# Patient Record
Sex: FEMALE | Race: WHITE
[De-identification: names, ages, dates, MRNs, and addresses within clinical notes are randomized per-mention and may not be internally consistent; named-entity substitution may affect disease eponyms.]

---

## 2017-01-18 ENCOUNTER — APPOINTMENT (OUTPATIENT)
Dept: ORTHOPEDIC SURGERY | Facility: CLINIC | Age: 67
End: 2017-01-18

## 2017-02-23 ENCOUNTER — APPOINTMENT (OUTPATIENT)
Dept: ORTHOPEDIC SURGERY | Facility: CLINIC | Age: 67
End: 2017-02-23

## 2017-02-23 DIAGNOSIS — S93.402A SPRAIN OF UNSPECIFIED LIGAMENT OF LEFT ANKLE, INITIAL ENCOUNTER: ICD-10-CM

## 2017-02-23 DIAGNOSIS — Z00.00 ENCOUNTER FOR GENERAL ADULT MEDICAL EXAMINATION W/OUT ABNORMAL FINDINGS: ICD-10-CM

## 2017-02-28 ENCOUNTER — FORM ENCOUNTER (OUTPATIENT)
Age: 67
End: 2017-02-28

## 2017-03-01 ENCOUNTER — OUTPATIENT (OUTPATIENT)
Dept: OUTPATIENT SERVICES | Facility: HOSPITAL | Age: 67
LOS: 1 days | End: 2017-03-01
Payer: MEDICARE

## 2017-03-01 ENCOUNTER — APPOINTMENT (OUTPATIENT)
Dept: MRI IMAGING | Facility: CLINIC | Age: 67
End: 2017-03-01

## 2017-03-01 DIAGNOSIS — Z00.8 ENCOUNTER FOR OTHER GENERAL EXAMINATION: ICD-10-CM

## 2017-03-01 PROCEDURE — 73721 MRI JNT OF LWR EXTRE W/O DYE: CPT

## 2017-03-22 ENCOUNTER — APPOINTMENT (OUTPATIENT)
Dept: ORTHOPEDIC SURGERY | Facility: CLINIC | Age: 67
End: 2017-03-22

## 2017-03-22 DIAGNOSIS — S93.492A SPRAIN OF OTHER LIGAMENT OF LEFT ANKLE, INITIAL ENCOUNTER: ICD-10-CM

## 2017-05-03 ENCOUNTER — APPOINTMENT (OUTPATIENT)
Dept: ORTHOPEDIC SURGERY | Facility: CLINIC | Age: 67
End: 2017-05-03

## 2017-05-03 DIAGNOSIS — M17.12 UNILATERAL PRIMARY OSTEOARTHRITIS, LEFT KNEE: ICD-10-CM

## 2022-02-10 ENCOUNTER — INPATIENT (INPATIENT)
Facility: HOSPITAL | Age: 72
LOS: 2 days | Discharge: HOME | DRG: 35 | End: 2022-02-13
Attending: HOSPITALIST
Payer: MEDICARE

## 2022-02-10 ENCOUNTER — TRANSCRIPTION ENCOUNTER (OUTPATIENT)
Age: 72
End: 2022-02-10

## 2022-02-10 VITALS
HEIGHT: 63 IN | TEMPERATURE: 98 F | WEIGHT: 151.9 LBS | RESPIRATION RATE: 20 BRPM | DIASTOLIC BLOOD PRESSURE: 75 MMHG | SYSTOLIC BLOOD PRESSURE: 161 MMHG | HEART RATE: 90 BPM | OXYGEN SATURATION: 99 %

## 2022-02-10 DIAGNOSIS — I65.29 OCCLUSION AND STENOSIS OF UNSPECIFIED CAROTID ARTERY: ICD-10-CM

## 2022-02-10 LAB
ALBUMIN SERPL ELPH-MCNC: 4.1 G/DL — SIGNIFICANT CHANGE UP (ref 3.3–5.2)
ALP SERPL-CCNC: 82 U/L — SIGNIFICANT CHANGE UP (ref 40–120)
ALT FLD-CCNC: 15 U/L — SIGNIFICANT CHANGE UP
ANION GAP SERPL CALC-SCNC: 15 MMOL/L — SIGNIFICANT CHANGE UP (ref 5–17)
ANION GAP SERPL CALC-SCNC: 16 MMOL/L — SIGNIFICANT CHANGE UP (ref 5–17)
APTT BLD: 29.1 SEC — SIGNIFICANT CHANGE UP (ref 27.5–35.5)
AST SERPL-CCNC: 17 U/L — SIGNIFICANT CHANGE UP
BASOPHILS # BLD AUTO: 0.03 K/UL — SIGNIFICANT CHANGE UP (ref 0–0.2)
BASOPHILS NFR BLD AUTO: 0.5 % — SIGNIFICANT CHANGE UP (ref 0–2)
BILIRUB SERPL-MCNC: 0.4 MG/DL — SIGNIFICANT CHANGE UP (ref 0.4–2)
BUN SERPL-MCNC: 21.8 MG/DL — HIGH (ref 8–20)
BUN SERPL-MCNC: 26.3 MG/DL — HIGH (ref 8–20)
CALCIUM SERPL-MCNC: 8.3 MG/DL — LOW (ref 8.6–10.2)
CALCIUM SERPL-MCNC: 9.6 MG/DL — SIGNIFICANT CHANGE UP (ref 8.6–10.2)
CHLORIDE SERPL-SCNC: 104 MMOL/L — SIGNIFICANT CHANGE UP (ref 98–107)
CHLORIDE SERPL-SCNC: 106 MMOL/L — SIGNIFICANT CHANGE UP (ref 98–107)
CO2 SERPL-SCNC: 20 MMOL/L — LOW (ref 22–29)
CO2 SERPL-SCNC: 22 MMOL/L — SIGNIFICANT CHANGE UP (ref 22–29)
CREAT SERPL-MCNC: 1.01 MG/DL — SIGNIFICANT CHANGE UP (ref 0.5–1.3)
CREAT SERPL-MCNC: 1.05 MG/DL — SIGNIFICANT CHANGE UP (ref 0.5–1.3)
EOSINOPHIL # BLD AUTO: 0.05 K/UL — SIGNIFICANT CHANGE UP (ref 0–0.5)
EOSINOPHIL NFR BLD AUTO: 0.8 % — SIGNIFICANT CHANGE UP (ref 0–6)
GLUCOSE SERPL-MCNC: 172 MG/DL — HIGH (ref 70–99)
GLUCOSE SERPL-MCNC: 243 MG/DL — HIGH (ref 70–99)
HCT VFR BLD CALC: 33.2 % — LOW (ref 34.5–45)
HCT VFR BLD CALC: 38.4 % — SIGNIFICANT CHANGE UP (ref 34.5–45)
HGB BLD-MCNC: 11 G/DL — LOW (ref 11.5–15.5)
HGB BLD-MCNC: 12.5 G/DL — SIGNIFICANT CHANGE UP (ref 11.5–15.5)
IMM GRANULOCYTES NFR BLD AUTO: 0.2 % — SIGNIFICANT CHANGE UP (ref 0–1.5)
INR BLD: 1.01 RATIO — SIGNIFICANT CHANGE UP (ref 0.88–1.16)
LACTATE SERPL-SCNC: 1 MMOL/L — SIGNIFICANT CHANGE UP (ref 0.5–2)
LYMPHOCYTES # BLD AUTO: 1.39 K/UL — SIGNIFICANT CHANGE UP (ref 1–3.3)
LYMPHOCYTES # BLD AUTO: 22.9 % — SIGNIFICANT CHANGE UP (ref 13–44)
MCHC RBC-ENTMCNC: 30 PG — SIGNIFICANT CHANGE UP (ref 27–34)
MCHC RBC-ENTMCNC: 30.9 PG — SIGNIFICANT CHANGE UP (ref 27–34)
MCHC RBC-ENTMCNC: 32.6 GM/DL — SIGNIFICANT CHANGE UP (ref 32–36)
MCHC RBC-ENTMCNC: 33.1 GM/DL — SIGNIFICANT CHANGE UP (ref 32–36)
MCV RBC AUTO: 92.3 FL — SIGNIFICANT CHANGE UP (ref 80–100)
MCV RBC AUTO: 93.3 FL — SIGNIFICANT CHANGE UP (ref 80–100)
MONOCYTES # BLD AUTO: 0.36 K/UL — SIGNIFICANT CHANGE UP (ref 0–0.9)
MONOCYTES NFR BLD AUTO: 5.9 % — SIGNIFICANT CHANGE UP (ref 2–14)
NEUTROPHILS # BLD AUTO: 4.22 K/UL — SIGNIFICANT CHANGE UP (ref 1.8–7.4)
NEUTROPHILS NFR BLD AUTO: 69.7 % — SIGNIFICANT CHANGE UP (ref 43–77)
PLATELET # BLD AUTO: 233 K/UL — SIGNIFICANT CHANGE UP (ref 150–400)
PLATELET # BLD AUTO: 292 K/UL — SIGNIFICANT CHANGE UP (ref 150–400)
POTASSIUM SERPL-MCNC: 3.9 MMOL/L — SIGNIFICANT CHANGE UP (ref 3.5–5.3)
POTASSIUM SERPL-MCNC: 4.4 MMOL/L — SIGNIFICANT CHANGE UP (ref 3.5–5.3)
POTASSIUM SERPL-SCNC: 3.9 MMOL/L — SIGNIFICANT CHANGE UP (ref 3.5–5.3)
POTASSIUM SERPL-SCNC: 4.4 MMOL/L — SIGNIFICANT CHANGE UP (ref 3.5–5.3)
PROT SERPL-MCNC: 6.7 G/DL — SIGNIFICANT CHANGE UP (ref 6.6–8.7)
PROTHROM AB SERPL-ACNC: 11.7 SEC — SIGNIFICANT CHANGE UP (ref 10.6–13.6)
RBC # BLD: 3.56 M/UL — LOW (ref 3.8–5.2)
RBC # BLD: 4.16 M/UL — SIGNIFICANT CHANGE UP (ref 3.8–5.2)
RBC # FLD: 12.7 % — SIGNIFICANT CHANGE UP (ref 10.3–14.5)
RBC # FLD: 12.9 % — SIGNIFICANT CHANGE UP (ref 10.3–14.5)
SODIUM SERPL-SCNC: 141 MMOL/L — SIGNIFICANT CHANGE UP (ref 135–145)
SODIUM SERPL-SCNC: 142 MMOL/L — SIGNIFICANT CHANGE UP (ref 135–145)
WBC # BLD: 11.17 K/UL — HIGH (ref 3.8–10.5)
WBC # BLD: 6.06 K/UL — SIGNIFICANT CHANGE UP (ref 3.8–10.5)
WBC # FLD AUTO: 11.17 K/UL — HIGH (ref 3.8–10.5)
WBC # FLD AUTO: 6.06 K/UL — SIGNIFICANT CHANGE UP (ref 3.8–10.5)

## 2022-02-10 RX ORDER — DEXTROSE 50 % IN WATER 50 %
12.5 SYRINGE (ML) INTRAVENOUS ONCE
Refills: 0 | Status: DISCONTINUED | OUTPATIENT
Start: 2022-02-10 | End: 2022-02-13

## 2022-02-10 RX ORDER — CLOPIDOGREL BISULFATE 75 MG/1
75 TABLET, FILM COATED ORAL DAILY
Refills: 0 | Status: DISCONTINUED | OUTPATIENT
Start: 2022-02-10 | End: 2022-02-13

## 2022-02-10 RX ORDER — ATROPINE SULFATE 0.1 MG/ML
0.5 SYRINGE (ML) INJECTION ONCE
Refills: 0 | Status: COMPLETED | OUTPATIENT
Start: 2022-02-10 | End: 2022-02-10

## 2022-02-10 RX ORDER — CLOPIDOGREL BISULFATE 75 MG/1
75 TABLET, FILM COATED ORAL ONCE
Refills: 0 | Status: COMPLETED | OUTPATIENT
Start: 2022-02-10 | End: 2022-02-10

## 2022-02-10 RX ORDER — SODIUM CHLORIDE 9 MG/ML
1000 INJECTION INTRAMUSCULAR; INTRAVENOUS; SUBCUTANEOUS
Refills: 0 | Status: DISCONTINUED | OUTPATIENT
Start: 2022-02-10 | End: 2022-02-12

## 2022-02-10 RX ORDER — ASPIRIN/CALCIUM CARB/MAGNESIUM 324 MG
81 TABLET ORAL ONCE
Refills: 0 | Status: COMPLETED | OUTPATIENT
Start: 2022-02-10 | End: 2022-02-10

## 2022-02-10 RX ORDER — PHENYLEPHRINE HYDROCHLORIDE 10 MG/ML
0.1 INJECTION INTRAVENOUS
Qty: 40 | Refills: 0 | Status: DISCONTINUED | OUTPATIENT
Start: 2022-02-10 | End: 2022-02-10

## 2022-02-10 RX ORDER — ONDANSETRON 8 MG/1
4 TABLET, FILM COATED ORAL ONCE
Refills: 0 | Status: COMPLETED | OUTPATIENT
Start: 2022-02-10 | End: 2022-02-10

## 2022-02-10 RX ORDER — ATORVASTATIN CALCIUM 80 MG/1
20 TABLET, FILM COATED ORAL AT BEDTIME
Refills: 0 | Status: DISCONTINUED | OUTPATIENT
Start: 2022-02-10 | End: 2022-02-13

## 2022-02-10 RX ORDER — INSULIN LISPRO 100/ML
VIAL (ML) SUBCUTANEOUS
Refills: 0 | Status: DISCONTINUED | OUTPATIENT
Start: 2022-02-10 | End: 2022-02-13

## 2022-02-10 RX ORDER — MULTIVIT-MIN/FERROUS GLUCONATE 9 MG/15 ML
1 LIQUID (ML) ORAL DAILY
Refills: 0 | Status: DISCONTINUED | OUTPATIENT
Start: 2022-02-10 | End: 2022-02-13

## 2022-02-10 RX ORDER — DOPAMINE HYDROCHLORIDE 40 MG/ML
10 INJECTION, SOLUTION, CONCENTRATE INTRAVENOUS
Qty: 400 | Refills: 0 | Status: DISCONTINUED | OUTPATIENT
Start: 2022-02-10 | End: 2022-02-11

## 2022-02-10 RX ORDER — SODIUM CHLORIDE 9 MG/ML
1000 INJECTION, SOLUTION INTRAVENOUS
Refills: 0 | Status: DISCONTINUED | OUTPATIENT
Start: 2022-02-10 | End: 2022-02-12

## 2022-02-10 RX ORDER — ASPIRIN/CALCIUM CARB/MAGNESIUM 324 MG
81 TABLET ORAL DAILY
Refills: 0 | Status: DISCONTINUED | OUTPATIENT
Start: 2022-02-11 | End: 2022-02-13

## 2022-02-10 RX ORDER — PHENYLEPHRINE HYDROCHLORIDE 10 MG/ML
200 INJECTION INTRAVENOUS ONCE
Refills: 0 | Status: COMPLETED | OUTPATIENT
Start: 2022-02-10 | End: 2022-02-10

## 2022-02-10 RX ORDER — CHLORHEXIDINE GLUCONATE 213 G/1000ML
1 SOLUTION TOPICAL ONCE
Refills: 0 | Status: COMPLETED | OUTPATIENT
Start: 2022-02-10 | End: 2022-02-13

## 2022-02-10 RX ORDER — IRBESARTAN 75 MG/1
1 TABLET ORAL
Qty: 0 | Refills: 0 | DISCHARGE

## 2022-02-10 RX ORDER — DEXTROSE 50 % IN WATER 50 %
25 SYRINGE (ML) INTRAVENOUS ONCE
Refills: 0 | Status: DISCONTINUED | OUTPATIENT
Start: 2022-02-10 | End: 2022-02-13

## 2022-02-10 RX ORDER — LOSARTAN POTASSIUM 100 MG/1
100 TABLET, FILM COATED ORAL DAILY
Refills: 0 | Status: DISCONTINUED | OUTPATIENT
Start: 2022-02-10 | End: 2022-02-10

## 2022-02-10 RX ORDER — DEXTROSE 50 % IN WATER 50 %
15 SYRINGE (ML) INTRAVENOUS ONCE
Refills: 0 | Status: DISCONTINUED | OUTPATIENT
Start: 2022-02-10 | End: 2022-02-12

## 2022-02-10 RX ORDER — INSULIN LISPRO 100/ML
VIAL (ML) SUBCUTANEOUS AT BEDTIME
Refills: 0 | Status: DISCONTINUED | OUTPATIENT
Start: 2022-02-10 | End: 2022-02-13

## 2022-02-10 RX ORDER — MIDODRINE HYDROCHLORIDE 2.5 MG/1
10 TABLET ORAL EVERY 8 HOURS
Refills: 0 | Status: DISCONTINUED | OUTPATIENT
Start: 2022-02-10 | End: 2022-02-11

## 2022-02-10 RX ORDER — GLUCAGON INJECTION, SOLUTION 0.5 MG/.1ML
1 INJECTION, SOLUTION SUBCUTANEOUS ONCE
Refills: 0 | Status: DISCONTINUED | OUTPATIENT
Start: 2022-02-10 | End: 2022-02-13

## 2022-02-10 RX ADMIN — Medication 81 MILLIGRAM(S): at 09:46

## 2022-02-10 RX ADMIN — CLOPIDOGREL BISULFATE 75 MILLIGRAM(S): 75 TABLET, FILM COATED ORAL at 09:46

## 2022-02-10 RX ADMIN — PHENYLEPHRINE HYDROCHLORIDE 200 MICROGRAM(S): 10 INJECTION INTRAVENOUS at 11:50

## 2022-02-10 RX ADMIN — DOPAMINE HYDROCHLORIDE 25.8 MICROGRAM(S)/KG/MIN: 40 INJECTION, SOLUTION, CONCENTRATE INTRAVENOUS at 11:40

## 2022-02-10 RX ADMIN — ATORVASTATIN CALCIUM 20 MILLIGRAM(S): 80 TABLET, FILM COATED ORAL at 21:38

## 2022-02-10 RX ADMIN — ONDANSETRON 4 MILLIGRAM(S): 8 TABLET, FILM COATED ORAL at 13:14

## 2022-02-10 RX ADMIN — ONDANSETRON 4 MILLIGRAM(S): 8 TABLET, FILM COATED ORAL at 11:40

## 2022-02-10 RX ADMIN — SODIUM CHLORIDE 100 MILLILITER(S): 9 INJECTION INTRAMUSCULAR; INTRAVENOUS; SUBCUTANEOUS at 11:30

## 2022-02-10 RX ADMIN — Medication 0.5 MILLIGRAM(S): at 14:45

## 2022-02-10 NOTE — ASU PATIENT PROFILE, ADULT - FALL HARM RISK - HARM RISK INTERVENTIONS

## 2022-02-10 NOTE — DISCHARGE NOTE PROVIDER - NSDCCPCAREPLAN_GEN_ALL_CORE_FT
PRINCIPAL DISCHARGE DIAGNOSIS  Diagnosis: Carotid artery disease  Assessment and Plan of Treatment: Left Carotid Stent via LFA       PRINCIPAL DISCHARGE DIAGNOSIS  Diagnosis: Carotid artery disease  Assessment and Plan of Treatment: Left Carotid Stent via LFA  see Dr Gant      SECONDARY DISCHARGE DIAGNOSES  Diagnosis: Postprocedural hypotension  Assessment and Plan of Treatment: improved   blood pressure medication stopped    Diagnosis: Hypertension  Assessment and Plan of Treatment: hold avapro due to risk of low blood pressure   monitor your blood pressure closely   see your cardiologits to start medication as needed

## 2022-02-10 NOTE — CONSULT NOTE ADULT - SUBJECTIVE AND OBJECTIVE BOX
Patient is a 71y old  Female who presents with a chief complaint of Left Carotid Angioplasty (10 Feb 2022 11:59)    BRIEF HOSPITAL COURSE:   71y F  of bilateral carotid artery disease ( left severe, high grade.  Right-40-50%, Type 2 DM, HTN, HLD, Bladder CA -presence of urostomy, presenting to cath holding area this am   for a Carotid Angioplasty with Dr GUILLERMINA Gant.    Events last 24 hours: ***    PAST MEDICAL & SURGICAL HISTORY:  DM (diabetes mellitus)    Bladder cancer  Has ilial conduit-urostomy- drainage bag    Hyperlipidemia    Hypertension        Review of Systems:  CONSTITUTIONAL: No fever, chills, or fatigue  EYES: No eye pain, visual disturbances, or discharge  ENMT:  No difficulty hearing, tinnitus, vertigo; No sinus or throat pain  NECK: No pain or stiffness  RESPIRATORY: No cough, wheezing, chills or hemoptysis; No shortness of breath  CARDIOVASCULAR: No chest pain, palpitations, dizziness, or leg swelling  GASTROINTESTINAL: No abdominal or epigastric pain. No nausea, vomiting, or hematemesis; No diarrhea or constipation. No melena or hematochezia.  GENITOURINARY: No dysuria, frequency, hematuria, or incontinence  NEUROLOGICAL: No headaches, memory loss, loss of strength, numbness, or tremors  SKIN: No itching, burning, rashes, or lesions   MUSCULOSKELETAL: No joint pain or swelling; No muscle, back, or extremity pain  PSYCHIATRIC: No depression, anxiety, mood swings, or difficulty sleeping      Medications:    DOPamine Infusion 10 MICROgram(s)/kG/Min IV Continuous <Continuous>  losartan 100 milliGRAM(s) Oral daily          clopidogrel Tablet 75 milliGRAM(s) Oral daily        atorvastatin 20 milliGRAM(s) Oral at bedtime  dextrose 40% Gel 15 Gram(s) Oral once  dextrose 50% Injectable 25 Gram(s) IV Push once  dextrose 50% Injectable 12.5 Gram(s) IV Push once  dextrose 50% Injectable 25 Gram(s) IV Push once  glucagon  Injectable 1 milliGRAM(s) IntraMuscular once  insulin lispro (ADMELOG) corrective regimen sliding scale   SubCutaneous three times a day before meals  insulin lispro (ADMELOG) corrective regimen sliding scale   SubCutaneous at bedtime    dextrose 5%. 1000 milliLiter(s) IV Continuous <Continuous>  dextrose 5%. 1000 milliLiter(s) IV Continuous <Continuous>  multivitamin/minerals 1 Tablet(s) Oral daily  sodium chloride 0.9%. 1000 milliLiter(s) IV Continuous <Continuous>      chlorhexidine 4% Liquid 1 Application(s) Topical once            ICU Vital Signs Last 24 Hrs  T(C): 36.4 (10 Feb 2022 08:02), Max: 36.4 (10 Feb 2022 08:02)  T(F): 97.5 (10 Feb 2022 08:02), Max: 97.5 (10 Feb 2022 08:02)  HR: 66 (10 Feb 2022 22:00) (58 - 111)  BP: 122/59 (10 Feb 2022 22:00) (109/55 - 161/73)  BP(mean): 80 (10 Feb 2022 21:45) (80 - 102)  ABP: --  ABP(mean): --  RR: 29 (10 Feb 2022 22:00) (14 - 29)  SpO2: 99% (10 Feb 2022 22:00) (95% - 100%)          I&O's Detail    10 Feb 2022 07:01  -  10 Feb 2022 22:08  --------------------------------------------------------  IN:    DOPamine Infusion: 133.6 mL    Oral Fluid: 120 mL    sodium chloride 0.9%: 400 mL  Total IN: 653.6 mL    OUT:    Urostomy (mL): 600 mL    Voided (mL): 600 mL  Total OUT: 1200 mL    Total NET: -546.4 mL            LABS:                        11.0   11.17 )-----------( 233      ( 10 Feb 2022 20:34 )             33.2     02-10    141  |  106  |  21.8<H>  ----------------------------<  243<H>  4.4   |  20.0<L>  |  1.05    Ca    8.3<L>      10 Feb 2022 20:34    TPro  6.7  /  Alb  4.1  /  TBili  0.4  /  DBili  x   /  AST  17  /  ALT  15  /  AlkPhos  82  02-10          CAPILLARY BLOOD GLUCOSE      POCT Blood Glucose.: 249 mg/dL (10 Feb 2022 21:35)    PT/INR - ( 10 Feb 2022 08:46 )   PT: 11.7 sec;   INR: 1.01 ratio         PTT - ( 10 Feb 2022 08:46 )  PTT:29.1 sec    CULTURES:      Physical Examination:    General: No acute distress.  Alert, oriented, interactive, nonfocal    HEENT: Pupils equal, reactive to light.  Symmetric.    PULM: Clear to auscultation bilaterally, no significant sputum production    CVS: Regular rate and rhythm, no murmurs, rubs, or gallops    ABD: Soft, nondistended, nontender, normoactive bowel sounds, no masses    EXT: No edema, nontender    SKIN: Warm and well perfused, no rashes noted.    NEURO: A&Ox3, strength 5/5 all extremities, cranial nerves grossly intact, no focal deficits      RADIOLOGY: ***      CRITICAL CARE TIME SPENT: ***  Evaluating/treating patient, reviewing data/labs/imaging, discussing case with multidisciplinary team, discussing plan/goals of care with patient/family. Non-inclusive of procedure time.   Patient is a 71y old  Female who presents with a chief complaint of Left Carotid Angioplasty (10 Feb 2022 11:59)    BRIEF HOSPITAL COURSE:   71y F PMHx bilateral carotid artery disease ( left severe, high grade. Right-40-50%, Type 2 DM, HTN, HLD, Bladder CA -presence of urostomy admitted for a elective Carotid Angioplasty with Dr GUILLERMINA Gant. Pt underwent carotid stent x1, was on Charles and Dopamine infusions during the case. Pt received a dose of Atropine x1 dary-procedure for bradycardia. Was weaned off of Charles post-procedure, however remains on Dopamine 13mcg/kg/m and unable to be weaned off 2/2 hypotension. ICU consulted for further management. On bedside assessment, pt AAOx4, no acute complaints. Denies CP, SOB/difficulty breathing, abdominal pain. States her baseline SBP ~130. Currently  on 13mcg/kg of Dopamine.   Repeat labs WNL.     PAST MEDICAL & SURGICAL HISTORY:  DM (diabetes mellitus)  Bladder cancer  Has ilial conduit-urostomy- drainage bag  Hyperlipidemia  Hypertension    Review of Systems:  CONSTITUTIONAL: No fever, chills, or fatigue  EYES: No eye pain, visual disturbances, or discharge  ENMT:  No difficulty hearing, tinnitus, vertigo; No sinus or throat pain  NECK: No pain or stiffness  RESPIRATORY: No cough, wheezing, chills or hemoptysis; No shortness of breath  CARDIOVASCULAR: No chest pain, palpitations, dizziness, or leg swelling  GASTROINTESTINAL: No abdominal or epigastric pain. No nausea, vomiting, or hematemesis; No diarrhea or constipation. No melena or hematochezia.  GENITOURINARY: No dysuria, frequency, hematuria, or incontinence  NEUROLOGICAL: No headaches, memory loss, loss of strength, numbness, or tremors  SKIN: No itching, burning, rashes, or lesions   MUSCULOSKELETAL: No joint pain or swelling; No muscle, back, or extremity pain  PSYCHIATRIC: No depression, anxiety, mood swings, or difficulty sleeping    Medications:  DOPamine Infusion 10 MICROgram(s)/kG/Min IV Continuous <Continuous>  losartan 100 milliGRAM(s) Oral daily  clopidogrel Tablet 75 milliGRAM(s) Oral daily  atorvastatin 20 milliGRAM(s) Oral at bedtime  dextrose 40% Gel 15 Gram(s) Oral once  dextrose 50% Injectable 25 Gram(s) IV Push once  dextrose 50% Injectable 12.5 Gram(s) IV Push once  dextrose 50% Injectable 25 Gram(s) IV Push once  glucagon  Injectable 1 milliGRAM(s) IntraMuscular once  insulin lispro (ADMELOG) corrective regimen sliding scale   SubCutaneous three times a day before meals  insulin lispro (ADMELOG) corrective regimen sliding scale   SubCutaneous at bedtime  dextrose 5%. 1000 milliLiter(s) IV Continuous <Continuous>  dextrose 5%. 1000 milliLiter(s) IV Continuous <Continuous>  multivitamin/minerals 1 Tablet(s) Oral daily  sodium chloride 0.9%. 1000 milliLiter(s) IV Continuous <Continuous>  chlorhexidine 4% Liquid 1 Application(s) Topical once    ICU Vital Signs Last 24 Hrs  T(C): 36.4 (10 Feb 2022 08:02), Max: 36.4 (10 Feb 2022 08:02)  T(F): 97.5 (10 Feb 2022 08:02), Max: 97.5 (10 Feb 2022 08:02)  HR: 66 (10 Feb 2022 22:00) (58 - 111)  BP: 122/59 (10 Feb 2022 22:00) (109/55 - 161/73)  BP(mean): 80 (10 Feb 2022 21:45) (80 - 102)  ABP: --  ABP(mean): --  RR: 29 (10 Feb 2022 22:00) (14 - 29)  SpO2: 99% (10 Feb 2022 22:00) (95% - 100%)    I&O's Detail  10 Feb 2022 07:01  -  10 Feb 2022 22:08  --------------------------------------------------------  IN:    DOPamine Infusion: 133.6 mL    Oral Fluid: 120 mL    sodium chloride 0.9%: 400 mL  Total IN: 653.6 mL  OUT:    Urostomy (mL): 600 mL    Voided (mL): 600 mL  Total OUT: 1200 mL  Total NET: -546.4 mL    LABS:                        11.0   11.17 )-----------( 233      ( 10 Feb 2022 20:34 )             33.2     02-10  141  |  106  |  21.8<H>  ----------------------------<  243<H>  4.4   |  20.0<L>  |  1.05  Ca    8.3<L>      10 Feb 2022 20:34  TPro  6.7  /  Alb  4.1  /  TBili  0.4  /  DBili  x   /  AST  17  /  ALT  15  /  AlkPhos  82  02-10    CAPILLARY BLOOD GLUCOSE  POCT Blood Glucose.: 249 mg/dL (10 Feb 2022 21:35)    PT/INR - ( 10 Feb 2022 08:46 )   PT: 11.7 sec;   INR: 1.01 ratio    PTT - ( 10 Feb 2022 08:46 )  PTT:29.1 sec    CULTURES:    Physical Examination:  General: Alert, oriented, interactive, nonfocal  HEENT: PERRL.  NECK: Supple.  PULM: Clear to auscultation bilaterally.  CVS: s1/s2.  ABD: Soft, nondistended, nontender, normoactive bowel sounds, no masses  EXT: No edema, nontender  SKIN: Warm.      RADIOLOGY:     71y F PMHx bilateral carotid artery disease ( left severe, high grade. Right-40-50%, Type 2 DM, HTN, HLD, Bladder CA -presence of urostomy admitted for a elective Carotid Angioplasty with Dr GUILLERMINA Gant. Pt underwent carotid stent x1, was on Charles and Dopamine infusions during the case. Pt received a dose of Atropine x1 dary-procedure for bradycardia. Was weaned off of Charles post-procedure, however remains on Dopamine 13mcg/kg/m and unable to be weaned off 2/2 hypotension. ICU consulted for further management.  Assessment:  1. Shock  2. POD#0 s/p carotid endarterectomy x1 stent    Plan:  NEURO:  -No acute issues.    CV:  -Remains on Dopamine infusion (13mcg/kg/m), actively titrating for goal SBP ~120, monitoring end points of organ perfusion. Shock state likely 2/2 Carotid baroreceptor stimulation after CEA.   -Keep K~4 and Mg>2 for optimal arrhythmia suppression.  -ASA/Plavix/Statin.     RESP:  -No acute issues. Goal SpO2 >92%.     RENAL:  -Renal function currently WNL.  -trend lytes/Scr daily with BMP  -I's and O's, goal UOP 0.5 cc/kg/hr  -renal dose meds and avoid nephrotoxins     GI:  -DASH/TLC diet.     ENDO:  -Aggressive glycemic control to limit FS glucose to <180mg/dl. ISS.    ID:  -Afebrile, no leukocytosis.    HEME:  -DVT ppx w/ SCDs.     DISPO: D/w ICU intensivist Dr. Vicente. Will admit to MICU.    CRITICAL CARE TIME SPENT:  40 minutes of critical care time spent providing medical care for patient's acute illness/conditions that impairs at least one vital organ system and/or poses a high risk of imminent or life threatening deterioration in the patient's condition. It includes time spent evaluating and treating the patient's acute illness as well as time spent reviewing labs, radiology, discussing goals of care with patient and/or patient's family, and discussing the case with a multidisciplinary team, including the eICU, in an effort to prevent further life threatening deterioration or end organ damage. This time is independent of any procedures performed.

## 2022-02-10 NOTE — CHART NOTE - NSCHARTNOTEFT_GEN_A_CORE
Cardiology NP note:     -MICU consult called secondary to need for critical care observation with continued vasopressor requirements to maintain SBP > 120 mmhg post carotid stent placement  -Dr. Gant notified

## 2022-02-10 NOTE — CONSULT NOTE ADULT - ATTENDING COMMENTS
71F w/ PMHx DM, HTN, bladder CA s/p ileal conduit, B/L carotid artery disease ( left severe, high grade. Right-40-50%) admitted for an elective L carotid angioplasty. Post procedure was c/b bradycardia and hypotension. Started on Dopamine and phenylephrine and ICU consulted. Pt was already weaned off phenylephrine prior to ICU evaluation.  Asymptomatic and SBP~120s on 13mcg/kg/min Dopamine. Groin site clean. Slight drop in H/H 12.5->11  Will observe in ICU overnight. Trend H/H 71F w/ PMHx DM, HTN, bladder CA s/p ileal conduit, B/L carotid artery disease ( left severe, high grade. Right-40-50%) admitted for an elective L carotid angioplasty. Post procedure was c/b bradycardia and hypotension. Started on Dopamine and phenylephrine and ICU consulted. Pt was already weaned off phenylephrine prior to ICU evaluation.  Asymptomatic and SBP~120s on 13mcg/kg/min Dopamine. Groin site clean. Slight drop in H/H 12.5->11  Will observe in ICU overnight. Trend H/H. Stopped losartan and started on Midodrine 10mg q8

## 2022-02-10 NOTE — CHART NOTE - NSCHARTNOTEFT_GEN_A_CORE
Pt with two episodes of vomiting and persistent nausea post procedure-Zofran 4 mg IVP given x2 with progressive relief. Small hematoma Left groin post wretching-pressure maintained and held-soft @ present with no further formation. Resting comfortably @ present-SBP >120 maintained on Dopamine drip. Will continue to monitor. Neuro sighs stable.

## 2022-02-10 NOTE — DISCHARGE NOTE PROVIDER - NSDCMRMEDTOKEN_GEN_ALL_CORE_FT
aspirin 81 mg oral tablet, chewable: 1 tab(s) orally once a day  Cranberry oral capsule: orally once a day  irbesartan 300 mg oral tablet: 1 tab(s) orally once a day  magnesium carbonate 250 mg oral capsule: 2  orally once a day  Multi-Day Plus Minerals oral tablet: 1 tab(s) orally once a day  Plavix 75 mg oral tablet:   rosuvastatin 5 mg oral tablet: 1 tab(s) orally once a day  Senna 8.6 mg oral tablet: 1 tab(s) orally once a day (at bedtime)  Starlix 60 mg oral tablet: 1 tab(s) orally 3 times a day (before meals)  Tradjenta 5 mg oral tablet: 1 tab(s) orally once a day   aspirin 81 mg oral tablet, chewable: 1 tab(s) orally once a day  Cranberry oral capsule: orally once a day  magnesium carbonate 250 mg oral capsule: 2  orally once a day  Multi-Day Plus Minerals oral tablet: 1 tab(s) orally once a day  Plavix 75 mg oral tablet: 1  orally once a day  rosuvastatin 5 mg oral tablet: 1 tab(s) orally once a day  Senna 8.6 mg oral tablet: 1 tab(s) orally once a day (at bedtime)  Starlix 60 mg oral tablet: 1 tab(s) orally 3 times a day (before meals)  Tradjenta 5 mg oral tablet: 1 tab(s) orally once a day

## 2022-02-10 NOTE — H&P PST ADULT - ASSESSMENT
A 71 year old female with a PMH of bilateral carotid artery disease ( left severe, high grade, Right-40-50%, Type 2 DM, HTN, HLD, Bladder CA -presence of urostomy, presenting to cath holding area this am   for a Carotid Angioplasty with Dr GUILLERMINA Gant.    PRE-PROCEDURE ASSESSMENT  -NPO after midnight confirmed  -IV insert  -Patient seen and examined  -Labs reviewed  -Pre-procedure teaching completed with patient   -Consent obtained by Interventional Cardiologist  -Questions answered       A 71 year old female with a PMH of bilateral carotid artery disease ( left severe, high grade, Right-40-50%, Type 2 DM, HTN, HLD, Bladder CA -presence of urostomy, presenting to cath holding area this am   for a Carotid Angioplasty with Dr GUILLERMINA Gant.    PRE-PROCEDURE ASSESSMENT  -NPO after midnight confirmed  -Baby ASA and Plavix 75 mg po given in holding area this am  -IV insert  -Patient seen and examined  -Labs reviewed  -Pre-procedure teaching completed with patient   -Consent obtained by Interventional Cardiologist  -Questions answered

## 2022-02-10 NOTE — H&P PST ADULT - HISTORY OF PRESENT ILLNESS
Narrative: This is a 71 year old female with a PMH of bilateral carotid artery disease ( left severe, high grade, Right-40-50%, Type 2 DM, HTN, HLD, Bladder CA -presence of urostomy, presenting to cath holding area this am   for a Carotid Angioplasty with Dr GUILLERMINA Gant.    MAL  ASA  GFR  Bleeding Risk Assessment  Symptoms:        Angina (Class):        Ischemic Symptoms:     Heart Failure:        Systolic/Diastolic/Combined:        NYHA Class (within 2 weeks):     Assessment of LVEF:       EF:        Assessed by:        Date:     Prior Cardiac Interventions:       PCI's:        CABG:     Noninvasive Testing:   Stress Test: Date:        Protocol:        Duration of Exercise:        Symptoms:        EKG Changes:        DTS:        Myocardial Imaging:        Risk Assessment:     Echo:     Antianginal Therapies:        Beta Blockers:         Calcium Channel Blockers:        Long Acting Nitrates:        Ranexa:     Associated Risk Factors:        Cerebrovascular Disease: N/A       Chronic Lung Disease: N/A       Peripheral Arterial Disease: N/A       Chronic Kidney Disease (if yes, what is GFR): N/A       Uncontrolled Diabetes (if yes, what is HgbA1C or FBS): N/A       Poorly Controlled Hypertension (if yes, what is SBP): N/A       Morbid Obesity (if yes, what is BMI): N/A       History of Recent Ventricular Arrhythmia: N/A       Inability to Ambulate Safely: N/A       Need for Therapeutic Anticoagulation: N/A       Antiplatelet or Contrast Allergy: N/A Narrative: This is a 71 year old female with a PMH of bilateral carotid artery disease ( left severe, high grade, Right-40-50%, Type 2 DM, HTN, HLD, Bladder CA -presence of urostomy, presenting to cath holding area this am   for a Carotid Angioplasty with Dr GUILLERMINA Gant. Pt denies any c/o CP, SOB, palpitations, dizziness, lightheadedness or neurologic deficts, and is in no acute distress.    MAL-2  ASA-3  GFR  Creat-  Bleeding Risk Assessment-  COVID Negative   Vaccinated with Timothy J 3/2021-no booster  Symptoms: No       Angina (Class):        Ischemic Symptoms:     Heart Failure: No       Systolic/Diastolic/Combined:        NYHA Class (within 2 weeks):   10/29/21-CT Angio of the Neck-Calcified Plaque 40-50% stenosis of Right internal Carotid, Severe high grade stenosis of the internal and external left carotid arteries.    Prior Cardiac Interventions: No       PCI's:        CABG:     Noninvasive Testing:   Stress Test: Date:        Protocol:        Duration of Exercise:        Symptoms:        EKG Changes:        DTS:        Myocardial Imaging:        Risk Assessment:     Echo: Normal LV systolic function      Associated Risk Factors:        Cerebrovascular Disease: N/A       Chronic Lung Disease: N/A       Peripheral Arterial Disease: N/A       Chronic Kidney Disease (if yes, what is GFR): N/A       Uncontrolled Diabetes (if yes, what is HgbA1C or FBS): N/A       Poorly Controlled Hypertension (if yes, what is SBP): N/A       Morbid Obesity (if yes, what is BMI): N/A       History of Recent Ventricular Arrhythmia: N/A       Inability to Ambulate Safely: N/A       Need for Therapeutic Anticoagulation: N/A       Antiplatelet or Contrast Allergy: N/A Narrative: This is a 71 year old female with a PMH of bilateral carotid artery disease ( left severe, high grade, Right-40-50%, Type 2 DM, HTN, HLD, Bladder CA -presence of urostomy, presenting to cath holding area this am   for a Carotid Angioplasty with Dr GUILLERMINA Gant. Pt had vascular consultation with Dr Jairo Salinas-refusing CEA surgeryPt denies any c/o CP, SOB, palpitations, dizziness, lightheadedness or neurologic deficts, and is in no acute distress.    MAL-2  ASA-3  GFR-57  Creat-1.01  Bleeding Risk Assessment-2.8%  COVID Negative   Vaccinated with Timothy J 3/2021-no booster    Symptoms: No       Angina (Class):        Ischemic Symptoms:     Heart Failure: No       Systolic/Diastolic/Combined:        NYHA Class (within 2 weeks):   10/29/21-CT Angio of the Neck-Calcified Plaque 40-50% stenosis of Right internal Carotid, Severe high grade stenosis of the internal and external left carotid arteries.    Prior Cardiac Interventions: No       PCI's:        CABG:     Noninvasive Testing:   Stress Test: Date:        Protocol:        Duration of Exercise:        Symptoms:        EKG Changes:        DTS:        Myocardial Imaging:        Risk Assessment:     Echo: Normal LV systolic function      Associated Risk Factors:        Cerebrovascular Disease: N/A       Chronic Lung Disease: N/A       Peripheral Arterial Disease: N/A       Chronic Kidney Disease (if yes, what is GFR): N/A       Uncontrolled Diabetes (if yes, what is HgbA1C or FBS): N/A       Poorly Controlled Hypertension (if yes, what is SBP): N/A       Morbid Obesity (if yes, what is BMI): N/A       History of Recent Ventricular Arrhythmia: N/A       Inability to Ambulate Safely: N/A       Need for Therapeutic Anticoagulation: N/A       Antiplatelet or Contrast Allergy: N/A Narrative: This is a 71 year old female with a PMH of bilateral carotid artery disease ( left severe, high grade, Right-40-50%, Type 2 DM, HTN, HLD, Bladder CA -presence of urostomy, presenting to cath holding area this am   for a Carotid Angioplasty with Dr GUILLERMINA Gant. Pt had vascular consultation with Dr Jairo Salinas-refusing CEA surgery-Pt denies any c/o CP, SOB, palpitations, dizziness, lightheadedness or neurologic deficts, and is in no acute distress.    MAL-2  ASA-3  GFR-57  Creat-1.01  Bleeding Risk Assessment-2.8%  COVID Negative 2/07/22  Vaccinated with Jand J 3/2021-no booster    Symptoms: No       Angina (Class):        Ischemic Symptoms:     Heart Failure: No       Systolic/Diastolic/Combined:        NYHA Class (within 2 weeks):   10/29/21-CT Angio of the Neck-Calcified Plaque 40-50% stenosis of Right internal Carotid, Severe high grade stenosis of the internal and external left carotid arteries.    Prior Cardiac Interventions: No       PCI's:        CABG:     Noninvasive Testing:   Stress Test: Date:        Protocol:        Duration of Exercise:        Symptoms:        EKG Changes:        DTS:        Myocardial Imaging:        Risk Assessment:     Echo: Normal LV systolic function      Associated Risk Factors:        Cerebrovascular Disease: N/A       Chronic Lung Disease: N/A       Peripheral Arterial Disease: N/A       Chronic Kidney Disease (if yes, what is GFR): N/A       Uncontrolled Diabetes (if yes, what is HgbA1C or FBS): N/A       Poorly Controlled Hypertension (if yes, what is SBP): N/A       Morbid Obesity (if yes, what is BMI): N/A       History of Recent Ventricular Arrhythmia: N/A       Inability to Ambulate Safely: N/A       Need for Therapeutic Anticoagulation: N/A       Antiplatelet or Contrast Allergy: N/A Narrative: This is a 71 year old female with a PMH of bilateral carotid artery disease ( left severe, high grade, Right-40-50%, Type 2 DM, HTN, HLD, Bladder CA -presence of urostomy, presenting to cath holding area this am   for a Carotid Angioplasty with Dr GUILLERMINA Gant. Pt had vascular consultation with Dr Jairo Salinas-refusing CEA surgery-Pt denies any c/o CP, SOB, palpitations, dizziness, lightheadedness or neurologic deficits, and is in no acute distress. Pt REIS without any deficits. PERRLA.    MAL-2  ASA-3  GFR-57  Creat-1.01  Bleeding Risk Assessment-2.8%  COVID Negative 2/07/22  Vaccinated with Timothy HIGGINS 3/2021-no booster        Symptoms: No       Angina (Class):        Ischemic Symptoms:     Heart Failure: No       Systolic/Diastolic/Combined:        NYHA Class (within 2 weeks):   10/29/21-CT Angio of the Neck-Calcified Plaque 40-50% stenosis of Right internal Carotid, Severe high grade stenosis of the internal and external left carotid arteries.    Prior Cardiac Interventions: No       PCI's:        CABG:     Noninvasive Testing:   Stress Test: Date:        Protocol:        Duration of Exercise:        Symptoms:        EKG Changes:        DTS:        Myocardial Imaging:        Risk Assessment:     Echo: Normal LV systolic function      Associated Risk Factors:        Cerebrovascular Disease: N/A       Chronic Lung Disease: N/A       Peripheral Arterial Disease: N/A       Chronic Kidney Disease (if yes, what is GFR): N/A       Uncontrolled Diabetes (if yes, what is HgbA1C or FBS): N/A       Poorly Controlled Hypertension (if yes, what is SBP): N/A       Morbid Obesity (if yes, what is BMI): N/A       History of Recent Ventricular Arrhythmia: N/A       Inability to Ambulate Safely: N/A       Need for Therapeutic Anticoagulation: N/A       Antiplatelet or Contrast Allergy: N/A Narrative: This is a 71 year old female with a PMH of bilateral carotid artery disease ( left severe, high grade.  Right-40-50%, Type 2 DM, HTN, HLD, Bladder CA -presence of urostomy, presenting to cath holding area this am   for a Carotid Angioplasty with Dr GUILLERMINA Gant. Pt had vascular consultation with Dr Jairo Salinas - refusing CEA surgery because of general anesthesia issues in the past. -Pt denies any c/o CP, SOB, palpitations, dizziness, lightheadedness or neurologic deficits, and is in no acute distress. Pt REIS without any deficits. PERRLA.    MAL-2  ASA-3  GFR-57  Creat-1.01  Bleeding Risk Assessment-2.8%  COVID Negative 2/07/22  Vaccinated with Timothy J 3/2021-no booster        Symptoms: No       Angina (Class):        Ischemic Symptoms:     Heart Failure: No       Systolic/Diastolic/Combined:        NYHA Class (within 2 weeks):   10/29/21-CT Angio of the Neck-Calcified Plaque 40-50% stenosis of Right internal Carotid, Severe high grade stenosis of the internal and external left carotid arteries.    Prior Cardiac Interventions: No       PCI's:        CABG:     Noninvasive Testing:   Stress Test: Date:        Protocol:        Duration of Exercise:        Symptoms:        EKG Changes:        DTS:        Myocardial Imaging:        Risk Assessment:     Echo: Normal LV systolic function      Associated Risk Factors:        Cerebrovascular Disease: N/A       Chronic Lung Disease: N/A       Peripheral Arterial Disease: N/A       Chronic Kidney Disease (if yes, what is GFR): N/A       Uncontrolled Diabetes (if yes, what is HgbA1C or FBS): N/A       Poorly Controlled Hypertension (if yes, what is SBP): N/A       Morbid Obesity (if yes, what is BMI): N/A       History of Recent Ventricular Arrhythmia: N/A       Inability to Ambulate Safely: N/A       Need for Therapeutic Anticoagulation: N/A       Antiplatelet or Contrast Allergy: N/A

## 2022-02-10 NOTE — DISCHARGE NOTE PROVIDER - CARE PROVIDER_API CALL
Delia Gant)  Cardiology; Interventional Cardiology  [address] [facility], [place] [address]  Phone: [phone]  Fax: [phone]  Follow Up Time:

## 2022-02-10 NOTE — H&P PST ADULT - NSICDXPASTMEDICALHX_GEN_ALL_CORE_FT
PAST MEDICAL HISTORY:  Bladder cancer Has ilial conduit-urostomy- drainage bag    DM (diabetes mellitus)     Hyperlipidemia     Hypertension

## 2022-02-10 NOTE — DISCHARGE NOTE PROVIDER - NSDCCPTREATMENT_GEN_ALL_CORE_FT
PRINCIPAL PROCEDURE  Procedure: Angioplasty of left carotid artery  Findings and Treatment: Left Carotid stent XACT via LFA

## 2022-02-10 NOTE — DISCHARGE NOTE PROVIDER - HOSPITAL COURSE
-ADMIT to Cardiology Service -Dr Gant s/p Left Carotid Stent via LFA  -Left groin precautions  -bedrest x 4 hours post procedure  -Monitor Neuro signs  along with Vital signs per routine post procedure   -Goal SBP is to maintain 120 mmHg or greater with Dopamine infusion titration/ neosynephrine prn  -IV Normal Saline @ 100 cc/hr  -EKG post procedure  -labs and EKG in am  -Continue Dual anti platelet therapy with aspirin/ plavix    -Continue statin therapy  -follow up outpt in 1 week with Dr Gant   -Lifestyle modifications discussed to reduce cardiovascular risk factors including weight reduction, smoking cessation, medication compliance, and routine follow up with Cardiologist to track your BMI, cholesterol, and glucose levels.   -Discharge in am if overnight tele, EKG, labs in am all neuro and vital signs remain WNL Pt is with HTN , h/o bladder ca admitted to  Cardiology Service -Dr Gant s/p Left Carotid Stent via LFA on Feb 10 th   was on Charles and Dopamine infusions during the case. Pt received a dose of Atropine x1 adry-procedure for bradycardia. Was weaned off of Charles post-procedure, however remains on Dopamine 13mcg/kg/m and unable to be weaned off 2/2 hypotension. ICU consulted for further management. She had been off dopamin since yesterday and stable , BP improved , while on dopamine she developed an episode of atrial fib   Currently stable downgraded to medicine service in am , cardiology follow up noted   Pt is stable fir discharge   hold off starting BP meds due to normal low BP range prevent hypotension     total time spend coordinating care 40 min

## 2022-02-10 NOTE — PROGRESS NOTE ADULT - SUBJECTIVE AND OBJECTIVE BOX
Department of Cardiology                                                                  [facility]/[place] [address]                                                            Telephone: [phone]. Fax:[phone]                                                    Post- Procedure Note: Left Carotid Stent via LFA       Narrative:  71y  Female is now s/p left carotid stent ( Xact 9.7 [id number] via #6Fr LFA ( now with Perclose closure agent) by Dr Delia Gant     Pt received 5,000 Units Heparin,   Admit to 3GUL/ ONU     PAST MEDICAL & SURGICAL HISTORY:  DM (diabetes mellitus)    Bladder cancer  Has ilial conduit-urostomy- drainage bag    Hyperlipidemia    Hypertension      Home Medications:  aspirin 81 mg oral tablet, chewable: 1 tab(s) orally once a day (10 Feb 2022 08:39)  Cranberry oral capsule: orally once a day (10 Feb 2022 08:43)  irbesartan 300 mg oral tablet: 1 tab(s) orally once a day (10 Feb 2022 08:30)  magnesium carbonate 250 mg oral capsule: 2  orally once a day (10 Feb 2022 08:41)  Multi-Day Plus Minerals oral tablet: 1 tab(s) orally once a day (10 Feb 2022 08:42)  Plavix 75 mg oral tablet:  (10 Feb 2022 08:38)  rosuvastatin 5 mg oral tablet: 1 tab(s) orally once a day (10 Feb 2022 08:28)  Senna 8.6 mg oral tablet: 1 tab(s) orally once a day (at bedtime) (10 Feb 2022 08:42)  Starlix 60 mg oral tablet: 1 tab(s) orally 3 times a day (before meals) (10 Feb 2022 08:29)  Tradjenta 5 mg oral tablet: 1 tab(s) orally once a day (10 Feb 2022 08:29)        Levaquin (Unknown)  Originally Entered as [Unknown] reaction to [famciclovir] (Unknown)  sulfa drugs (Unknown)      Objective:  Vital Signs Last 24 Hrs  T(C): 36.4 (10 Feb 2022 08:02), Max: 36.4 (10 Feb 2022 08:02)  T(F): 97.5 (10 Feb 2022 08:02), Max: 97.5 (10 Feb 2022 08:02)  HR: 69 (10 Feb 2022 11:35) (62 - 90)  BP: 143/63 (10 Feb 2022 11:35) (113/53 - 161/73)  BP(mean): 102 (10 Feb 2022 08:02) (102 - 102)  RR: 16 (10 Feb 2022 11:35) (14 - 20)  SpO2: 100% (10 Feb 2022 11:35) (98% - 100%)    GENERAL: Pt lying comfortably, NAD.  ENMT: PERRL, +EOMI.  NECK: soft, Supple, No JVD,   CHEST/LUNG: Clear to auscultatation bilaterally; No wheezing.  HEART: S1S2+, Regular rate and rhythm; No murmurs.  ABDOMEN: Soft, Nontender, Nondistended; Bowel sounds present.  MUSCULOSKELETAL: Normal range of motion.  SKIN: No rashes or lesions.  NEURO: AAOX3, no focal deficits, no motor r sensory loss.   PSYCH: normal mood.  Procedure site: ..LFA.......( now with Perclose)., no signs of bleeding or hematoma, neurovascular intact   VASCULAR:   PT +2 R/+2 L  DP +2 R/+2 L                          12.5   6.06  )-----------( 292      ( 10 Feb 2022 08:15 )             38.4     02-10    142  |  104  |  26.3<H>  ----------------------------<  172<H>  3.9   |  22.0  |  1.01    Ca    9.6      10 Feb 2022 08:15      PT/INR - ( 10 Feb 2022 08:46 )   PT: 11.7 sec;   INR: 1.01 ratio         PTT - ( 10 Feb 2022 08:46 )  PTT:29.1 sec    Patient is a 71y old  Female who presents with a chief complaint of Left Carotid Angioplasty (10 Feb 2022 08:02)    71y  Female is now s/p left carotid stent ( Xact 9.7 [id number] via #6Fr LFA ( now with Perclose closure agent) by Dr Delia Gant       -ADMIT to Cardiology Service -Dr Gant  -Left groin precautions  -bedrest x 4 hours post procedure  -Monitor Neuro signs  along with Vital signs per routine post procedure   -Goal SBP is to maintain 120 mmHg or greater with Dopamine infusion titration/ neosynephrine prn  -IV Normal Saline @ 100 cc/hr  -EKG post procedure  -labs and EKG in am  -Continue Dual anti platelet therapy with aspirin/ plavix    -Continue statin therapy  -follow up outpt in 1 week with Dr Gant   -Lifestyle modifications discussed to reduce cardiovascular risk factors including weight reduction, smoking cessation, medication compliance, and routine follow up with Cardiologist to track your BMI, cholesterol, and glucose levels.   -Discharge in am if overnight tele, EKG, labs in am all neuro and vital signs remain WNL

## 2022-02-11 LAB
A1C WITH ESTIMATED AVERAGE GLUCOSE RESULT: 6.7 % — HIGH (ref 4–5.6)
ANION GAP SERPL CALC-SCNC: 12 MMOL/L — SIGNIFICANT CHANGE UP (ref 5–17)
ANION GAP SERPL CALC-SCNC: 14 MMOL/L — SIGNIFICANT CHANGE UP (ref 5–17)
BUN SERPL-MCNC: 17.9 MG/DL — SIGNIFICANT CHANGE UP (ref 8–20)
BUN SERPL-MCNC: 18.2 MG/DL — SIGNIFICANT CHANGE UP (ref 8–20)
CALCIUM SERPL-MCNC: 8.2 MG/DL — LOW (ref 8.6–10.2)
CALCIUM SERPL-MCNC: 8.3 MG/DL — LOW (ref 8.6–10.2)
CHLORIDE SERPL-SCNC: 105 MMOL/L — SIGNIFICANT CHANGE UP (ref 98–107)
CHLORIDE SERPL-SCNC: 109 MMOL/L — HIGH (ref 98–107)
CO2 SERPL-SCNC: 18 MMOL/L — LOW (ref 22–29)
CO2 SERPL-SCNC: 20 MMOL/L — LOW (ref 22–29)
CREAT SERPL-MCNC: 0.91 MG/DL — SIGNIFICANT CHANGE UP (ref 0.5–1.3)
CREAT SERPL-MCNC: 1 MG/DL — SIGNIFICANT CHANGE UP (ref 0.5–1.3)
ESTIMATED AVERAGE GLUCOSE: 146 MG/DL — HIGH (ref 68–114)
GLUCOSE BLDC GLUCOMTR-MCNC: 167 MG/DL — HIGH (ref 70–99)
GLUCOSE BLDC GLUCOMTR-MCNC: 204 MG/DL — HIGH (ref 70–99)
GLUCOSE BLDC GLUCOMTR-MCNC: 226 MG/DL — HIGH (ref 70–99)
GLUCOSE BLDC GLUCOMTR-MCNC: 237 MG/DL — HIGH (ref 70–99)
GLUCOSE SERPL-MCNC: 195 MG/DL — HIGH (ref 70–99)
GLUCOSE SERPL-MCNC: 246 MG/DL — HIGH (ref 70–99)
HCT VFR BLD CALC: 32.8 % — LOW (ref 34.5–45)
HCT VFR BLD CALC: 33.5 % — LOW (ref 34.5–45)
HGB BLD-MCNC: 10.6 G/DL — LOW (ref 11.5–15.5)
HGB BLD-MCNC: 11.1 G/DL — LOW (ref 11.5–15.5)
MAGNESIUM SERPL-MCNC: 1.7 MG/DL — SIGNIFICANT CHANGE UP (ref 1.6–2.6)
MCHC RBC-ENTMCNC: 29.9 PG — SIGNIFICANT CHANGE UP (ref 27–34)
MCHC RBC-ENTMCNC: 31.2 PG — SIGNIFICANT CHANGE UP (ref 27–34)
MCHC RBC-ENTMCNC: 32.3 GM/DL — SIGNIFICANT CHANGE UP (ref 32–36)
MCHC RBC-ENTMCNC: 33.1 GM/DL — SIGNIFICANT CHANGE UP (ref 32–36)
MCV RBC AUTO: 92.7 FL — SIGNIFICANT CHANGE UP (ref 80–100)
MCV RBC AUTO: 94.1 FL — SIGNIFICANT CHANGE UP (ref 80–100)
PHOSPHATE SERPL-MCNC: 3.7 MG/DL — SIGNIFICANT CHANGE UP (ref 2.4–4.7)
PLATELET # BLD AUTO: 248 K/UL — SIGNIFICANT CHANGE UP (ref 150–400)
PLATELET # BLD AUTO: SIGNIFICANT CHANGE UP K/UL (ref 150–400)
POTASSIUM SERPL-MCNC: 4.1 MMOL/L — SIGNIFICANT CHANGE UP (ref 3.5–5.3)
POTASSIUM SERPL-MCNC: 4.4 MMOL/L — SIGNIFICANT CHANGE UP (ref 3.5–5.3)
POTASSIUM SERPL-SCNC: 4.1 MMOL/L — SIGNIFICANT CHANGE UP (ref 3.5–5.3)
POTASSIUM SERPL-SCNC: 4.4 MMOL/L — SIGNIFICANT CHANGE UP (ref 3.5–5.3)
RBC # BLD: 3.54 M/UL — LOW (ref 3.8–5.2)
RBC # BLD: 3.56 M/UL — LOW (ref 3.8–5.2)
RBC # FLD: 12.6 % — SIGNIFICANT CHANGE UP (ref 10.3–14.5)
RBC # FLD: 12.7 % — SIGNIFICANT CHANGE UP (ref 10.3–14.5)
SODIUM SERPL-SCNC: 137 MMOL/L — SIGNIFICANT CHANGE UP (ref 135–145)
SODIUM SERPL-SCNC: 141 MMOL/L — SIGNIFICANT CHANGE UP (ref 135–145)
WBC # BLD: 10.19 K/UL — SIGNIFICANT CHANGE UP (ref 3.8–10.5)
WBC # BLD: 12.09 K/UL — HIGH (ref 3.8–10.5)
WBC # FLD AUTO: 10.19 K/UL — SIGNIFICANT CHANGE UP (ref 3.8–10.5)
WBC # FLD AUTO: 12.09 K/UL — HIGH (ref 3.8–10.5)

## 2022-02-11 PROCEDURE — 99233 SBSQ HOSP IP/OBS HIGH 50: CPT

## 2022-02-11 PROCEDURE — 93010 ELECTROCARDIOGRAM REPORT: CPT

## 2022-02-11 RX ORDER — ONDANSETRON 8 MG/1
4 TABLET, FILM COATED ORAL EVERY 6 HOURS
Refills: 0 | Status: DISCONTINUED | OUTPATIENT
Start: 2022-02-11 | End: 2022-02-11

## 2022-02-11 RX ORDER — MAGNESIUM SULFATE 500 MG/ML
2 VIAL (ML) INJECTION ONCE
Refills: 0 | Status: COMPLETED | OUTPATIENT
Start: 2022-02-11 | End: 2022-02-11

## 2022-02-11 RX ORDER — INSULIN GLARGINE 100 [IU]/ML
15 INJECTION, SOLUTION SUBCUTANEOUS ONCE
Refills: 0 | Status: COMPLETED | OUTPATIENT
Start: 2022-02-11 | End: 2022-02-11

## 2022-02-11 RX ORDER — INSULIN GLARGINE 100 [IU]/ML
15 INJECTION, SOLUTION SUBCUTANEOUS EVERY MORNING
Refills: 0 | Status: DISCONTINUED | OUTPATIENT
Start: 2022-02-12 | End: 2022-02-13

## 2022-02-11 RX ORDER — ONDANSETRON 8 MG/1
4 TABLET, FILM COATED ORAL EVERY 6 HOURS
Refills: 0 | Status: DISCONTINUED | OUTPATIENT
Start: 2022-02-11 | End: 2022-02-13

## 2022-02-11 RX ORDER — DOPAMINE HYDROCHLORIDE 40 MG/ML
9.98 INJECTION, SOLUTION, CONCENTRATE INTRAVENOUS
Qty: 400 | Refills: 0 | Status: DISCONTINUED | OUTPATIENT
Start: 2022-02-11 | End: 2022-02-13

## 2022-02-11 RX ORDER — ACETAMINOPHEN 500 MG
650 TABLET ORAL EVERY 6 HOURS
Refills: 0 | Status: DISCONTINUED | OUTPATIENT
Start: 2022-02-11 | End: 2022-02-13

## 2022-02-11 RX ORDER — SODIUM CHLORIDE 9 MG/ML
1000 INJECTION, SOLUTION INTRAVENOUS ONCE
Refills: 0 | Status: COMPLETED | OUTPATIENT
Start: 2022-02-11 | End: 2022-02-11

## 2022-02-11 RX ADMIN — Medication 2: at 08:51

## 2022-02-11 RX ADMIN — SODIUM CHLORIDE 100 MILLILITER(S): 9 INJECTION INTRAMUSCULAR; INTRAVENOUS; SUBCUTANEOUS at 12:20

## 2022-02-11 RX ADMIN — ONDANSETRON 104 MILLIGRAM(S): 8 TABLET, FILM COATED ORAL at 01:53

## 2022-02-11 RX ADMIN — Medication 2: at 16:25

## 2022-02-11 RX ADMIN — Medication 81 MILLIGRAM(S): at 11:33

## 2022-02-11 RX ADMIN — CLOPIDOGREL BISULFATE 75 MILLIGRAM(S): 75 TABLET, FILM COATED ORAL at 11:34

## 2022-02-11 RX ADMIN — MIDODRINE HYDROCHLORIDE 10 MILLIGRAM(S): 2.5 TABLET ORAL at 05:32

## 2022-02-11 RX ADMIN — Medication 2: at 11:34

## 2022-02-11 RX ADMIN — Medication 650 MILLIGRAM(S): at 21:28

## 2022-02-11 RX ADMIN — Medication 650 MILLIGRAM(S): at 22:28

## 2022-02-11 RX ADMIN — SODIUM CHLORIDE 1000 MILLILITER(S): 9 INJECTION, SOLUTION INTRAVENOUS at 18:39

## 2022-02-11 RX ADMIN — ATORVASTATIN CALCIUM 20 MILLIGRAM(S): 80 TABLET, FILM COATED ORAL at 21:27

## 2022-02-11 RX ADMIN — MIDODRINE HYDROCHLORIDE 10 MILLIGRAM(S): 2.5 TABLET ORAL at 13:01

## 2022-02-11 RX ADMIN — Medication 25 GRAM(S): at 15:35

## 2022-02-11 RX ADMIN — DOPAMINE HYDROCHLORIDE 25.8 MICROGRAM(S)/KG/MIN: 40 INJECTION, SOLUTION, CONCENTRATE INTRAVENOUS at 17:10

## 2022-02-11 RX ADMIN — DOPAMINE HYDROCHLORIDE 25.8 MICROGRAM(S)/KG/MIN: 40 INJECTION, SOLUTION, CONCENTRATE INTRAVENOUS at 00:30

## 2022-02-11 RX ADMIN — Medication 1 TABLET(S): at 11:33

## 2022-02-11 RX ADMIN — INSULIN GLARGINE 15 UNIT(S): 100 INJECTION, SOLUTION SUBCUTANEOUS at 16:25

## 2022-02-11 RX ADMIN — DOPAMINE HYDROCHLORIDE 25.8 MICROGRAM(S)/KG/MIN: 40 INJECTION, SOLUTION, CONCENTRATE INTRAVENOUS at 06:36

## 2022-02-11 NOTE — PROGRESS NOTE ADULT - ASSESSMENT
71F PMH B/L carotid artery disease (left severe, high grade, right-40-50%), NIDDM, HTN, HLD, Bladder CA with urostomy admitted for a elective CEA performed 2/10 c/b bradycardia requiring dopamine infusion      Impression/Plan:    Bradycardia - likely 2/2 increased arterial pulsation transmission to carotid sinus nerve/baroreceptor  - Continue to wean Dopamine  - Monitor rhythm on telemetry  - Replete lytes PRN    Hypotension  - Holding Midodrine as this can exacerbate bradycardia  - Can give IVF boluses PRN  - Maintain MAP >65  - On dopamine infusion as above    B/L carotid artery disease  - S/p L CEA on 2/10/22  - C/w ASA/Plavix  - Statin  - Monitor L groin site    NIDDM  Hyperglycemia  - C/w Lantus, ISS  - FSG Q6H    F/E/N/PPx/Lines  - NS @ 100cc/hr  - Replete lytes for K>4 and Mg>2  - DASH diet  - VTE PPx - is on DAPT, OOBTC  - PIV    Ethics/Dispo  - Full code  - C/w care in ICU      Rodo Brandt M.D.  Pulmonary & Critical Care Medicine  [facility]  Pulmonary and Sleep Medicine at [place]  [address]  [place] [address]  T: [phone]  F: [phone]

## 2022-02-11 NOTE — PROGRESS NOTE ADULT - SUBJECTIVE AND OBJECTIVE BOX
[facility]  F[facility]ACT[facility]  [address] [place] [id number]    REASON FOR VISIT:  Follow up s/p  s/p left carotid stent ( Xact 9.7 [id number] via #6Fr LFA ( now with Perclose closure agent) by Dr Delia Gant   UPDATE: was upgraded to ICU yesterday due to hypotension and bradycardia associated with nausea  Has had episodes of PAF during monitoring  now in sinus  On dopamine and midodrine.  dopamine being weaned off  Ekg pending    02-11    137  |  105  |  18.2  ----------------------------<  246<H>  4.4   |  18.0<L>  |  1.00    Ca    8.3<L>      11 Feb 2022 05:16  Phos  3.7     02-11  Mg     1.7     02-11    TPro  6.7  /  Alb  4.1  /  TBili  0.4  /  DBili  x   /  AST  17  /  ALT  15  /  AlkPhos  82  02-10    LIVER FUNCTIONS - ( 10 Feb 2022 20:34 )  Alb: 4.1 g/dL / Pro: 6.7 g/dL / ALK PHOS: 82 U/L / ALT: 15 U/L / AST: 17 U/L / GGT: x             MEDICATIONS  (STANDING):  aspirin enteric coated 81 milliGRAM(s) Oral daily  atorvastatin 20 milliGRAM(s) Oral at bedtime  chlorhexidine 4% Liquid 1 Application(s) Topical once  clopidogrel Tablet 75 milliGRAM(s) Oral daily  DOPamine Infusion 10 MICROgram(s)/kG/Min (25.8 mL/Hr) IV Continuous <Continuous>  glucagon  Injectable 1 milliGRAM(s) IntraMuscular once  insulin lispro (ADMELOG) corrective regimen sliding scale   SubCutaneous three times a day before meals  insulin lispro (ADMELOG) corrective regimen sliding scale   SubCutaneous at bedtime  midodrine 10 milliGRAM(s) Oral every 8 hours  multivitamin/minerals 1 Tablet(s) Oral daily  sodium chloride 0.9%. 1000 milliLiter(s) (100 mL/Hr) IV Continuous <Continuous>    ROS:    No fever chills  Cardiac  No cp sob or palp  Resp  no cough no mucus production  Gi  no abd pain + NAUSEA  Ext No calf tenderness, no edema    Vital Signs Last 24 Hrs  T(C): 37.2 (11 Feb 2022 07:58), Max: 37.2 (11 Feb 2022 07:58)  T(F): 99 (11 Feb 2022 07:58), Max: 99 (11 Feb 2022 07:58)  HR: 56 (11 Feb 2022 08:00) (56 - 111)  BP: 130/55 (11 Feb 2022 08:00) (100/49 - 150/65)  BP(mean): 74 (11 Feb 2022 08:00) (62 - 89)  RR: 16 (11 Feb 2022 08:00) (14 - 29)  SpO2: 98% (11 Feb 2022 08:00) (95% - 100%)  T(C): 37.2 (02-11-22 @ 07:58), Max: 37.2 (02-11-22 @ 07:58)  HR: 56 (02-11-22 @ 08:00) (56 - 111)  BP: 130/55 (02-11-22 @ 08:00) (100/49 - 150/65)  RR: 16 (02-11-22 @ 08:00) (14 - 29)  SpO2: 98% (02-11-22 @ 08:00) (95% - 100%)    HEENT Head atraumatic eomi, oral mucosa moist  CV S1&S2      No r/g/ m   RESP  clear  GI  Soft active bowel sounds non tender  EXT  No clubbing/Cyanosis /Edema  Left groin with good pulse slight ecchymosis  NEURO  Alert oriented  No gross motor or sensory deficits

## 2022-02-11 NOTE — PATIENT PROFILE ADULT - NSPROHARDOFHEAROTHER_GEN_ALL_CORE
What Is The Reason For Today's Visit?: Full Body Skin Examination What Is The Reason For Today's Visit? (Being Monitored For X): concerning skin lesions on an annual basis right ear mild hearing impairment as per patient

## 2022-02-11 NOTE — PROGRESS NOTE ADULT - ASSESSMENT
71 year old female with a PMH of bilateral carotid artery disease ( left severe, high grade.  Right-40-50%, Type 2 DM, HTN, HLD, Bladder CA -presence of urostomy, presenting to cath holding area this am   for a Carotid Angioplasty with Dr GUILLERMINA Gant. Pt had vascular consultation with Dr Jairo Salinas - refusing CEA surgery because of general anesthesia issues in the past. -Pt denies any c/o CP, SOB, palpitations, dizziness, lightheadedness or neurologic deficits, and is in no acute distress. Pt REIS without any deficits. PERRLA.   s/p left carotid stent ( Xact 9.7 [id number] via #6Fr LFA ( now with Perclose closure agent) by Dr Delia Gant     S/P LEFT CAROTIC STENT  asa plavix  atorvastatin  groin stable    HYPOTENSION  B/P improved wean off dopamine   midodrine 10tid    NAUSEA  Zofran prn    PAF  in the setting with pressors   Dom score of 3  will consider anticoaguations        CARDIAC MEDS  MEDICATIONS  (STANDING):  aspirin enteric coated 81 milliGRAM(s) Oral daily  atorvastatin 20 milliGRAM(s) Oral at bedtime  clopidogrel Tablet 75 milliGRAM(s) Oral daily  DOPamine Infusion 10 MICROgram(s)/kG/Min (25.8 mL/Hr) IV Continuous <Continuous>  midodrine 10 milliGRAM(s) Oral every 8 hours   71 year old female with a PMH of bilateral carotid artery disease ( left severe, high grade.  Right-40-50%, Type 2 DM, HTN, HLD, Bladder CA -presence of urostomy, presenting to cath holding area this am  (NORMAL EF AND NEGATIVE STRESS TEST LAST 6 MONTHS)  for a Carotid Angioplasty with Dr GUILLERMINA Gant. Pt had vascular consultation with Dr Jairo Salinas - refusing CEA surgery because of general anesthesia issues in the past. -Pt denies any c/o CP, SOB, palpitations, dizziness, lightheadedness or neurologic deficits, and is in no acute distress. Pt REIS without any deficits. PERRLA.   s/p left carotid stent ( Xact 9.7 [id number] via #6Fr LFA ( now with Perclose closure agent) by Dr Delia Gant     S/P LEFT CAROTIC STENT  asa plavix  atorvastatin  groin stable    HYPOTENSION  B/P improved wean off dopamine   midodrine 10tid    NAUSEA  Zofran prn    PAF  in the setting with pressors   Dom score of 3  will consider anticoaguations        CARDIAC MEDS  MEDICATIONS  (STANDING):  aspirin enteric coated 81 milliGRAM(s) Oral daily  atorvastatin 20 milliGRAM(s) Oral at bedtime  clopidogrel Tablet 75 milliGRAM(s) Oral daily  DOPamine Infusion 10 MICROgram(s)/kG/Min (25.8 mL/Hr) IV Continuous <Continuous>  midodrine 10 milliGRAM(s) Oral every 8 hours

## 2022-02-11 NOTE — PROGRESS NOTE ADULT - SUBJECTIVE AND OBJECTIVE BOX
Patient is a 71y old  Female who presents with a chief complaint of Left Carotid Angioplasty (10 Feb 2022 11:59)      BRIEF HOSPITAL COURSE:   71F PMH bilateral carotid artery disease ( left severe, high grade. Right-40-50%, Type 2 DM, HTN, HLD, Bladder CA -presence of urostomy admitted for a elective Carotid Angioplasty with Dr GUILLERMINA Gant. Pt underwent carotid stent x1, was on Charles and Dopamine infusions during the case. Pt received a dose of Atropine x1 dary-procedure for bradycardia. Was weaned off of Charles post-procedure but remained on dopamine infusion and admitted to the ICU.          PAST MEDICAL & SURGICAL HISTORY:  DM (diabetes mellitus)    Bladder cancer  Has ilial conduit-urostomy- drainage bag    Hyperlipidemia    Hypertension          Medications:    DOPamine Infusion 10 MICROgram(s)/kG/Min IV Continuous <Continuous>  midodrine 10 milliGRAM(s) Oral every 8 hours      ondansetron Injectable 4 milliGRAM(s) IV Push every 6 hours PRN      aspirin enteric coated 81 milliGRAM(s) Oral daily  clopidogrel Tablet 75 milliGRAM(s) Oral daily        atorvastatin 20 milliGRAM(s) Oral at bedtime  dextrose 40% Gel 15 Gram(s) Oral once  dextrose 50% Injectable 25 Gram(s) IV Push once  dextrose 50% Injectable 12.5 Gram(s) IV Push once  dextrose 50% Injectable 25 Gram(s) IV Push once  glucagon  Injectable 1 milliGRAM(s) IntraMuscular once  insulin glargine Injectable (LANTUS) 15 Unit(s) SubCutaneous once  insulin lispro (ADMELOG) corrective regimen sliding scale   SubCutaneous three times a day before meals  insulin lispro (ADMELOG) corrective regimen sliding scale   SubCutaneous at bedtime    dextrose 5%. 1000 milliLiter(s) IV Continuous <Continuous>  dextrose 5%. 1000 milliLiter(s) IV Continuous <Continuous>  multivitamin/minerals 1 Tablet(s) Oral daily  sodium chloride 0.9%. 1000 milliLiter(s) IV Continuous <Continuous>      chlorhexidine 4% Liquid 1 Application(s) Topical once            ICU Vital Signs Last 24 Hrs  T(C): 36.8 (11 Feb 2022 11:54), Max: 37.2 (11 Feb 2022 07:58)  T(F): 98.3 (11 Feb 2022 11:54), Max: 99 (11 Feb 2022 07:58)  HR: 56 (11 Feb 2022 12:00) (55 - 111)  BP: 103/46 (11 Feb 2022 12:00) (100/49 - 148/58)  BP(mean): 65 (11 Feb 2022 12:00) (62 - 89)  ABP: --  ABP(mean): --  RR: 18 (11 Feb 2022 12:00) (14 - 29)  SpO2: 98% (11 Feb 2022 12:00) (95% - 100%)          I&O's Detail    10 Feb 2022 07:01  -  11 Feb 2022 07:00  --------------------------------------------------------  IN:    DOPamine Infusion: 442.9 mL    Oral Fluid: 120 mL    sodium chloride 0.9%: 1200 mL  Total IN: 1762.9 mL    OUT:    Urostomy (mL): 1100 mL    Urostomy (mL): 600 mL    Voided (mL): 600 mL  Total OUT: 2300 mL    Total NET: -537.1 mL      11 Feb 2022 07:01  -  11 Feb 2022 12:55  --------------------------------------------------------  IN:    DOPamine Infusion: 165 mL    sodium chloride 0.9%: 500 mL  Total IN: 665 mL    OUT:    Urostomy (mL): 950 mL  Total OUT: 950 mL    Total NET: -285 mL            LABS:                        11.1   12.09 )-----------( 248      ( 11 Feb 2022 07:03 )             33.5     02-11    137  |  105  |  18.2  ----------------------------<  246<H>  4.4   |  18.0<L>  |  1.00    Ca    8.3<L>      11 Feb 2022 05:16  Phos  3.7     02-11  Mg     1.7     02-11    TPro  6.7  /  Alb  4.1  /  TBili  0.4  /  DBili  x   /  AST  17  /  ALT  15  /  AlkPhos  82  02-10          CAPILLARY BLOOD GLUCOSE      POCT Blood Glucose.: 226 mg/dL (11 Feb 2022 11:30)    PT/INR - ( 10 Feb 2022 08:46 )   PT: 11.7 sec;   INR: 1.01 ratio         PTT - ( 10 Feb 2022 08:46 )  PTT:29.1 sec    CULTURES:      Physical Examination:  GENERAL: In NAD   HEENT: NC/AT  NECK: Supple  PULM: CTA B/L  CVS: +S1, S2  ABD: Soft, non-tender  EXT: No pedal edema  SKIN: Warm and well perfused, no rashes noted.  NEURO: Grossly non-focal    DEVICES:     RADIOLOGY:

## 2022-02-11 NOTE — PATIENT PROFILE ADULT - FALL HARM RISK - HARM RISK INTERVENTIONS

## 2022-02-12 LAB
ANION GAP SERPL CALC-SCNC: 12 MMOL/L — SIGNIFICANT CHANGE UP (ref 5–17)
BUN SERPL-MCNC: 14.9 MG/DL — SIGNIFICANT CHANGE UP (ref 8–20)
CALCIUM SERPL-MCNC: 8.1 MG/DL — LOW (ref 8.6–10.2)
CHLORIDE SERPL-SCNC: 109 MMOL/L — HIGH (ref 98–107)
CO2 SERPL-SCNC: 20 MMOL/L — LOW (ref 22–29)
CORTIS AM PEAK SERPL-MCNC: 21.6 UG/DL — HIGH (ref 6–18.4)
CREAT SERPL-MCNC: 0.83 MG/DL — SIGNIFICANT CHANGE UP (ref 0.5–1.3)
GLUCOSE BLDC GLUCOMTR-MCNC: 127 MG/DL — HIGH (ref 70–99)
GLUCOSE BLDC GLUCOMTR-MCNC: 158 MG/DL — HIGH (ref 70–99)
GLUCOSE BLDC GLUCOMTR-MCNC: 158 MG/DL — HIGH (ref 70–99)
GLUCOSE BLDC GLUCOMTR-MCNC: 173 MG/DL — HIGH (ref 70–99)
GLUCOSE SERPL-MCNC: 183 MG/DL — HIGH (ref 70–99)
HCT VFR BLD CALC: 30.3 % — LOW (ref 34.5–45)
HGB BLD-MCNC: 9.8 G/DL — LOW (ref 11.5–15.5)
MAGNESIUM SERPL-MCNC: 1.9 MG/DL — SIGNIFICANT CHANGE UP (ref 1.6–2.6)
MCHC RBC-ENTMCNC: 30.5 PG — SIGNIFICANT CHANGE UP (ref 27–34)
MCHC RBC-ENTMCNC: 32.3 GM/DL — SIGNIFICANT CHANGE UP (ref 32–36)
MCV RBC AUTO: 94.4 FL — SIGNIFICANT CHANGE UP (ref 80–100)
PHOSPHATE SERPL-MCNC: 2.4 MG/DL — SIGNIFICANT CHANGE UP (ref 2.4–4.7)
PLATELET # BLD AUTO: 215 K/UL — SIGNIFICANT CHANGE UP (ref 150–400)
POTASSIUM SERPL-MCNC: 3.9 MMOL/L — SIGNIFICANT CHANGE UP (ref 3.5–5.3)
POTASSIUM SERPL-SCNC: 3.9 MMOL/L — SIGNIFICANT CHANGE UP (ref 3.5–5.3)
RBC # BLD: 3.21 M/UL — LOW (ref 3.8–5.2)
RBC # FLD: 12.9 % — SIGNIFICANT CHANGE UP (ref 10.3–14.5)
SODIUM SERPL-SCNC: 140 MMOL/L — SIGNIFICANT CHANGE UP (ref 135–145)
TSH SERPL-MCNC: 0.22 UIU/ML — LOW (ref 0.27–4.2)
WBC # BLD: 6.8 K/UL — SIGNIFICANT CHANGE UP (ref 3.8–10.5)
WBC # FLD AUTO: 6.8 K/UL — SIGNIFICANT CHANGE UP (ref 3.8–10.5)

## 2022-02-12 PROCEDURE — 93306 TTE W/DOPPLER COMPLETE: CPT | Mod: 26

## 2022-02-12 RX ORDER — POTASSIUM PHOSPHATE, MONOBASIC POTASSIUM PHOSPHATE, DIBASIC 236; 224 MG/ML; MG/ML
15 INJECTION, SOLUTION INTRAVENOUS ONCE
Refills: 0 | Status: DISCONTINUED | OUTPATIENT
Start: 2022-02-12 | End: 2022-02-12

## 2022-02-12 RX ORDER — SODIUM,POTASSIUM PHOSPHATES 278-250MG
1 POWDER IN PACKET (EA) ORAL ONCE
Refills: 0 | Status: COMPLETED | OUTPATIENT
Start: 2022-02-12 | End: 2022-02-12

## 2022-02-12 RX ORDER — ENOXAPARIN SODIUM 100 MG/ML
40 INJECTION SUBCUTANEOUS DAILY
Refills: 0 | Status: DISCONTINUED | OUTPATIENT
Start: 2022-02-12 | End: 2022-02-13

## 2022-02-12 RX ORDER — MAGNESIUM SULFATE 500 MG/ML
2 VIAL (ML) INJECTION ONCE
Refills: 0 | Status: COMPLETED | OUTPATIENT
Start: 2022-02-12 | End: 2022-02-12

## 2022-02-12 RX ADMIN — Medication 1 PACKET(S): at 11:47

## 2022-02-12 RX ADMIN — ATORVASTATIN CALCIUM 20 MILLIGRAM(S): 80 TABLET, FILM COATED ORAL at 21:22

## 2022-02-12 RX ADMIN — Medication 650 MILLIGRAM(S): at 21:31

## 2022-02-12 RX ADMIN — Medication 1: at 12:02

## 2022-02-12 RX ADMIN — Medication 1: at 08:01

## 2022-02-12 RX ADMIN — Medication 1 TABLET(S): at 11:47

## 2022-02-12 RX ADMIN — Medication 81 MILLIGRAM(S): at 11:47

## 2022-02-12 RX ADMIN — CLOPIDOGREL BISULFATE 75 MILLIGRAM(S): 75 TABLET, FILM COATED ORAL at 11:47

## 2022-02-12 RX ADMIN — ENOXAPARIN SODIUM 40 MILLIGRAM(S): 100 INJECTION SUBCUTANEOUS at 11:48

## 2022-02-12 RX ADMIN — INSULIN GLARGINE 15 UNIT(S): 100 INJECTION, SOLUTION SUBCUTANEOUS at 08:02

## 2022-02-12 RX ADMIN — Medication 650 MILLIGRAM(S): at 21:25

## 2022-02-12 RX ADMIN — Medication 25 GRAM(S): at 09:43

## 2022-02-12 NOTE — PROGRESS NOTE ADULT - SUBJECTIVE AND OBJECTIVE BOX
[facility]                                                               Office: [address] [address]                                                              Telephone: [phone]. Fax:[phone]                                                                             PROGRESS NOTE  Reason for follow up: Carotid Stenting  Update: Patient still requiring Dopamine for BP support, but no longer with any significant bradycardic events. Patient with no further episodes of Afib with RVR. Echo pending.     Review of symptoms:   Cardiac:  No chest pain. No dyspnea. No palpitations.  Respiratory: No cough. No dyspnea  Gastrointestinal: No diarrhea. No abdominal pain. No bleeding.     Past medical history: No updates.   	  Vitals:  T(C): 36.8 (02-12-22 @ 10:42), Max: 36.9 (02-11-22 @ 16:18)  HR: 71 (02-12-22 @ 09:00) (48 - 71)  BP: 119/54 (02-12-22 @ 09:00) (91/48 - 161/66)  RR: 17 (02-12-22 @ 06:00) (14 - 18)  SpO2: 100% (02-12-22 @ 09:00) (90% - 100%)  Wt(kg): --  I&O's Summary    11 Feb 2022 07:01  -  12 Feb 2022 07:00  --------------------------------------------------------  IN: 4782.6 mL / OUT: 4200 mL / NET: 582.6 mL    12 Feb 2022 07:01  -  12 Feb 2022 13:17  --------------------------------------------------------  IN: 215.4 mL / OUT: 700 mL / NET: -484.6 mL      Weight (kg): 68.9 (02-10 @ 08:41)      PHYSICAL EXAM:  Appearance: Comfortable. No acute distress  HEENT:  Head and neck: Atraumatic. Normocephalic.  Normal oral mucosa, PERRL, Neck is supple. No JVD, No carotid bruit.   Neurologic: A&Ox 3, no focal deficits. EOMI, Cranial nerves are intact.  Lymphatic: No cervical lymphadenopathy  Cardiovascular: Normal S1 S2, No murmur, rubs/gallops. No JVD, No edema  Respiratory: Lungs clear to auscultation  Gastrointestinal:  Soft, Non-tender, + BS  Lower Extremities: No edema, left groin soft, nontender, some ecchymosis   Psychiatry: Patient is calm. No agitation. Mood & affect appropriate  Skin: No rashes/ecchymoses/cyanosis/ulcers visualized on the face, hands or feet.      CURRENT MEDICATIONS:  DOPamine Infusion 9.985 MICROgram(s)/kG/Min IV Continuous <Continuous>    atorvastatin  dextrose 50% Injectable  dextrose 50% Injectable  dextrose 50% Injectable  glucagon  Injectable  insulin glargine Injectable (LANTUS)  insulin lispro (ADMELOG) corrective regimen sliding scale  insulin lispro (ADMELOG) corrective regimen sliding scale  aspirin enteric coated  chlorhexidine 4% Liquid  clopidogrel Tablet  enoxaparin Injectable  multivitamin/minerals      DIAGNOSTIC TESTING:  [ ] Echocardiogram: Pending    OTHER: 	      LABS:	 	                            9.8    6.80  )-----------( 215      ( 12 Feb 2022 05:26 )             30.3     02-12    140  |  109<H>  |  14.9  ----------------------------<  183<H>  3.9   |  20.0<L>  |  0.83    Ca    8.1<L>      12 Feb 2022 05:26  Phos  2.4     02-12  Mg     1.9     02-12    TPro  6.7  /  Alb  4.1  /  TBili  0.4  /  DBili  x   /  AST  17  /  ALT  15  /  AlkPhos  82  02-10    proBNP:   Lipid Profile:   HgA1c:   TSH: Thyroid Stimulating Hormone, Serum: 0.22 uIU/mL        TELEMETRY: Reviewed  SR, SB

## 2022-02-12 NOTE — PROGRESS NOTE ADULT - ASSESSMENT
A/P: 71 year old female with a PMH of bilateral carotid artery disease ( left severe, high grade.  Right-40-50%, Type 2 DM, HTN, HLD, Bladder CA -presence of urostomy, presenting to cath holding area this am  (NORMAL EF AND NEGATIVE STRESS TEST LAST 6 MONTHS)  for a Carotid Angioplasty with Dr GUILLERMINA Gant. Pt had vascular consultation with Dr Jairo Salinsa - refusing CEA surgery because of general anesthesia issues in the past. -Pt denies any c/o CP, SOB, palpitations, dizziness, lightheadedness or neurologic deficits, and is in no acute distress. Pt REIS without any deficits. PERRLA.   s/p left carotid stent ( Xact 9.7 [id number] via #6Fr LFA ( now with Perclose closure agent) by Dr Delia Gant     Carotid Stenosis s/p Left Carotid Stent  - Continue aspirin, plavix, and atorvastatin.   - F/u with Dr. Gant after discharge.     Hypotension  - Still requiring dopamine.   - Wean as tolerated.   - Echocardiogram pending.     Paroxysmal Atrial Fibrillation   - No further events.   - Afib with RVR but while on dopamine.   - [id number] score 4 (HTN, DMII, age, female).   - Will discuss starting AC with Dr. Alvarado and Dr. Gant.     Assessment and recommendations are final when note is signed by the attending.

## 2022-02-12 NOTE — DIETITIAN INITIAL EVALUATION ADULT. - OTHER INFO
Pt is a 71 year old female with a PMH of bilateral carotid artery disease, Type 2 DM, HTN, HLD, Bladder CA -presence of urostomy, Admitted 2/10/2022 for elective L CEA, Stent X 1 with Post-Op Bradycardia and Hypotension requiring pressor support.   Pt reports eating well at home, follows a Diabetic Diet and check Glucose levels regularly. Pt denies any recent weight changes,  lbs, consistent with current weight noted. Pt denies difficulty chewing or swallowing. Appetite fair at this time, pt reports wanting to eat "lightly" secondary to slight nausea post op. Pt declined diet education at this time.

## 2022-02-12 NOTE — DIETITIAN INITIAL EVALUATION ADULT. - PERTINENT LABORATORY DATA
02-12 Na140 mmol/L Glu 183 mg/dL<H> K+ 3.9 mmol/L Cr  0.83 mg/dL BUN 14.9 mg/dL Phos 2.4 mg/dL Alb n/a   PAB n/a

## 2022-02-12 NOTE — PROGRESS NOTE ADULT - SUBJECTIVE AND OBJECTIVE BOX
Patient is a 71y old  Female who presents with a chief complaint of Left Carotid Angioplasty (10 Feb 2022 11:59)      BRIEF HOSPITAL COURSE: 71 year old female PMHx B/L Carotid Dz (L severe, high grade. R 40-50%), DM2, HTN, HLD, Bladder Ca ( s/p Illioconduit / Uropstomy).  Admitted 2/10/2022 for elective L CEA, Stent X 1 with Post-Op Bradycardia and Hypotension requiring pressor support.     Events last 24 hours: Remains asymptomatic without complaint but remains on Dopamine for BP support,  No longer Bradycardic    PAST MEDICAL & SURGICAL HISTORY:  DM (diabetes mellitus)  Bladder cancer  Has ilial conduit-urostomy- drainage bag  Hyperlipidemia  Hypertension        Review of Systems:  CONSTITUTIONAL: No fever, chills, or fatigue  EYES: No eye pain, visual disturbances, or discharge  ENMT:  No difficulty hearing, tinnitus, vertigo; No sinus or throat pain  NECK: No pain or stiffness  RESPIRATORY: No cough, wheezing, chills or hemoptysis; No shortness of breath  CARDIOVASCULAR: No chest pain, palpitations, dizziness, or leg swelling  GASTROINTESTINAL: No abdominal or epigastric pain. No nausea, vomiting, or hematemesis; No diarrhea or constipation. No melena or hematochezia.  GENITOURINARY: No dysuria, frequency, hematuria, or incontinence  NEUROLOGICAL: No headaches, memory loss, loss of strength, numbness, or tremors  SKIN: No itching, burning, rashes, or lesions   MUSCULOSKELETAL: No joint pain or swelling; No muscle, back, or extremity pain  PSYCHIATRIC: No depression, anxiety, mood swings, or difficulty sleeping      Medications:  DOPamine Infusion 9.985 MICROgram(s)/kG/Min IV Continuous <Continuous>  acetaminophen     Tablet .. 650 milliGRAM(s) Oral every 6 hours PRN  ondansetron Injectable 4 milliGRAM(s) IV Push every 6 hours PRN  aspirin enteric coated 81 milliGRAM(s) Oral daily  clopidogrel Tablet 75 milliGRAM(s) Oral daily  atorvastatin 20 milliGRAM(s) Oral at bedtime  dextrose 40% Gel 15 Gram(s) Oral once  dextrose 50% Injectable 25 Gram(s) IV Push once  dextrose 50% Injectable 12.5 Gram(s) IV Push once  dextrose 50% Injectable 25 Gram(s) IV Push once  glucagon  Injectable 1 milliGRAM(s) IntraMuscular once  insulin glargine Injectable (LANTUS) 15 Unit(s) SubCutaneous every morning  insulin lispro (ADMELOG) corrective regimen sliding scale   SubCutaneous three times a day before meals  insulin lispro (ADMELOG) corrective regimen sliding scale   SubCutaneous at bedtime  dextrose 5%. 1000 milliLiter(s) IV Continuous <Continuous>  dextrose 5%. 1000 milliLiter(s) IV Continuous <Continuous>  multivitamin/minerals 1 Tablet(s) Oral daily  sodium chloride 0.9%. 1000 milliLiter(s) IV Continuous <Continuous>  chlorhexidine 4% Liquid 1 Application(s) Topical once        ICU Vital Signs Last 24 Hrs  T(C): 36.5 (12 Feb 2022 01:30), Max: 37.2 (11 Feb 2022 07:58)  T(F): 97.7 (12 Feb 2022 01:30), Max: 99 (11 Feb 2022 07:58)  HR: 50 (12 Feb 2022 02:00) (48 - 86)  BP: 131/55 (12 Feb 2022 02:00) (91/48 - 161/66)  BP(mean): 77 (12 Feb 2022 02:00) (58 - 92)  RR: 15 (12 Feb 2022 02:00) (14 - 18)  SpO2: 95% (12 Feb 2022 02:00) (90% - 100%)          I&O's Detail    10 Feb 2022 07:01  -  11 Feb 2022 07:00  --------------------------------------------------------  IN:    DOPamine Infusion: 442.9 mL    Oral Fluid: 120 mL    sodium chloride 0.9%: 1200 mL  Total IN: 1762.9 mL    OUT:    Urostomy (mL): 1100 mL    Urostomy (mL): 600 mL    Voided (mL): 600 mL  Total OUT: 2300 mL    Total NET: -537.1 mL      11 Feb 2022 07:01  -  12 Feb 2022 02:42  --------------------------------------------------------  IN:    DOPamine Infusion: 416.6 mL    DOPamine Infusion: 103.2 mL    IV PiggyBack: 50 mL    Lactated Ringers Bolus: 1000 mL    Oral Fluid: 820 mL    sodium chloride 0.9%: 1700 mL  Total IN: 4089.8 mL    OUT:    Urostomy (mL): 3000 mL  Total OUT: 3000 mL    Total NET: 1089.8 mL            LABS:                        11.1   12.09 )-----------( 248      ( 11 Feb 2022 07:03 )             33.5     02-11    141  |  109<H>  |  17.9  ----------------------------<  195<H>  4.1   |  20.0<L>  |  0.91    Ca    8.2<L>      11 Feb 2022 15:50  Phos  3.7     02-11  Mg     1.7     02-11    TPro  6.7  /  Alb  4.1  /  TBili  0.4  /  DBili  x   /  AST  17  /  ALT  15  /  AlkPhos  82  02-10          CAPILLARY BLOOD GLUCOSE      POCT Blood Glucose.: 167 mg/dL (11 Feb 2022 21:31)    PT/INR - ( 10 Feb 2022 08:46 )   PT: 11.7 sec;   INR: 1.01 ratio         PTT - ( 10 Feb 2022 08:46 )  PTT:29.1 sec    CULTURES:      Physical Examination:    General:  Awake.  Alert, oriented, interactive, nonfocal    HEENT: Pupils equal, reactive to light.  Symmetric. No JVD.    PULM: Clear to auscultation bilaterally, no significant sputum production    CVS: Regular rate and rhythm, no murmurs, rubs, or gallops    ABD: Soft, nondistended, nontender, normoactive bowel sounds, no masses    EXT: No edema, nontender    SKIN: Warm and well perfused, no rashes noted.              CRITICAL CARE TIME SPENT: ***  (Assessing presenting problems of acute illness, which pose high probability of life threatening deterioration or end organ damage/dysfunction, as well as medical decision making including initiating plan of care, reviewing data, reviewing radiologic exams, discussing with multidisciplinary team,  discussing goals of care with patient/family, and writing this note.  Non-inclusive of procedures performed)   Patient is a 71y old  Female who presents with a chief complaint of Left Carotid Angioplasty (10 Feb 2022 11:59)      BRIEF HOSPITAL COURSE: 71 year old female PMHx B/L Carotid Dz (L severe, high grade. R 40-50%), DM2, HTN, HLD, Bladder Ca ( s/p Illioconduit / Uropstomy).  Admitted 2/10/2022 for elective L CEA, Stent X 1 with Post-Op Bradycardia and Hypotension requiring pressor support.     Events last 24 hours: Remains asymptomatic without complaint but remains on Dopamine for BP support,  No longer Bradycardic    PAST MEDICAL & SURGICAL HISTORY:  DM (diabetes mellitus)  Bladder cancer  Has ilial conduit-urostomy- drainage bag  Hyperlipidemia  Hypertension        Review of Systems:  CONSTITUTIONAL: No fever, chills, or fatigue  EYES: No eye pain, visual disturbances, or discharge  ENMT:  No difficulty hearing, tinnitus, vertigo; No sinus or throat pain  NECK: No pain or stiffness  RESPIRATORY: No cough, wheezing, chills or hemoptysis; No shortness of breath  CARDIOVASCULAR: No chest pain, palpitations, dizziness, or leg swelling  GASTROINTESTINAL: No abdominal or epigastric pain. No nausea, vomiting, or hematemesis; No diarrhea or constipation. No melena or hematochezia.  GENITOURINARY: No dysuria, frequency, hematuria, or incontinence  NEUROLOGICAL: No headaches, memory loss, loss of strength, numbness, or tremors  SKIN: No itching, burning, rashes, or lesions   MUSCULOSKELETAL: No joint pain or swelling; No muscle, back, or extremity pain  PSYCHIATRIC: No depression, anxiety, mood swings, or difficulty sleeping      Medications:  DOPamine Infusion 9.985 MICROgram(s)/kG/Min IV Continuous <Continuous>  acetaminophen     Tablet .. 650 milliGRAM(s) Oral every 6 hours PRN  ondansetron Injectable 4 milliGRAM(s) IV Push every 6 hours PRN  aspirin enteric coated 81 milliGRAM(s) Oral daily  clopidogrel Tablet 75 milliGRAM(s) Oral daily  atorvastatin 20 milliGRAM(s) Oral at bedtime  dextrose 40% Gel 15 Gram(s) Oral once  dextrose 50% Injectable 25 Gram(s) IV Push once  dextrose 50% Injectable 12.5 Gram(s) IV Push once  dextrose 50% Injectable 25 Gram(s) IV Push once  glucagon  Injectable 1 milliGRAM(s) IntraMuscular once  insulin glargine Injectable (LANTUS) 15 Unit(s) SubCutaneous every morning  insulin lispro (ADMELOG) corrective regimen sliding scale   SubCutaneous three times a day before meals  insulin lispro (ADMELOG) corrective regimen sliding scale   SubCutaneous at bedtime  dextrose 5%. 1000 milliLiter(s) IV Continuous <Continuous>  dextrose 5%. 1000 milliLiter(s) IV Continuous <Continuous>  multivitamin/minerals 1 Tablet(s) Oral daily  sodium chloride 0.9%. 1000 milliLiter(s) IV Continuous <Continuous>  chlorhexidine 4% Liquid 1 Application(s) Topical once        ICU Vital Signs Last 24 Hrs  T(C): 36.5 (12 Feb 2022 01:30), Max: 37.2 (11 Feb 2022 07:58)  T(F): 97.7 (12 Feb 2022 01:30), Max: 99 (11 Feb 2022 07:58)  HR: 50 (12 Feb 2022 02:00) (48 - 86)  BP: 131/55 (12 Feb 2022 02:00) (91/48 - 161/66)  BP(mean): 77 (12 Feb 2022 02:00) (58 - 92)  RR: 15 (12 Feb 2022 02:00) (14 - 18)  SpO2: 95% (12 Feb 2022 02:00) (90% - 100%)          I&O's Detail    10 Feb 2022 07:01  -  11 Feb 2022 07:00  --------------------------------------------------------  IN:    DOPamine Infusion: 442.9 mL    Oral Fluid: 120 mL    sodium chloride 0.9%: 1200 mL  Total IN: 1762.9 mL    OUT:    Urostomy (mL): 1100 mL    Urostomy (mL): 600 mL    Voided (mL): 600 mL  Total OUT: 2300 mL    Total NET: -537.1 mL      11 Feb 2022 07:01  -  12 Feb 2022 02:42  --------------------------------------------------------  IN:    DOPamine Infusion: 416.6 mL    DOPamine Infusion: 103.2 mL    IV PiggyBack: 50 mL    Lactated Ringers Bolus: 1000 mL    Oral Fluid: 820 mL    sodium chloride 0.9%: 1700 mL  Total IN: 4089.8 mL    OUT:    Urostomy (mL): 3000 mL  Total OUT: 3000 mL    Total NET: 1089.8 mL            LABS:                        11.1   12.09 )-----------( 248      ( 11 Feb 2022 07:03 )             33.5     02-11    141  |  109<H>  |  17.9  ----------------------------<  195<H>  4.1   |  20.0<L>  |  0.91    Ca    8.2<L>      11 Feb 2022 15:50  Phos  3.7     02-11  Mg     1.7     02-11    TPro  6.7  /  Alb  4.1  /  TBili  0.4  /  DBili  x   /  AST  17  /  ALT  15  /  AlkPhos  82  02-10          CAPILLARY BLOOD GLUCOSE      POCT Blood Glucose.: 167 mg/dL (11 Feb 2022 21:31)    PT/INR - ( 10 Feb 2022 08:46 )   PT: 11.7 sec;   INR: 1.01 ratio         PTT - ( 10 Feb 2022 08:46 )  PTT:29.1 sec    CULTURES:      Physical Examination:    General:  Awake.  Alert, oriented, interactive, nonfocal    HEENT: Pupils equal, reactive to light.  Symmetric. No JVD.    PULM: Clear to auscultation bilaterally, no significant sputum production    CVS: Regular rate and rhythm, no murmurs, rubs, or gallops    ABD: Soft, nondistended, nontender, normoactive bowel sounds, no masses    EXT: No edema, nontender    SKIN: Warm and well perfused, no rashes noted.              CRITICAL CARE TIME SPENT: 35 minutes  (Assessing presenting problems of acute illness, which pose high probability of life threatening deterioration or end organ damage/dysfunction, as well as medical decision making including initiating plan of care, reviewing data, reviewing radiologic exams, discussing with multidisciplinary team,  discussing goals of care with patient/family, and writing this note.  Non-inclusive of procedures performed)

## 2022-02-12 NOTE — PROGRESS NOTE ADULT - ASSESSMENT
71 year old female PMHx B/L Carotid Dz (L severe, high grade. R 40-50%), DM2, HTN, HLD, Bladder Ca ( s/p Illioconduit / Uropstomy).  Admitted 2/10/2022 for elective L CEA, Stent X 1 with Post-Op Bradycardia and Hypotension     Post-Op CEA with Stent Right   Bradycardia resolved   Hypotensive still likely both secondary to Master receptor stim      ASA / Plavix / Statin  Dopamine gtt for BP support actively titrating down -  Keep MAP > 65   Diet as tolerates   Lispro by SS   No Infective concerns

## 2022-02-13 ENCOUNTER — TRANSCRIPTION ENCOUNTER (OUTPATIENT)
Age: 72
End: 2022-02-13

## 2022-02-13 VITALS — DIASTOLIC BLOOD PRESSURE: 66 MMHG | OXYGEN SATURATION: 99 % | HEART RATE: 66 BPM | SYSTOLIC BLOOD PRESSURE: 128 MMHG

## 2022-02-13 LAB
ANION GAP SERPL CALC-SCNC: 14 MMOL/L — SIGNIFICANT CHANGE UP (ref 5–17)
BUN SERPL-MCNC: 14.7 MG/DL — SIGNIFICANT CHANGE UP (ref 8–20)
CALCIUM SERPL-MCNC: 8.1 MG/DL — LOW (ref 8.6–10.2)
CHLORIDE SERPL-SCNC: 106 MMOL/L — SIGNIFICANT CHANGE UP (ref 98–107)
CO2 SERPL-SCNC: 20 MMOL/L — LOW (ref 22–29)
CREAT SERPL-MCNC: 0.8 MG/DL — SIGNIFICANT CHANGE UP (ref 0.5–1.3)
GLUCOSE BLDC GLUCOMTR-MCNC: 116 MG/DL — HIGH (ref 70–99)
GLUCOSE BLDC GLUCOMTR-MCNC: 176 MG/DL — HIGH (ref 70–99)
GLUCOSE SERPL-MCNC: 97 MG/DL — SIGNIFICANT CHANGE UP (ref 70–99)
HCT VFR BLD CALC: 27.5 % — LOW (ref 34.5–45)
HGB BLD-MCNC: 9.1 G/DL — LOW (ref 11.5–15.5)
MAGNESIUM SERPL-MCNC: 1.9 MG/DL — SIGNIFICANT CHANGE UP (ref 1.6–2.6)
MCHC RBC-ENTMCNC: 30.7 PG — SIGNIFICANT CHANGE UP (ref 27–34)
MCHC RBC-ENTMCNC: 33.1 GM/DL — SIGNIFICANT CHANGE UP (ref 32–36)
MCV RBC AUTO: 92.9 FL — SIGNIFICANT CHANGE UP (ref 80–100)
PHOSPHATE SERPL-MCNC: 3.3 MG/DL — SIGNIFICANT CHANGE UP (ref 2.4–4.7)
PLATELET # BLD AUTO: 190 K/UL — SIGNIFICANT CHANGE UP (ref 150–400)
POTASSIUM SERPL-MCNC: 3.7 MMOL/L — SIGNIFICANT CHANGE UP (ref 3.5–5.3)
POTASSIUM SERPL-SCNC: 3.7 MMOL/L — SIGNIFICANT CHANGE UP (ref 3.5–5.3)
RBC # BLD: 2.96 M/UL — LOW (ref 3.8–5.2)
RBC # FLD: 12.7 % — SIGNIFICANT CHANGE UP (ref 10.3–14.5)
SODIUM SERPL-SCNC: 140 MMOL/L — SIGNIFICANT CHANGE UP (ref 135–145)
WBC # BLD: 5.82 K/UL — SIGNIFICANT CHANGE UP (ref 3.8–10.5)
WBC # FLD AUTO: 5.82 K/UL — SIGNIFICANT CHANGE UP (ref 3.8–10.5)

## 2022-02-13 PROCEDURE — 83605 ASSAY OF LACTIC ACID: CPT

## 2022-02-13 PROCEDURE — C1884: CPT

## 2022-02-13 PROCEDURE — 85027 COMPLETE CBC AUTOMATED: CPT

## 2022-02-13 PROCEDURE — C1725: CPT

## 2022-02-13 PROCEDURE — 83735 ASSAY OF MAGNESIUM: CPT

## 2022-02-13 PROCEDURE — C1769: CPT

## 2022-02-13 PROCEDURE — 84100 ASSAY OF PHOSPHORUS: CPT

## 2022-02-13 PROCEDURE — 99232 SBSQ HOSP IP/OBS MODERATE 35: CPT

## 2022-02-13 PROCEDURE — 83036 HEMOGLOBIN GLYCOSYLATED A1C: CPT

## 2022-02-13 PROCEDURE — C1887: CPT

## 2022-02-13 PROCEDURE — 80053 COMPREHEN METABOLIC PANEL: CPT

## 2022-02-13 PROCEDURE — 36415 COLL VENOUS BLD VENIPUNCTURE: CPT

## 2022-02-13 PROCEDURE — 85730 THROMBOPLASTIN TIME PARTIAL: CPT

## 2022-02-13 PROCEDURE — 82962 GLUCOSE BLOOD TEST: CPT

## 2022-02-13 PROCEDURE — C8929: CPT

## 2022-02-13 PROCEDURE — 82533 TOTAL CORTISOL: CPT

## 2022-02-13 PROCEDURE — 37215 TRANSCATH STENT CCA W/EPS: CPT

## 2022-02-13 PROCEDURE — 84443 ASSAY THYROID STIM HORMONE: CPT

## 2022-02-13 PROCEDURE — 80048 BASIC METABOLIC PNL TOTAL CA: CPT

## 2022-02-13 PROCEDURE — C1760: CPT

## 2022-02-13 PROCEDURE — 93005 ELECTROCARDIOGRAM TRACING: CPT

## 2022-02-13 PROCEDURE — C1876: CPT

## 2022-02-13 PROCEDURE — 85610 PROTHROMBIN TIME: CPT

## 2022-02-13 PROCEDURE — 99239 HOSP IP/OBS DSCHRG MGMT >30: CPT

## 2022-02-13 PROCEDURE — 85025 COMPLETE CBC W/AUTO DIFF WBC: CPT

## 2022-02-13 PROCEDURE — C1894: CPT

## 2022-02-13 RX ORDER — IRBESARTAN 75 MG/1
1 TABLET ORAL
Qty: 0 | Refills: 0 | DISCHARGE

## 2022-02-13 RX ORDER — CLOPIDOGREL BISULFATE 75 MG/1
0 TABLET, FILM COATED ORAL
Qty: 0 | Refills: 0 | DISCHARGE

## 2022-02-13 RX ADMIN — INSULIN GLARGINE 15 UNIT(S): 100 INJECTION, SOLUTION SUBCUTANEOUS at 08:50

## 2022-02-13 RX ADMIN — CHLORHEXIDINE GLUCONATE 1 APPLICATION(S): 213 SOLUTION TOPICAL at 07:10

## 2022-02-13 RX ADMIN — Medication 1 TABLET(S): at 11:38

## 2022-02-13 RX ADMIN — ENOXAPARIN SODIUM 40 MILLIGRAM(S): 100 INJECTION SUBCUTANEOUS at 11:36

## 2022-02-13 RX ADMIN — CLOPIDOGREL BISULFATE 75 MILLIGRAM(S): 75 TABLET, FILM COATED ORAL at 11:37

## 2022-02-13 RX ADMIN — Medication 81 MILLIGRAM(S): at 11:38

## 2022-02-13 RX ADMIN — Medication 1: at 11:29

## 2022-02-13 NOTE — PROGRESS NOTE ADULT - SUBJECTIVE AND OBJECTIVE BOX
Acceptance  Note  Chart reviewed   pt is seen examined       Hospital Course     71 year old female PMHx B/L Carotid Dz (L severe, high grade. R 40-50%), DM2, HTN, HLD, Bladder Ca ( s/p Illioconduit / Uropstomy).  Admitted 2/10/2022 for elective L CEA, Stent X 1 with Post-Op Bradycardia and Hypotension requiring pressor support          ALLERGIES:  Levaquin (Unknown)  Originally Entered as [Unknown] reaction to [famciclovir] (Unknown)  sulfa drugs (Unknown)    MEDICATIONS  (STANDING):  aspirin enteric coated 81 milliGRAM(s) Oral daily  atorvastatin 20 milliGRAM(s) Oral at bedtime  clopidogrel Tablet 75 milliGRAM(s) Oral daily  dextrose 50% Injectable 25 Gram(s) IV Push once  dextrose 50% Injectable 12.5 Gram(s) IV Push once  dextrose 50% Injectable 25 Gram(s) IV Push once  enoxaparin Injectable 40 milliGRAM(s) SubCutaneous daily  glucagon  Injectable 1 milliGRAM(s) IntraMuscular once  insulin glargine Injectable (LANTUS) 15 Unit(s) SubCutaneous every morning  insulin lispro (ADMELOG) corrective regimen sliding scale   SubCutaneous three times a day before meals  insulin lispro (ADMELOG) corrective regimen sliding scale   SubCutaneous at bedtime  multivitamin/minerals 1 Tablet(s) Oral daily    MEDICATIONS  (PRN):  acetaminophen     Tablet .. 650 milliGRAM(s) Oral every 6 hours PRN Mild Pain (1 - 3)  ondansetron Injectable 4 milliGRAM(s) IV Push every 6 hours PRN Nausea and/or Vomiting    Vital Signs Last 24 Hrs  T(F): 98.1 (13 Feb 2022 07:21), Max: 98.2 (12 Feb 2022 10:42)  HR: 50 (13 Feb 2022 07:00) (48 - 78)  BP: 125/66 (13 Feb 2022 07:00) (84/73 - 136/65)  RR: 16 (13 Feb 2022 06:00) (16 - 21)  SpO2: 99% (13 Feb 2022 07:00) (92% - 100%)  I&O's Summary    12 Feb 2022 07:01  -  13 Feb 2022 07:00  --------------------------------------------------------  IN: 1145.8 mL / OUT: 5400 mL / NET: -4254.2 mL        PHYSICAL EXAM:  General:   ENT:   Neck:   Lungs:   Cardio: RRR, S1/S2, No murmur  Abdomen: Soft, Nontender, Nondistended; Bowel sounds present  Extremities: No calf tenderness, No pitting edema    LABS:                        9.1    5.82  )-----------( 190      ( 13 Feb 2022 04:34 )             27.5     02-13    140  |  106  |  14.7  ----------------------------<  97  3.7   |  20.0  |  0.80    Ca    8.1      13 Feb 2022 04:34  Phos  3.3     02-13  Mg     1.9     02-13    TPro  6.7  /  Alb  4.1  /  TBili  0.4  /  DBili  x   /  AST  17  /  ALT  15  /  AlkPhos  82  02-10        eGFR if African American: 87 mL/min/1.73M2 (02-13-22 @ 04:34)  eGFR if Non African American: 75 mL/min/1.73M2 (02-13-22 @ 04:34)    PT/INR - ( 10 Feb 2022 08:46 )   PT: 11.7 sec;   INR: 1.01 ratio         PTT - ( 10 Feb 2022 08:46 )  PTT:29.1 sec  Lactate, Blood: 1.0 mmol/L (02-10 @ 20:34)            TSH 0.22   TSH with FT4 reflex --  Total T3 --              POCT Blood Glucose.: 116 mg/dL (13 Feb 2022 07:46)  POCT Blood Glucose.: 173 mg/dL (12 Feb 2022 21:29)  POCT Blood Glucose.: 127 mg/dL (12 Feb 2022 16:23)  POCT Blood Glucose.: 158 mg/dL (12 Feb 2022 11:35)            RADIOLOGY & ADDITIONAL TESTS:       Acceptance  Note  Chart reviewed   pt is seen examined     she is feeling well , no overnight events   d/w cardiology team at the bedside   pt wants to go home      Hospital Course     71 year old female PMHx B/L Carotid Dz (L severe, high grade. R 40-50%), DM2, HTN, HLD, Bladder Ca ( s/p Illioconduit / Uropstomy).  Admitted 2/10/2022 for elective L CEA, Stent X 1 with Post-Op Bradycardia and Hypotension requiring pressor support          ALLERGIES:  Levaquin (Unknown)  Originally Entered as [Unknown] reaction to [famciclovir] (Unknown)  sulfa drugs (Unknown)    MEDICATIONS  (STANDING):  aspirin enteric coated 81 milliGRAM(s) Oral daily  atorvastatin 20 milliGRAM(s) Oral at bedtime  clopidogrel Tablet 75 milliGRAM(s) Oral daily  dextrose 50% Injectable 25 Gram(s) IV Push once  dextrose 50% Injectable 12.5 Gram(s) IV Push once  dextrose 50% Injectable 25 Gram(s) IV Push once  enoxaparin Injectable 40 milliGRAM(s) SubCutaneous daily  glucagon  Injectable 1 milliGRAM(s) IntraMuscular once  insulin glargine Injectable (LANTUS) 15 Unit(s) SubCutaneous every morning  insulin lispro (ADMELOG) corrective regimen sliding scale   SubCutaneous three times a day before meals  insulin lispro (ADMELOG) corrective regimen sliding scale   SubCutaneous at bedtime  multivitamin/minerals 1 Tablet(s) Oral daily    MEDICATIONS  (PRN):  acetaminophen     Tablet .. 650 milliGRAM(s) Oral every 6 hours PRN Mild Pain (1 - 3)  ondansetron Injectable 4 milliGRAM(s) IV Push every 6 hours PRN Nausea and/or Vomiting    Vital Signs Last 24 Hrs  T(F): 98.1 (13 Feb 2022 07:21), Max: 98.2 (12 Feb 2022 10:42)  HR: 50 (13 Feb 2022 07:00) (48 - 78)  BP: 125/66 (13 Feb 2022 07:00) (84/73 - 136/65)  RR: 16 (13 Feb 2022 06:00) (16 - 21)  SpO2: 99% (13 Feb 2022 07:00) (92% - 100%)  I&O's Summary    12 Feb 2022 07:01  -  13 Feb 2022 07:00  --------------------------------------------------------  IN: 1145.8 mL / OUT: 5400 mL / NET: -4254.2 mL        PHYSICAL EXAM:  General: awake alert , no distress   Neck: supple, no JVd   Lungs: CTa bilateral   Cardio: RRR, S1/S2, No murmur  Abdomen: Soft, Nontender, Nondistended; Bowel sounds present  Extremities: No calf tenderness, No pitting edema  Left groin skin bruise , mild tenderness       LABS:                        9.1    5.82  )-----------( 190      ( 13 Feb 2022 04:34 )             27.5     02-13    140  |  106  |  14.7  ----------------------------<  97  3.7   |  20.0  |  0.80    Ca    8.1      13 Feb 2022 04:34  Phos  3.3     02-13  Mg     1.9     02-13    TPro  6.7  /  Alb  4.1  /  TBili  0.4  /  DBili  x   /  AST  17  /  ALT  15  /  AlkPhos  82  02-10        eGFR if African American: 87 mL/min/1.73M2 (02-13-22 @ 04:34)  eGFR if Non African American: 75 mL/min/1.73M2 (02-13-22 @ 04:34)    PT/INR - ( 10 Feb 2022 08:46 )   PT: 11.7 sec;   INR: 1.01 ratio         PTT - ( 10 Feb 2022 08:46 )  PTT:29.1 sec  Lactate, Blood: 1.0 mmol/L (02-10 @ 20:34)            TSH 0.22   TSH with FT4 reflex --  Total T3 --              POCT Blood Glucose.: 116 mg/dL (13 Feb 2022 07:46)  POCT Blood Glucose.: 173 mg/dL (12 Feb 2022 21:29)  POCT Blood Glucose.: 127 mg/dL (12 Feb 2022 16:23)  POCT Blood Glucose.: 158 mg/dL (12 Feb 2022 11:35)            RADIOLOGY & ADDITIONAL TESTS:

## 2022-02-13 NOTE — DISCHARGE NOTE NURSING/CASE MANAGEMENT/SOCIAL WORK - NSDCPEFALRISK_GEN_ALL_CORE
For information on Fall & Injury Prevention, visit: [email or web address] OR  [email or web address] OR  https://www.cdc.gov/steadi/patient.html

## 2022-02-13 NOTE — PROGRESS NOTE ADULT - SUBJECTIVE AND OBJECTIVE BOX
[facility]                                                         CARDIOLOGY AT [address], [place] [address]                                                         Telephone: [phone]. Fax:[phone]                                                                             PROGRESS NOTE    Reason for follow up: Carotid Stenting  Update: Patient's BP and HR have been stable off dopamine. Patient feeling well and wants to go home today.       Review of symptoms:   Cardiac:  No chest pain. No dyspnea. No palpitations.  Respiratory: no cough. No dyspnea  Gastrointestinal: No diarrhea. No abdominal pain. No bleeding.   Neuro: No focal neuro complaints.      Vitals:  T(C): 36.7 (02-13-22 @ 07:21), Max: 36.8 (02-12-22 @ 10:42)  HR: 65 (02-13-22 @ 08:00) (48 - 78)  BP: 121/68 (02-13-22 @ 08:00) (84/73 - 136/65)  RR: 16 (02-13-22 @ 06:00) (16 - 21)  SpO2: 99% (02-13-22 @ 08:00) (92% - 100%)  Wt(kg): --  I&O's Summary    12 Feb 2022 07:01  -  13 Feb 2022 07:00  --------------------------------------------------------  IN: 1145.8 mL / OUT: 5400 mL / NET: -4254.2 mL      Weight (kg): 68.9 (02-10 @ 08:41)      PHYSICAL EXAM:  Appearance: Comfortable. No acute distress  HEENT:  Head and neck: Atraumatic. Normocephalic.  Normal oral mucosa, PERRL, Neck is supple. No JVD, No carotid bruit.   Neurologic: A&Ox 3, no focal deficits. EOMI, Cranial nerves are intact.  Lymphatic: No cervical lymphadenopathy  Cardiovascular: Normal S1 S2, No murmur, rubs/gallops. No JVD, No edema  Respiratory: Lungs clear to auscultation  Gastrointestinal:  Soft, Non-tender, + BS  Lower Extremities: No edema, left groin soft, nontender, some ecchymosis   Psychiatry: Patient is calm. No agitation. Mood & affect appropriate  Skin: No rashes/ecchymoses/cyanosis/ulcers visualized on the face, hands or feet.    CURRENT MEDICATIONS:    atorvastatin  dextrose 50% Injectable  dextrose 50% Injectable  dextrose 50% Injectable  glucagon  Injectable  insulin glargine Injectable (LANTUS)  insulin lispro (ADMELOG) corrective regimen sliding scale  insulin lispro (ADMELOG) corrective regimen sliding scale  aspirin enteric coated  clopidogrel Tablet  enoxaparin Injectable  multivitamin/minerals        LABS:	 	                            9.1    5.82  )-----------( 190      ( 13 Feb 2022 04:34 )             27.5     02-13    140  |  106  |  14.7  ----------------------------<  97  3.7   |  20.0<L>  |  0.80    Ca    8.1<L>      13 Feb 2022 04:34  Phos  3.3     02-13  Mg     1.9     02-13      proBNP:   Lipid Profile:   HgA1c:   TSH: Thyroid Stimulating Hormone, Serum: 0.22 uIU/mL        TELEMETRY: SR        DIAGNOSTIC TESTING:  [ ] Echocardiogram:   < from: TTE Echo Complete w/ Contrast w/ Doppler (02.12.22 @ 11:06) >    PHYSICIAN INTERPRETATION:  Left Ventricle: The left ventricular internal cavity size is normal.  Left ventricular ejection fraction, by visual estimation, is 60 to 65%.   Spectral Doppler shows pseudonormal pattern of left ventricular   myocardial filling (Grade II diastolic dysfunction).  Right Ventricle: Normal right ventricular size and function. TV S' 0.1   m/s.  Left Atrium: The left atrium is normal in size.  Right Atrium: The right atrium is normal in size.  Pericardium: There is no evidence of pericardial effusion.  Mitral Valve: No evidence of mitral valve regurgitation is seen.  Tricuspid Valve: Trivial tricuspid regurgitation is visualized.  Aortic Valve: Peak transaortic gradient equals 8.1 mmHg, mean transaortic   gradient equals 4.3 mmHg, the calculated aortic valve area equals 2.74   cm² by the continuity equation consistent with normally opening aortic   valve. No evidence of aortic valve regurgitation is seen.  Pulmonic Valve: The pulmonic valve was not well visualized. No indication   of pulmonic valve regurgitation.  Aorta: The aortic root is normal in size and structure.  Venous: The inferior vena cava was normal sized, with respiratory size   variation greater than 50%.  In comparison to the previous echocardiogram(s): There are no prior   studies on this patient for comparison purposes. No prior studies are   available for comparison.      Summary:   1. Left ventricular ejection fraction, by visual estimation, is 60 to   65%.   2. Normal right ventricular size and function.   3. Spectral Doppler shows pseudonormal pattern of left ventricular   myocardial filling (Grade II diastolic dysfunction).   4. No significant valvular disease.   5. No prior studies are available for comparison.    MECHELLE Travis Electronically signed on 2/12/2022 at 8:55:36 PM        < end of copied text >      OTHER:

## 2022-02-13 NOTE — PROGRESS NOTE ADULT - SUBJECTIVE AND OBJECTIVE BOX
Patient is a 71y old  Female who presents with a chief complaint of Left Carotid Angioplasty (12 Feb 2022 08:56)      BRIEF HOSPITAL COURSE: 71 year old female PMHx B/L Carotid Dz (L severe, high grade. R 40-50%), DM2, HTN, HLD, Bladder Ca ( s/p Illioconduit / Uropstomy).  Admitted 2/10/2022 for elective L CEA, Stent X 1 with Post-Op Bradycardia and Hypotension requiring pressor support.       Events last 24 hours: Remains off pressors overnight.      PAST MEDICAL & SURGICAL HISTORY:  DM (diabetes mellitus)  Bladder cancer  Has ilial conduit-urostomy- drainage bag  Hyperlipidemia  Hypertension        Review of Systems:  CONSTITUTIONAL: No fever, chills, or fatigue  EYES: No eye pain, visual disturbances, or discharge  ENMT:  No difficulty hearing, tinnitus, vertigo; No sinus or throat pain  NECK: No pain or stiffness  RESPIRATORY: No cough, wheezing, chills or hemoptysis; No shortness of breath  CARDIOVASCULAR: No chest pain, palpitations, dizziness, or leg swelling  GASTROINTESTINAL: No abdominal or epigastric pain. No nausea, vomiting, or hematemesis; No diarrhea or constipation. No melena or hematochezia.  GENITOURINARY: No dysuria, frequency, hematuria, or incontinence  NEUROLOGICAL: No headaches, memory loss, loss of strength, numbness, or tremors  SKIN: No itching, burning, rashes, or lesions   MUSCULOSKELETAL: No joint pain or swelling; No muscle, back, or extremity pain  PSYCHIATRIC: No depression, anxiety, mood swings, or difficulty sleeping      Medications:  acetaminophen     Tablet .. 650 milliGRAM(s) Oral every 6 hours PRN  ondansetron Injectable 4 milliGRAM(s) IV Push every 6 hours PRN  aspirin enteric coated 81 milliGRAM(s) Oral daily  clopidogrel Tablet 75 milliGRAM(s) Oral daily  enoxaparin Injectable 40 milliGRAM(s) SubCutaneous daily  atorvastatin 20 milliGRAM(s) Oral at bedtime  dextrose 50% Injectable 25 Gram(s) IV Push once  dextrose 50% Injectable 12.5 Gram(s) IV Push once  dextrose 50% Injectable 25 Gram(s) IV Push once  glucagon  Injectable 1 milliGRAM(s) IntraMuscular once  insulin glargine Injectable (LANTUS) 15 Unit(s) SubCutaneous every morning  insulin lispro (ADMELOG) corrective regimen sliding scale   SubCutaneous three times a day before meals  insulin lispro (ADMELOG) corrective regimen sliding scale   SubCutaneous at bedtime  multivitamin/minerals 1 Tablet(s) Oral daily  chlorhexidine 4% Liquid 1 Application(s) Topical once        ICU Vital Signs Last 24 Hrs  T(C): 36.7 (12 Feb 2022 23:37), Max: 36.9 (12 Feb 2022 07:29)  T(F): 98.1 (12 Feb 2022 23:37), Max: 98.5 (12 Feb 2022 07:29)  HR: 50 (13 Feb 2022 03:00) (50 - 78)  BP: 109/54 (13 Feb 2022 03:00) (84/73 - 140/61)  BP(mean): 72 (13 Feb 2022 03:00) (66 - 85)  RR: 16 (13 Feb 2022 03:00) (16 - 21)  SpO2: 98% (13 Feb 2022 03:00) (93% - 100%)          I&O's Detail    11 Feb 2022 07:01  -  12 Feb 2022 07:00  --------------------------------------------------------  IN:    DOPamine Infusion: 196 mL    DOPamine Infusion: 416.6 mL    IV PiggyBack: 50 mL    Lactated Ringers Bolus: 1000 mL    Oral Fluid: 820 mL    sodium chloride 0.9%: 2300 mL  Total IN: 4782.6 mL    OUT:    Urostomy (mL): 4200 mL  Total OUT: 4200 mL    Total NET: 582.6 mL      12 Feb 2022 07:01  -  13 Feb 2022 03:52  --------------------------------------------------------  IN:    DOPamine Infusion: 25.8 mL    Oral Fluid: 320 mL    sodium chloride 0.9%: 200 mL  Total IN: 545.8 mL    OUT:    Urostomy (mL): 3800 mL  Total OUT: 3800 mL    Total NET: -3254.2 mL            LABS:                        9.8    6.80  )-----------( 215      ( 12 Feb 2022 05:26 )             30.3     02-12    140  |  109<H>  |  14.9  ----------------------------<  183<H>  3.9   |  20.0<L>  |  0.83    Ca    8.1<L>      12 Feb 2022 05:26  Phos  2.4     02-12  Mg     1.9     02-12            CAPILLARY BLOOD GLUCOSE      POCT Blood Glucose.: 173 mg/dL (12 Feb 2022 21:29)        CULTURES:      Physical Examination:    General:  Awake.  Alert, oriented, interactive, nonfocal    HEENT: Pupils equal, reactive to light.  Symmetric. No JVD.    PULM: Clear to auscultation bilaterally, no significant sputum production    CVS: Regular rate and rhythm, no murmurs, rubs, or gallops    ABD: Soft, nondistended, nontender, normoactive bowel sounds, no masses    EXT: No edema, nontender    SKIN: Warm and well perfused, no rashes noted.            CRITICAL CARE TIME SPENT: 38  (Assessing presenting problems of acute illness, which pose high probability of life threatening deterioration or end organ damage/dysfunction, as well as medical decision making including initiating plan of care, reviewing data, reviewing radiologic exams, discussing with multidisciplinary team,  discussing goals of care with patient/family, and writing this note.  Non-inclusive of procedures performed)

## 2022-02-13 NOTE — DISCHARGE NOTE NURSING/CASE MANAGEMENT/SOCIAL WORK - PATIENT PORTAL LINK FT
You can access the FollowMyHealth Patient Portal offered by [facility] by registering at the following website: [email or web address]. By joining [facility]’s FollowMyHealth portal, you will also be able to view your health information using other applications (apps) compatible with our system.

## 2022-02-13 NOTE — PROGRESS NOTE ADULT - ASSESSMENT
A/P: 71 year old female with a PMH of bilateral carotid artery disease ( left severe, high grade.  Right-40-50%, Type 2 DM, HTN, HLD, Bladder CA -presence of urostomy, presenting to cath holding area this am  (NORMAL EF AND NEGATIVE STRESS TEST LAST 6 MONTHS)  for a Carotid Angioplasty with Dr GUILLERMINA Gant. Pt had vascular consultation with Dr Jairo Salinas - refusing CEA surgery because of general anesthesia issues in the past. -Pt denies any c/o CP, SOB, palpitations, dizziness, lightheadedness or neurologic deficits, and is in no acute distress. Pt REIS without any deficits. PERRLA.   s/p left carotid stent ( Xact 9.7 [id number] via #6Fr LFA ( now with Perclose closure agent) by Dr Delia Gant     Carotid Stenosis s/p Left Carotid Stent  - Continue aspirin, plavix, and atorvastatin.   - F/u with Dr. Gant after discharge.     Hypotension  - Weaned off dopamine.   - Echocardiogram with normal EF, no acute findings.   - Hold home Avapro 300mg until patient f/u with Dr. Gant.     Paroxysmal Atrial Fibrillation   - No further events.   - Afib with RVR but while on dopamine.   - [id number] score 4 (HTN, DMII, age, female).   -  Due to short episode of Afib in setting of dopamine, would defer starting AC at this time. Patient to have MCOT monitoring as an outpatient to evaluate for Afib burden. Continue aspirin and plavix at this time.      Assessment and recommendations are final when note is signed by the attending.

## 2022-02-13 NOTE — PROGRESS NOTE ADULT - ASSESSMENT
71 year old female with a PMH of bilateral carotid artery disease ( left severe, high grade.  Right-40-50%, Type 2 DM, HTN, HLD, Bladder CA -presence of urostomy, presented for left   for a Carotid Angioplasty with Dr GUILLERMINA Gant. Pt had Post-Op Bradycardia and Hypotension requiring pressor support. Now improved       1- Post procedure Hypotension / bardycardia   improved   off dopamine infusion for BP support     2- Paroxsymal  atrial fib whiie on dopamine resolved   to follow up with cardiology for MCOT monitoring as an outpatient to evaluate for Afib burden  no AC per cardiology     3- Left Carotid artery disease   s/p carotid angioplasty   cont aspirin plavix   follow up with Dr Gant     4- H/o HTN   on avapro   BP is soft normal will hold starting BP meds   follow up with cardio in the office next week     5- Diabetes Mellitus type 2   BG is controlled   cont diet ISS     6- h/o bladder cancer   Has ilial conduit-urostomy- drainage bag    possible discharge home soon     ambulate OOB

## 2022-02-13 NOTE — PROGRESS NOTE ADULT - ATTENDING COMMENTS
Patient was discharged before being seen by the attending
72 yo female with severe left sided carotid stenosis, DM2, HTN, HLD, Bladder Ca, underwent carotid stent placement on 2/10, transferred to MICU afterwards due to bradycardia and hypotension.  Currently being weaned off dopamine, now down to 4 mcg.
Bradycardia and hypotension.   Vagal mediated response.   Plan for continued dopamine infusion. Aggressive IV fluids.   Continue aspirin and plavix.

## 2022-02-13 NOTE — PROGRESS NOTE ADULT - ASSESSMENT
71 year old female PMHx B/L Carotid Dz (L severe, high grade. R 40-50%), DM2, HTN, HLD, Bladder Ca ( s/p Illioconduit / Uropstomy).  Admitted 2/10/2022 for elective L CEA, Stent X 1 with Post-Op Bradycardia and Hypotension     Post-Op CEA with Stent Right   Bradycardia resolved   Hypotension resolved       ASA / Plavix / Statin  Keep MAP > 65   Diet as tolerates   Lispro by SS   No Infective concerns   Stable for downgrade

## 2022-09-16 ENCOUNTER — INPATIENT (INPATIENT)
Facility: HOSPITAL | Age: 72
LOS: 2 days | Discharge: HOME | DRG: 699 | End: 2022-09-19
Attending: HOSPITALIST | Admitting: INTERNAL MEDICINE
Payer: MEDICARE

## 2022-09-16 VITALS
WEIGHT: 153 LBS | DIASTOLIC BLOOD PRESSURE: 56 MMHG | SYSTOLIC BLOOD PRESSURE: 127 MMHG | OXYGEN SATURATION: 100 % | HEART RATE: 79 BPM | TEMPERATURE: 99 F | RESPIRATION RATE: 18 BRPM | HEIGHT: 63 IN

## 2022-09-16 DIAGNOSIS — N12 TUBULO-INTERSTITIAL NEPHRITIS, NOT SPECIFIED AS ACUTE OR CHRONIC: ICD-10-CM

## 2022-09-16 LAB
ALBUMIN SERPL ELPH-MCNC: 3.6 G/DL — SIGNIFICANT CHANGE UP (ref 3.3–5)
ALP SERPL-CCNC: 84 U/L — SIGNIFICANT CHANGE UP (ref 40–120)
ALT FLD-CCNC: 28 U/L — SIGNIFICANT CHANGE UP (ref 12–78)
ANION GAP SERPL CALC-SCNC: 7 MMOL/L — SIGNIFICANT CHANGE UP (ref 5–17)
APPEARANCE UR: CLEAR — SIGNIFICANT CHANGE UP
APTT BLD: 28.6 SEC — SIGNIFICANT CHANGE UP (ref 27.5–35.5)
AST SERPL-CCNC: 14 U/L — LOW (ref 15–37)
BASOPHILS # BLD AUTO: 0.01 K/UL — SIGNIFICANT CHANGE UP (ref 0–0.2)
BASOPHILS NFR BLD AUTO: 0.1 % — SIGNIFICANT CHANGE UP (ref 0–2)
BILIRUB SERPL-MCNC: 0.5 MG/DL — SIGNIFICANT CHANGE UP (ref 0.2–1.2)
BILIRUB UR-MCNC: NEGATIVE — SIGNIFICANT CHANGE UP
BUN SERPL-MCNC: 28 MG/DL — HIGH (ref 7–23)
CALCIUM SERPL-MCNC: 8.9 MG/DL — SIGNIFICANT CHANGE UP (ref 8.5–10.1)
CHLORIDE SERPL-SCNC: 102 MMOL/L — SIGNIFICANT CHANGE UP (ref 96–108)
CO2 SERPL-SCNC: 26 MMOL/L — SIGNIFICANT CHANGE UP (ref 22–31)
COLOR SPEC: YELLOW — SIGNIFICANT CHANGE UP
CREAT SERPL-MCNC: 1.52 MG/DL — HIGH (ref 0.5–1.3)
DIFF PNL FLD: ABNORMAL
EGFR: 37 ML/MIN/1.73M2 — LOW
EOSINOPHIL # BLD AUTO: 0.01 K/UL — SIGNIFICANT CHANGE UP (ref 0–0.5)
EOSINOPHIL NFR BLD AUTO: 0.1 % — SIGNIFICANT CHANGE UP (ref 0–6)
FLUAV AG NPH QL: SIGNIFICANT CHANGE UP
FLUBV AG NPH QL: SIGNIFICANT CHANGE UP
GLUCOSE SERPL-MCNC: 176 MG/DL — HIGH (ref 70–99)
GLUCOSE UR QL: 100 MG/DL
HCT VFR BLD CALC: 30 % — LOW (ref 34.5–45)
HGB BLD-MCNC: 10 G/DL — LOW (ref 11.5–15.5)
IMM GRANULOCYTES NFR BLD AUTO: 0.4 % — SIGNIFICANT CHANGE UP (ref 0–0.9)
INR BLD: 1.08 RATIO — SIGNIFICANT CHANGE UP (ref 0.88–1.16)
KETONES UR-MCNC: NEGATIVE — SIGNIFICANT CHANGE UP
LACTATE SERPL-SCNC: 1.4 MMOL/L — SIGNIFICANT CHANGE UP (ref 0.7–2)
LEUKOCYTE ESTERASE UR-ACNC: ABNORMAL
LYMPHOCYTES # BLD AUTO: 0.75 K/UL — LOW (ref 1–3.3)
LYMPHOCYTES # BLD AUTO: 7.9 % — LOW (ref 13–44)
MCHC RBC-ENTMCNC: 31.2 PG — SIGNIFICANT CHANGE UP (ref 27–34)
MCHC RBC-ENTMCNC: 33.3 GM/DL — SIGNIFICANT CHANGE UP (ref 32–36)
MCV RBC AUTO: 93.5 FL — SIGNIFICANT CHANGE UP (ref 80–100)
MONOCYTES # BLD AUTO: 0.5 K/UL — SIGNIFICANT CHANGE UP (ref 0–0.9)
MONOCYTES NFR BLD AUTO: 5.2 % — SIGNIFICANT CHANGE UP (ref 2–14)
NEUTROPHILS # BLD AUTO: 8.24 K/UL — HIGH (ref 1.8–7.4)
NEUTROPHILS NFR BLD AUTO: 86.3 % — HIGH (ref 43–77)
NITRITE UR-MCNC: POSITIVE
PH UR: 6.5 — SIGNIFICANT CHANGE UP (ref 5–8)
PLATELET # BLD AUTO: 215 K/UL — SIGNIFICANT CHANGE UP (ref 150–400)
POTASSIUM SERPL-MCNC: 3.7 MMOL/L — SIGNIFICANT CHANGE UP (ref 3.5–5.3)
POTASSIUM SERPL-SCNC: 3.7 MMOL/L — SIGNIFICANT CHANGE UP (ref 3.5–5.3)
PROT SERPL-MCNC: 7.9 GM/DL — SIGNIFICANT CHANGE UP (ref 6–8.3)
PROT UR-MCNC: NEGATIVE — SIGNIFICANT CHANGE UP
PROTHROM AB SERPL-ACNC: 12.5 SEC — SIGNIFICANT CHANGE UP (ref 10.5–13.4)
RBC # BLD: 3.21 M/UL — LOW (ref 3.8–5.2)
RBC # FLD: 13.4 % — SIGNIFICANT CHANGE UP (ref 10.3–14.5)
RSV RNA NPH QL NAA+NON-PROBE: SIGNIFICANT CHANGE UP
SARS-COV-2 RNA SPEC QL NAA+PROBE: SIGNIFICANT CHANGE UP
SODIUM SERPL-SCNC: 135 MMOL/L — SIGNIFICANT CHANGE UP (ref 135–145)
SP GR SPEC: 1 — LOW (ref 1.01–1.02)
UROBILINOGEN FLD QL: NEGATIVE — SIGNIFICANT CHANGE UP
WBC # BLD: 9.55 K/UL — SIGNIFICANT CHANGE UP (ref 3.8–10.5)
WBC # FLD AUTO: 9.55 K/UL — SIGNIFICANT CHANGE UP (ref 3.8–10.5)

## 2022-09-16 PROCEDURE — 85025 COMPLETE CBC W/AUTO DIFF WBC: CPT

## 2022-09-16 PROCEDURE — 85027 COMPLETE CBC AUTOMATED: CPT

## 2022-09-16 PROCEDURE — 93010 ELECTROCARDIOGRAM REPORT: CPT

## 2022-09-16 PROCEDURE — 83735 ASSAY OF MAGNESIUM: CPT

## 2022-09-16 PROCEDURE — 83036 HEMOGLOBIN GLYCOSYLATED A1C: CPT

## 2022-09-16 PROCEDURE — 84100 ASSAY OF PHOSPHORUS: CPT

## 2022-09-16 PROCEDURE — 99221 1ST HOSP IP/OBS SF/LOW 40: CPT

## 2022-09-16 PROCEDURE — 71045 X-RAY EXAM CHEST 1 VIEW: CPT | Mod: 26

## 2022-09-16 PROCEDURE — 86803 HEPATITIS C AB TEST: CPT

## 2022-09-16 PROCEDURE — 99285 EMERGENCY DEPT VISIT HI MDM: CPT | Mod: FS

## 2022-09-16 PROCEDURE — 80053 COMPREHEN METABOLIC PANEL: CPT

## 2022-09-16 PROCEDURE — 82962 GLUCOSE BLOOD TEST: CPT

## 2022-09-16 PROCEDURE — 36415 COLL VENOUS BLD VENIPUNCTURE: CPT

## 2022-09-16 PROCEDURE — 80048 BASIC METABOLIC PNL TOTAL CA: CPT

## 2022-09-16 RX ORDER — CEFTRIAXONE 500 MG/1
1000 INJECTION, POWDER, FOR SOLUTION INTRAMUSCULAR; INTRAVENOUS ONCE
Refills: 0 | Status: COMPLETED | OUTPATIENT
Start: 2022-09-16 | End: 2022-09-16

## 2022-09-16 RX ADMIN — CEFTRIAXONE 100 MILLIGRAM(S): 500 INJECTION, POWDER, FOR SOLUTION INTRAMUSCULAR; INTRAVENOUS at 22:14

## 2022-09-16 NOTE — ED ADULT TRIAGE NOTE - CHIEF COMPLAINT QUOTE
PT C/O KIDNEY INFECTION, SENT FROM [facility].  PMH - BLADDER CA, ON REMISSION. PT REPORTS FEVER, BACK PAIN, +ILEOCONDUIT. PT C/O KIDNEY INFECTION, SENT FROM [facility].  PMH - BLADDER CA, ON REMISSION. PT REPORTS FEVER, BACK PAIN, +ILEOCONDUIT.  IV noted over the right fa.

## 2022-09-16 NOTE — ED STATDOCS - OBJECTIVE STATEMENT
73 y/o F with a pMHx of HTN, HLD, bladder CA s/p chemo and neobladder in remission for 11 years, and DM [place] [facility] [place]. pt had CT and labs during [place] [facility] [place]elone[place]ritis and was sent to the ED for further evaluation. Pt presented with a fever on 9/14. last took tylenol at 3pm.

## 2022-09-16 NOTE — ED STATDOCS - PROGRESS NOTE DETAILS
Pt. is a 72 year old female Hx HTN, bladder CA 11 years ago s/p chemo and neobladder, ileoconduit, DM presents with fever.  Pt. was evaluated at [facility] and sent in for pyelonephritis on CT scan.  Pt. presents with CT reading and labs performed at [facility].  Pt. had fever 101 today.  Pt. Took Tylenol PTA.  Pt. has been treated for pyelonephritis in the past.  Kavitha Tamez PA-C + UA however from ileoconduit.   Elevated creatinine 1.52 new from previous visits.   Glucose 176.  Neg. leukocytosis.  Kavitha Tamez PA-C + UA however from ileoconduit.   Elevated creatinine 1.52 new from previous visits.   Glucose 176.  Neg. leukocytosis.  Pt. has CD with CT from today.  Labs also resulted from [facility] and patient has with her.   Kavitha Tamez PA-C

## 2022-09-16 NOTE — ED STATDOCS - ATTENDING APP SHARED VISIT CONTRIBUTION OF CARE
I, Yogesh Jon MD,  performed the initial face to face bedside interview with this patient regarding history of present illness, review of symptoms and relevant past medical, social and family history.  I completed an independent physical examination.  I was the initial provider who evaluated this patient.   I personally saw the patient and performed a substantive portion of the visit including all aspects of the medical decision making.  I have signed out the follow up of any pending tests (i.e. labs, radiological studies) to the ROSLYN.  I have communicated the patient’s plan of care and disposition with the ROSLYN.  The history, relevant review of systems, past medical and surgical history, medical decision making, and physical examination was documented by the scribe in my presence and I attest to the accuracy of the documentation.

## 2022-09-16 NOTE — ED STATDOCS - CLINICAL SUMMARY MEDICAL DECISION MAKING FREE TEXT BOX
DDx: uro sepsis vs. pyelonephritis vs. no symptoms suggesting URI no abd pain.  plan: CBC, CMP, blood cx, CXR, abx, and likely admission.

## 2022-09-17 PROBLEM — I10 ESSENTIAL (PRIMARY) HYPERTENSION: Chronic | Status: ACTIVE | Noted: 2022-02-10

## 2022-09-17 PROBLEM — C67.9 MALIGNANT NEOPLASM OF BLADDER, UNSPECIFIED: Chronic | Status: ACTIVE | Noted: 2022-02-10

## 2022-09-17 PROBLEM — E11.9 TYPE 2 DIABETES MELLITUS WITHOUT COMPLICATIONS: Chronic | Status: ACTIVE | Noted: 2022-02-10

## 2022-09-17 PROBLEM — E78.5 HYPERLIPIDEMIA, UNSPECIFIED: Chronic | Status: ACTIVE | Noted: 2022-02-10

## 2022-09-17 LAB
A1C WITH ESTIMATED AVERAGE GLUCOSE RESULT: 6.7 % — HIGH (ref 4–5.6)
ALBUMIN SERPL ELPH-MCNC: 3 G/DL — LOW (ref 3.3–5)
ALP SERPL-CCNC: 71 U/L — SIGNIFICANT CHANGE UP (ref 40–120)
ALT FLD-CCNC: 22 U/L — SIGNIFICANT CHANGE UP (ref 12–78)
ANION GAP SERPL CALC-SCNC: 8 MMOL/L — SIGNIFICANT CHANGE UP (ref 5–17)
AST SERPL-CCNC: 13 U/L — LOW (ref 15–37)
BASOPHILS # BLD AUTO: 0.02 K/UL — SIGNIFICANT CHANGE UP (ref 0–0.2)
BASOPHILS NFR BLD AUTO: 0.3 % — SIGNIFICANT CHANGE UP (ref 0–2)
BILIRUB SERPL-MCNC: 0.4 MG/DL — SIGNIFICANT CHANGE UP (ref 0.2–1.2)
BUN SERPL-MCNC: 25 MG/DL — HIGH (ref 7–23)
CALCIUM SERPL-MCNC: 8.5 MG/DL — SIGNIFICANT CHANGE UP (ref 8.5–10.1)
CHLORIDE SERPL-SCNC: 106 MMOL/L — SIGNIFICANT CHANGE UP (ref 96–108)
CO2 SERPL-SCNC: 23 MMOL/L — SIGNIFICANT CHANGE UP (ref 22–31)
CREAT SERPL-MCNC: 1.3 MG/DL — SIGNIFICANT CHANGE UP (ref 0.5–1.3)
EGFR: 44 ML/MIN/1.73M2 — LOW
EOSINOPHIL # BLD AUTO: 0.01 K/UL — SIGNIFICANT CHANGE UP (ref 0–0.5)
EOSINOPHIL NFR BLD AUTO: 0.1 % — SIGNIFICANT CHANGE UP (ref 0–6)
ESTIMATED AVERAGE GLUCOSE: 146 MG/DL — HIGH (ref 68–114)
GLUCOSE SERPL-MCNC: 148 MG/DL — HIGH (ref 70–99)
HCT VFR BLD CALC: 27.2 % — LOW (ref 34.5–45)
HCV AB S/CO SERPL IA: 0.08 S/CO — SIGNIFICANT CHANGE UP (ref 0–0.99)
HCV AB SERPL-IMP: SIGNIFICANT CHANGE UP
HGB BLD-MCNC: 8.9 G/DL — LOW (ref 11.5–15.5)
IMM GRANULOCYTES NFR BLD AUTO: 0.5 % — SIGNIFICANT CHANGE UP (ref 0–0.9)
LYMPHOCYTES # BLD AUTO: 0.65 K/UL — LOW (ref 1–3.3)
LYMPHOCYTES # BLD AUTO: 8.7 % — LOW (ref 13–44)
MCHC RBC-ENTMCNC: 30.6 PG — SIGNIFICANT CHANGE UP (ref 27–34)
MCHC RBC-ENTMCNC: 32.7 GM/DL — SIGNIFICANT CHANGE UP (ref 32–36)
MCV RBC AUTO: 93.5 FL — SIGNIFICANT CHANGE UP (ref 80–100)
MONOCYTES # BLD AUTO: 0.81 K/UL — SIGNIFICANT CHANGE UP (ref 0–0.9)
MONOCYTES NFR BLD AUTO: 10.9 % — SIGNIFICANT CHANGE UP (ref 2–14)
NEUTROPHILS # BLD AUTO: 5.91 K/UL — SIGNIFICANT CHANGE UP (ref 1.8–7.4)
NEUTROPHILS NFR BLD AUTO: 79.5 % — HIGH (ref 43–77)
PLATELET # BLD AUTO: 182 K/UL — SIGNIFICANT CHANGE UP (ref 150–400)
POTASSIUM SERPL-MCNC: 3.8 MMOL/L — SIGNIFICANT CHANGE UP (ref 3.5–5.3)
POTASSIUM SERPL-SCNC: 3.8 MMOL/L — SIGNIFICANT CHANGE UP (ref 3.5–5.3)
PROT SERPL-MCNC: 6.7 GM/DL — SIGNIFICANT CHANGE UP (ref 6–8.3)
RBC # BLD: 2.91 M/UL — LOW (ref 3.8–5.2)
RBC # FLD: 13.5 % — SIGNIFICANT CHANGE UP (ref 10.3–14.5)
SODIUM SERPL-SCNC: 137 MMOL/L — SIGNIFICANT CHANGE UP (ref 135–145)
WBC # BLD: 7.44 K/UL — SIGNIFICANT CHANGE UP (ref 3.8–10.5)
WBC # FLD AUTO: 7.44 K/UL — SIGNIFICANT CHANGE UP (ref 3.8–10.5)

## 2022-09-17 PROCEDURE — 99233 SBSQ HOSP IP/OBS HIGH 50: CPT | Mod: GC

## 2022-09-17 RX ORDER — DEXTROSE 50 % IN WATER 50 %
12.5 SYRINGE (ML) INTRAVENOUS ONCE
Refills: 0 | Status: DISCONTINUED | OUTPATIENT
Start: 2022-09-17 | End: 2022-09-19

## 2022-09-17 RX ORDER — ATORVASTATIN CALCIUM 80 MG/1
20 TABLET, FILM COATED ORAL AT BEDTIME
Refills: 0 | Status: DISCONTINUED | OUTPATIENT
Start: 2022-09-17 | End: 2022-09-19

## 2022-09-17 RX ORDER — SODIUM CHLORIDE 9 MG/ML
1000 INJECTION INTRAMUSCULAR; INTRAVENOUS; SUBCUTANEOUS
Refills: 0 | Status: DISCONTINUED | OUTPATIENT
Start: 2022-09-17 | End: 2022-09-17

## 2022-09-17 RX ORDER — INFLUENZA VIRUS VACCINE 15; 15; 15; 15 UG/.5ML; UG/.5ML; UG/.5ML; UG/.5ML
0.7 SUSPENSION INTRAMUSCULAR ONCE
Refills: 0 | Status: DISCONTINUED | OUTPATIENT
Start: 2022-09-17 | End: 2022-09-19

## 2022-09-17 RX ORDER — ENOXAPARIN SODIUM 100 MG/ML
40 INJECTION SUBCUTANEOUS EVERY 24 HOURS
Refills: 0 | Status: DISCONTINUED | OUTPATIENT
Start: 2022-09-17 | End: 2022-09-19

## 2022-09-17 RX ORDER — ACETAMINOPHEN 500 MG
650 TABLET ORAL EVERY 6 HOURS
Refills: 0 | Status: DISCONTINUED | OUTPATIENT
Start: 2022-09-17 | End: 2022-09-19

## 2022-09-17 RX ORDER — CLOPIDOGREL BISULFATE 75 MG/1
75 TABLET, FILM COATED ORAL DAILY
Refills: 0 | Status: DISCONTINUED | OUTPATIENT
Start: 2022-09-17 | End: 2022-09-19

## 2022-09-17 RX ORDER — INSULIN LISPRO 100/ML
VIAL (ML) SUBCUTANEOUS
Refills: 0 | Status: DISCONTINUED | OUTPATIENT
Start: 2022-09-17 | End: 2022-09-19

## 2022-09-17 RX ORDER — ONDANSETRON 8 MG/1
4 TABLET, FILM COATED ORAL EVERY 8 HOURS
Refills: 0 | Status: DISCONTINUED | OUTPATIENT
Start: 2022-09-17 | End: 2022-09-19

## 2022-09-17 RX ORDER — DEXTROSE 50 % IN WATER 50 %
25 SYRINGE (ML) INTRAVENOUS ONCE
Refills: 0 | Status: DISCONTINUED | OUTPATIENT
Start: 2022-09-17 | End: 2022-09-19

## 2022-09-17 RX ORDER — SODIUM CHLORIDE 9 MG/ML
1000 INJECTION, SOLUTION INTRAVENOUS
Refills: 0 | Status: DISCONTINUED | OUTPATIENT
Start: 2022-09-17 | End: 2022-09-19

## 2022-09-17 RX ORDER — IRBESARTAN AND HYDROCHLOROTHIAZIDE 12.5; 3 MG/1; MG/1
1 TABLET ORAL
Qty: 0 | Refills: 0 | DISCHARGE

## 2022-09-17 RX ORDER — ASPIRIN/CALCIUM CARB/MAGNESIUM 324 MG
81 TABLET ORAL DAILY
Refills: 0 | Status: DISCONTINUED | OUTPATIENT
Start: 2022-09-17 | End: 2022-09-19

## 2022-09-17 RX ORDER — SODIUM CHLORIDE 9 MG/ML
1000 INJECTION INTRAMUSCULAR; INTRAVENOUS; SUBCUTANEOUS
Refills: 0 | Status: DISCONTINUED | OUTPATIENT
Start: 2022-09-17 | End: 2022-09-19

## 2022-09-17 RX ORDER — INSULIN LISPRO 100/ML
VIAL (ML) SUBCUTANEOUS AT BEDTIME
Refills: 0 | Status: DISCONTINUED | OUTPATIENT
Start: 2022-09-17 | End: 2022-09-19

## 2022-09-17 RX ORDER — GLUCAGON INJECTION, SOLUTION 0.5 MG/.1ML
1 INJECTION, SOLUTION SUBCUTANEOUS ONCE
Refills: 0 | Status: DISCONTINUED | OUTPATIENT
Start: 2022-09-17 | End: 2022-09-19

## 2022-09-17 RX ORDER — CEFTRIAXONE 500 MG/1
2000 INJECTION, POWDER, FOR SOLUTION INTRAMUSCULAR; INTRAVENOUS EVERY 24 HOURS
Refills: 0 | Status: DISCONTINUED | OUTPATIENT
Start: 2022-09-17 | End: 2022-09-19

## 2022-09-17 RX ORDER — SENNA PLUS 8.6 MG/1
2 TABLET ORAL AT BEDTIME
Refills: 0 | Status: DISCONTINUED | OUTPATIENT
Start: 2022-09-17 | End: 2022-09-19

## 2022-09-17 RX ORDER — LANOLIN ALCOHOL/MO/W.PET/CERES
3 CREAM (GRAM) TOPICAL AT BEDTIME
Refills: 0 | Status: DISCONTINUED | OUTPATIENT
Start: 2022-09-17 | End: 2022-09-17

## 2022-09-17 RX ORDER — DEXTROSE 50 % IN WATER 50 %
15 SYRINGE (ML) INTRAVENOUS ONCE
Refills: 0 | Status: DISCONTINUED | OUTPATIENT
Start: 2022-09-17 | End: 2022-09-19

## 2022-09-17 RX ADMIN — Medication 1: at 17:15

## 2022-09-17 RX ADMIN — Medication 650 MILLIGRAM(S): at 21:44

## 2022-09-17 RX ADMIN — CLOPIDOGREL BISULFATE 75 MILLIGRAM(S): 75 TABLET, FILM COATED ORAL at 09:11

## 2022-09-17 RX ADMIN — Medication 3: at 11:50

## 2022-09-17 RX ADMIN — ENOXAPARIN SODIUM 40 MILLIGRAM(S): 100 INJECTION SUBCUTANEOUS at 09:11

## 2022-09-17 RX ADMIN — ATORVASTATIN CALCIUM 20 MILLIGRAM(S): 80 TABLET, FILM COATED ORAL at 21:45

## 2022-09-17 RX ADMIN — Medication 81 MILLIGRAM(S): at 09:11

## 2022-09-17 RX ADMIN — CEFTRIAXONE 100 MILLIGRAM(S): 500 INJECTION, POWDER, FOR SOLUTION INTRAMUSCULAR; INTRAVENOUS at 21:45

## 2022-09-17 NOTE — H&P ADULT - HISTORY OF PRESENT ILLNESS
73 yo female with PMH bladder cancer s/p chemo (in remission 11 years), ileal conduit,  bilateral carotid artery disease, T2DM, HTN presents for pyelonephritis. Pt states was having bilateral flank pain (L>R) and fever on Wednesday 101.3. She took tylenol for fever.  [place] [facility] [place] [facility] [place]. [place] [facility] [place]one[place]. Pt was sent to  for IV antibiotics Pt states still has some flank pain but improved. Denies hematuria, chest pain, SOB, abdominal pain, vomiting, diarrhea, lightheadedness, dizziness.     In the ED temp 99.3, HR 79, /56   71 yo female with PMH bladder cancer s/p chemo (in remission 11 years), ileal conduit,  bilateral carotid artery disease, T2DM, HTN presents for pyelonephritis. Pt states was having bilateral flank pain (L>R) and fever on Wednesday 101.3. She took tylenol for fever.  She had an [place] today at [facility] [place]. Pt had labs and urine collected at [facility] [place]. CT A/P done at [facility] [place] suggestive of pyelonephritis. Pt was sent to  for IV antibiotics Pt states still has some flank pain but improved. Denies hematuria, chest pain, SOB, abdominal pain, vomiting, diarrhea, lightheadedness, dizziness.     In the ED temp 99.3, HR 79, /56. S/p one dose rocephin in ED   71 yo female with PMH bladder cancer s/p chemo (in remission 11 years), ileal conduit,  bilateral carotid artery disease, T2DM, HTN presents for pyelonephritis. Pt states was having bilateral flank pain (L>R) and fever since Wednesday. Pt checked temperate on Wednesday, was 101.3. She took tylenol for fever. Pt went to [facility] for appt with her urologist today.  Pt had labs and urine [place] [facility] [place]. [place] [facility] [place] suggestive of pyelonephritis. Pt was sent to  for IV antibiotics Pt states still has some flank pain but improved. Denies hematuria, chest pain, SOB, abdominal pain, vomiting, diarrhea, lightheadedness, dizziness.     In the ED temp 99.3, HR 79, /56. S/p one dose rocephin in ED   73 yo female with PMH bladder cancer s/p chemo (in remission 11 years), ileal conduit,  bilateral carotid artery disease, T2DM, HTN presents for pyelonephritis. Pt states was having bilateral flank pain (L>R) and fever since Wednesday. Pt checked temperate on Wednesday, was 101.3. She took tylenol for fever. Pt follows at [facility] for hx bladder cancer, had appt today. Pt had labs and urine collected at [facility] [place]. [place] A/P done at [facility] [place] suggestive of pyelonephritis. Pt was sent to  for IV antibiotics Pt states still has some flank pain but improved. Denies hematuria, chest pain, SOB, abdominal pain, vomiting, diarrhea, lightheadedness, dizziness.     In the ED temp 99.3, HR 79, /56. S/p one dose rocephin in ED

## 2022-09-17 NOTE — PROGRESS NOTE ADULT - ASSESSMENT
71 yo female with PMH bladder cancer s/p chemo (in remission 11 years), ileal conduit,  bilateral carotid artery disease (s/p L carotid stent), T2DM, HTN presents for pyelonephritis    [] Pyelonephritis  -hx bladder cancer s/p ileal conduit   [place] [facility] [place] 1. Since April 27, 2022, no change position of catheter within ileal conduit. 2. No change bilateral hydronephrosis, greater on the right. 3. New and increased bilateral perinephric stranding and urothelial  thickening and enhancement, probably infectious or inflammatory  -s/p one dose rocephin in ED  -continue rocephin 2g  -UA positive nitrite, +leukocyte esterase  -f/u urine culture and blood culture  -pain control  - Tylenol prn fever  -IVF for gentle hydration  -f/u urology consult    [] BELIA  -Creatine on admission 1.52  -baseline Cr 0.8  -IV hydration   -strict i/o's  -hold nephrotoxic medications  -consider nephrology consult    [] Bilateral carotid disease  -s/p L carotid stent 2/22  -continue plavix  -continue ASA  -continue atorvastatin   -follows with Dr. Gant    [] HTN  -BP stable  -takes irbesartan-HCTZ 300-12.5 daily  -hold in setting of BELIA    [] T2DM  -takes Tradjenta and starlix at home  -Hold home oral meds  -Low dose insulin corrective scale  -Monitor FSBG TIDAC/QHS  -Goal FSBG 100-180  -Consistent Carbohydrate diet  -F/u HbA1c    [] HLD  -takes rouvastatin 5 mg at home  -continue atorvastatin 20 mg while in hospital    [] DVT prophylaxis  -lovenox    Case discussed with Dr. Garcia

## 2022-09-17 NOTE — H&P ADULT - NSHPLABSRESULTS_GEN_ALL_CORE
CT A/P done at [facility] [place]:  1. Since April 27, 2022, no change position of catheter within ileal conduit.   2. No change bilateral hydronephrosis, greater on the right.   3. New and increased bilateral perinephric stranding and urothelial  thickening and enhancement, probably infectious or inflammatory

## 2022-09-17 NOTE — H&P ADULT - NSHPPHYSICALEXAM_GEN_ALL_CORE
Vitals  T(F): 98 (09-17-22 @ 01:31), Max: 99.3 (09-16-22 @ 18:06)  HR: 76 (09-17-22 @ 01:31) (76 - 79)  BP: 118/58 (09-17-22 @ 01:31) (118/58 - 127/56)  RR: 16 (09-17-22 @ 01:31) (16 - 18)  SpO2: 100% (09-17-22 @ 01:31) (100% - 100%)    Physical Exam   Gen: NAD, comfortable  HENT: atraumatic head and ears, no gross abnormalities of ears, mucous membranes moist, no oral lesions, neck supple without masses/goiter/lymphadenopathy  CV: RRR, nl s1/s2, no M/R/G  Pulm: nl respiratory effort, CTAB, no wheezes/crackles/rhonchi  Back: no scoliosis, lordosis, or kyphosis, no tenderness  Abd: normoactive bowel sounds in all 4 quadrants, soft, nontender, nondistended, no rebound, no guarding, no masses, +ileal conduit   Extremities: no pedal edema, pedal pulses palpable   Skin: nl warm and dry, no wounds   Neuro: A&Ox3, answering questions appropriately, 5/5 strength in upper and lower extremities bilaterally, sensation intact in upper and lower extremities bilaterally  Pysch: no depression, no SI, no HI

## 2022-09-17 NOTE — PATIENT PROFILE ADULT - FALL HARM RISK - UNIVERSAL INTERVENTIONS
Bed in lowest position, wheels locked, appropriate side rails in place/Call bell, personal items and telephone in reach/Instruct patient to call for assistance before getting out of bed or chair/Non-slip footwear when patient is out of bed/[place] to call system/Physically safe environment - no spills, clutter or unnecessary equipment/Purposeful Proactive Rounding/Room/bathroom lighting operational, light cord in reach

## 2022-09-17 NOTE — H&P ADULT - ASSESSMENT
71 yo female with PMH bladder cancer s/p chemo (in remission 11 years), ileal conduit,  bilateral carotid artery disease (s/p L carotid stent), T2DM, HTN presents for pyelonephritis    #Pyelonephritis  -hx bladder cancer s/p ileal conduit   [place] [facility] [place]: 1. Since April 27, 2022, no change position of catheter within ileal conduit. 2. No change bilateral hydronephrosis, greater on the right. 3. New and increased bilateral perinephric stranding and urothelial  thickening and enhancement, probably infectious or inflammatory  -s/p one dose rocephin in ED  -continue rocephin 1G  -f/u urine culture and blood culture  -pain control  -tylenol prn fever  -IVF for gentle hydration  -f/u urology consult    #BELIA  -Creatine on admission 1.52  -baseline Cr  -IV hydration   -strict i/o's  -hold nephrotoxic medications  -consider nephrology consult    #Bilateral carotid disease  -s/p L carotid stent 2/22  -continue plavix  -continue ASA  -continue atorvastatin   -follows with Dr. Gant    #HTN    #HLD  -takes rouvastatin 5 mg at home  -continue atorvastatin 20 mg while in hospital 73 yo female with PMH bladder cancer s/p chemo (in remission 11 years), ileal conduit,  bilateral carotid artery disease (s/p L carotid stent), T2DM, HTN presents for pyelonephritis    #Pyelonephritis  -hx bladder cancer s/p ileal conduit   -CT A/P done at [facility] [place] today shows: 1. Since April 27, 2022, no change position of catheter within ileal conduit. 2. No change bilateral hydronephrosis, greater on the right. 3. New and increased bilateral perinephric stranding and urothelial  thickening and enhancement, probably infectious or inflammatory  -s/p one dose rocephin in ED  -continue rocephin   -f/u urine culture and blood culture  -pain control  -tylenol prn fever  -IVF for gentle hydration  -f/u urology consult    #BELIA  -Creatine on admission 1.52  -baseline Cr  -IV hydration   -strict i/o's  -hold nephrotoxic medications  -consider nephrology consult    #Bilateral carotid disease  -s/p L carotid stent 2/22  -continue plavix  -continue ASA  -continue atorvastatin   -follows with Dr. Gant    #HTN    #HLD  -takes rouvastatin 5 mg at home  -continue atorvastatin 20 mg while in hospital 73 yo female with PMH bladder cancer s/p chemo (in remission 11 years), ileal conduit,  bilateral carotid artery disease (s/p L carotid stent), T2DM, HTN presents for pyelonephritis    #Pyelonephritis  -hx bladder cancer s/p ileal conduit   -CT A/P done at [facility] [place] today shows: 1. Since April 27, 2022, no change position of catheter within ileal conduit. 2. No change bilateral hydronephrosis, greater on the right. 3. New and increased bilateral perinephric stranding and urothelial  thickening and enhancement, probably infectious or inflammatory  -s/p one dose rocephin in ED  -continue rocephin   -f/u urine culture and blood culture  -pain control  -tylenol prn fever  -IVF for gentle hydration  -f/u urology consult    #BELIA  -Creatine on admission 1.52  -baseline Cr 0.8  -IV hydration   -strict i/o's  -hold nephrotoxic medications  -consider nephrology consult    #Bilateral carotid disease  -s/p L carotid stent 2/22  -continue plavix  -continue ASA  -continue atorvastatin   -follows with Dr. Gant    #HTN  -BP stable  -takes irebsartan 300 mg daily  -hold in setting of BELIA    #HLD  -takes rouvastatin 5 mg at home  -continue atorvastatin 20 mg while in hospital    #DVT prophylaxis  -lovenox    Case discussed with Dr. Marquez 71 yo female with PMH bladder cancer s/p chemo (in remission 11 years), ileal conduit,  bilateral carotid artery disease (s/p L carotid stent), T2DM, HTN presents for pyelonephritis    #Pyelonephritis  -hx bladder cancer s/p ileal conduit   -[place] A/[place] done [place] [facility] [place] shows: 1. Since April 27, 2022, no change position of catheter within ileal conduit. 2. No change bilateral hydronephrosis, greater on the right. 3. New and increased bilateral perinephric stranding and urothelial  thickening and enhancement, probably infectious or inflammatory  -s/p one dose rocephin in ED  -continue rocephin   -UA positive nitrite, +leukocyte esterase  -f/u urine culture and blood culture  -pain control  -tylenol prn fever  -IVF for gentle hydration  -f/u urology consult    #BELIA  -Creatine on admission 1.52  -baseline Cr 0.8  -IV hydration   -strict i/o's  -hold nephrotoxic medications  -consider nephrology consult    #Bilateral carotid disease  -s/p L carotid stent 2/22  -continue plavix  -continue ASA  -continue atorvastatin   -follows with Dr. Gant    #HTN  -BP stable  -takes irebsartan 300 mg daily  -hold in setting of BELIA    #T2DM  -takes Tradjenta and starlix at home  -Hold home oral meds  -Low dose insulin corrective scale  -Monitor FSBG TIDAC/QHS  -Goal FSBG 100-180  -Consistent Carbohydrate diet  -F/u AM HbA1c    #HLD  -takes rouvastatin 5 mg at home  -continue atorvastatin 20 mg while in hospital    #DVT prophylaxis  -lovenox    Case discussed with Dr. Marquez 73 yo female with PMH bladder cancer s/p chemo (in remission 11 years), ileal conduit,  bilateral carotid artery disease (s/p L carotid stent), T2DM, HTN presents for pyelonephritis    #Pyelonephritis  -hx bladder cancer s/p ileal conduit   -CT A/P done at [facility] [place] today shows: 1. Since April 27, 2022, no change position of catheter within ileal conduit. 2. No change bilateral hydronephrosis, greater on the right. 3. New and increased bilateral perinephric stranding and urothelial  thickening and enhancement, probably infectious or inflammatory  -s/p one dose rocephin in ED  -continue rocephin   -UA positive nitrite, +leukocyte esterase  -f/u urine culture and blood culture  -pain control  -tylenol prn fever  -IVF for gentle hydration  -f/u urology consult    #BELIA  -Creatine on admission 1.52  -baseline Cr 0.8  -IV hydration   -strict i/o's  -hold nephrotoxic medications  -consider nephrology consult    #Bilateral carotid disease  -s/p L carotid stent 2/22  -continue plavix  -continue ASA  -continue atorvastatin   -follows with Dr. Gant    #HTN  -BP stable  -takes irebsartan 300 mg daily  -hold in setting of BELIA    #T2DM  -takes Tradjenta and starlix at home  -Hold home oral meds  -Low dose insulin corrective scale  -Monitor FSBG TIDAC/QHS  -Goal FSBG 100-180  -Consistent Carbohydrate diet  -F/u AM HbA1c    #HLD  -takes rouvastatin 5 mg at home  -continue atorvastatin 20 mg while in hospital    #DVT prophylaxis  -lovenox    Case discussed with Dr. Marquez    ----------------    ATTENDING NOTE:    Patient seen and examined after initial evaluation above by family medicine resident. Case discussed and reviewed in detail. Please note my plan below.    73 y/o F w/ PMH of bladder cancer s/p chemo, ileal conduit, carotid artery disease s/p L carotid stent, DM2, HTN, p/w flank pain     *Pyelonephritis  -CT abd = No change moderate right   hydroureteronephrosis to the ileal conduit  -Ceftriaxone  -F/u urine / blood cx  -No sepsis  -IVF  - consult     *Suspect BELIA  -IVF and trend creatinine in AM to check for improvement  -Avoid nephrotoxic agents  -I/Os  -Hold ARB temporarily     *DM2  -Humalog ISS  -Diabetic diet     *Bladder cancer / carotid artery disease / HTN  -C/w home meds and f/u outpatient for further management if conditions remain stable during hospitalization     *DVT ppx  -Lovenox  73 yo female with PMH bladder cancer s/p chemo (in remission 11 years), ileal conduit,  bilateral carotid artery disease (s/p L carotid stent), T2DM, HTN presents for pyelonephritis    #Pyelonephritis  -hx bladder cancer s/p ileal conduit   -CT A/P done at [facility] [place] shows: 1. Since April 27, 2022, no change position of catheter within ileal conduit. 2. No change bilateral hydronephrosis, greater on the right. 3. New and increased bilateral perinephric stranding and urothelial  thickening and enhancement, probably infectious or inflammatory  -s/p one dose rocephin in ED  -continue rocephin   -UA positive nitrite, +leukocyte esterase  -f/u urine culture and blood culture  -pain control  -tylenol prn fever  -IVF for gentle hydration  -f/u urology consult    #BELIA  -Creatine on admission 1.52  -baseline Cr 0.8  -IV hydration   -strict i/o's  -hold nephrotoxic medications  -consider nephrology consult    #Bilateral carotid disease  -s/p L carotid stent 2/22  -continue plavix  -continue ASA  -continue atorvastatin   -follows with Dr. Gant    #HTN  -BP stable  -takes irbesartan-HCTZ 300-12.5 daily  -hold in setting of BELIA    #T2DM  -takes Tradjenta and starlix at home  -Hold home oral meds  -Low dose insulin corrective scale  -Monitor FSBG TIDAC/QHS  -Goal FSBG 100-180  -Consistent Carbohydrate diet  -F/u AM HbA1c    #HLD  -takes rouvastatin 5 mg at home  -continue atorvastatin 20 mg while in hospital    #DVT prophylaxis  -lovenox    Case discussed with Dr. Marquez    ----------------    ATTENDING NOTE:    Patient seen and examined after initial evaluation above by family medicine resident. Case discussed and reviewed in detail. Please note my plan below.    71 y/o F w/ PMH of bladder cancer s/p chemo, ileal conduit, carotid artery disease s/p L carotid stent, DM2, HTN, p/w flank pain     *Pyelonephritis  -CT abd = No change moderate right   hydroureteronephrosis to the ileal conduit  -Ceftriaxone  -F/u urine / blood cx  -No sepsis  -IVF  - consult     *Suspect BELIA  -IVF and trend creatinine in AM to check for improvement  -Avoid nephrotoxic agents  -I/Os  -Hold ARB temporarily     *DM2  -Humalog ISS  -Diabetic diet     *Bladder cancer / carotid artery disease / HTN  -C/w home meds and f/u outpatient for further management if conditions remain stable during hospitalization     *DVT ppx  -Lovenox

## 2022-09-17 NOTE — PROGRESS NOTE ADULT - SUBJECTIVE AND OBJECTIVE BOX
HPI:  71 yo female with PMH bladder cancer s/p chemo (in remission 11 years), ileal conduit,  bilateral carotid artery disease, T2DM, HTN presents for pyelonephritis. Pt states was having bilateral flank pain (L>R) and fever since Wednesday. Pt checked temperate on Wednesday, was 101.3. She took tylenol for fever. Pt follows at [facility] for hx bladder cancer, had appt today. Pt had labs and urine collected at [facility] [place], positive for UTI. CT A/P done at [facility] [place] suggestive of pyelonephritis. Pt was sent to  for IV antibiotics Pt states still has some flank pain but improved. Denies hematuria, chest pain, SOB, abdominal pain, vomiting, diarrhea, lightheadedness, dizziness.     In the ED temp 99.3, HR 79, /56. S/p one dose rocephin in ED      SUBJECTIVE:   9/17: Patient seen and Examined at bedside. Pt states her flank pain has improved. Pt has no other acute complaints at this time.     REVIEW OF SYSTEMS:  CONSTITUTIONAL: No weakness, fevers or chills  EYES/ENT: No visual changes;  No vertigo or throat pain   NECK: No pain or stiffness  RESPIRATORY: No cough, wheezing, hemoptysis; No shortness of breath  CARDIOVASCULAR: No chest pain or palpitations  GASTROINTESTINAL: No abdominal or epigastric pain. No nausea, vomiting, or hematemesis; No diarrhea or constipation. No melena or hematochezia.  GENITOURINARY: + flank pain.   NEUROLOGICAL: No numbness or weakness  SKIN: No itching, burning, rashes, or lesions   All other review of systems is negative unless indicated above    Vital Signs Last 24 Hrs  T(C): 37.1 (17 Sep 2022 15:40), Max: 37.6 (17 Sep 2022 02:30)  T(F): 98.7 (17 Sep 2022 15:40), Max: 99.7 (17 Sep 2022 02:30)  HR: 78 (17 Sep 2022 15:40) (72 - 82)  BP: 110/53 (17 Sep 2022 15:40) (110/53 - 127/56)  BP(mean): 79 (16 Sep 2022 20:35) (77 - 79)  RR: 18 (17 Sep 2022 15:40) (16 - 18)  SpO2: 100% (17 Sep 2022 15:40) (96% - 100%)    Parameters below as of 17 Sep 2022 15:40  Patient On (Oxygen Delivery Method): room air        I&O's Summary    17 Sep 2022 07:01  -  17 Sep 2022 16:52  --------------------------------------------------------  IN: 990 mL / OUT: 1800 mL / NET: -810 mL        CAPILLARY BLOOD GLUCOSE      POCT Blood Glucose.: 258 mg/dL (17 Sep 2022 11:45)  POCT Blood Glucose.: 137 mg/dL (17 Sep 2022 08:17)  POCT Blood Glucose.: 190 mg/dL (17 Sep 2022 03:17)      PHYSICAL EXAM:  Constitutional: NAD, awake and alert, well-developed  HEENT: PERR, EOMI, Normal Hearing, MMM  Neck: Soft and supple, No LAD, No JVD  Respiratory: Breath sounds are clear bilaterally, No wheezing, rales or rhonchi  Cardiovascular: S1 and S2, regular rate and rhythm, no Murmurs, gallops or rubs  Gastrointestinal: Bowel Sounds present, soft, nontender, nondistended, no guarding, no rebound  Extremities: No peripheral edema  Vascular: 2+ peripheral pulses  Neurological: A/O x 3, no focal deficits  Musculoskeletal: 5/5 strength b/l upper and lower extremities  Skin: No rashes    MEDICATIONS:  MEDICATIONS  (STANDING):  aspirin  chewable 81 milliGRAM(s) Oral daily  atorvastatin 20 milliGRAM(s) Oral at bedtime  cefTRIAXone   IVPB 2000 milliGRAM(s) IV Intermittent every 24 hours  clopidogrel Tablet 75 milliGRAM(s) Oral daily  dextrose 5%. 1000 milliLiter(s) (50 mL/Hr) IV Continuous <Continuous>  dextrose 5%. 1000 milliLiter(s) (100 mL/Hr) IV Continuous <Continuous>  dextrose 50% Injectable 25 Gram(s) IV Push once  dextrose 50% Injectable 12.5 Gram(s) IV Push once  dextrose 50% Injectable 25 Gram(s) IV Push once  enoxaparin Injectable 40 milliGRAM(s) SubCutaneous every 24 hours  glucagon  Injectable 1 milliGRAM(s) IntraMuscular once  influenza  Vaccine (HIGH DOSE) 0.7 milliLiter(s) IntraMuscular once  insulin lispro (ADMELOG) corrective regimen sliding scale   SubCutaneous three times a day before meals  insulin lispro (ADMELOG) corrective regimen sliding scale   SubCutaneous at bedtime  senna 2 Tablet(s) Oral at bedtime  sodium chloride 0.9%. 1000 milliLiter(s) (75 mL/Hr) IV Continuous <Continuous>      LABS: All Labs Reviewed:                        8.9    7.44  )-----------( 182      ( 17 Sep 2022 06:14 )             27.2     09-17    137  |  106  |  25<H>  ----------------------------<  148<H>  3.8   |  23  |  1.30    Ca    8.5      17 Sep 2022 06:14    TPro  6.7  /  Alb  3.0<L>  /  TBili  0.4  /  DBili  x   /  AST  13<L>  /  ALT  22  /  AlkPhos  71  09-17    PT/INR - ( 16 Sep 2022 20:01 )   PT: 12.5 sec;   INR: 1.08 ratio         PTT - ( 16 Sep 2022 20:01 )  PTT:28.6 sec      Blood Culture:     RADIOLOGY/EKG:   HPI:  73 yo female with PMH bladder cancer s/p chemo (in remission 11 years), ileal conduit,  bilateral carotid artery disease, T2DM, HTN presents for pyelonephritis. Pt states was having bilateral flank pain (L>R) and fever since Wednesday. Pt checked temperate on Wednesday, was 101.3. She took tylenol for fever. Pt follows at [facility] for hx bladder cancer, had appt today. [place] [facility] [place]. [place] [facility] [place]. Pt was sent to  for IV antibiotics Pt states still has some flank pain but improved. Denies hematuria, chest pain, SOB, abdominal pain, vomiting, diarrhea, lightheadedness, dizziness.     In the ED temp 99.3, HR 79, /56. S/p one dose rocephin in ED      SUBJECTIVE:   9/17: Patient seen and Examined at bedside. Pt states her flank pain has improved. Pt has no other acute complaints at this time.     REVIEW OF SYSTEMS:  CONSTITUTIONAL: No weakness, fevers or chills  EYES/ENT: No visual changes;  No vertigo or throat pain   NECK: No pain or stiffness  RESPIRATORY: No cough, wheezing, hemoptysis; No shortness of breath  CARDIOVASCULAR: No chest pain or palpitations  GASTROINTESTINAL: No abdominal or epigastric pain. No nausea, vomiting, or hematemesis; No diarrhea or constipation. No melena or hematochezia.  GENITOURINARY: + flank pain.   NEUROLOGICAL: No numbness or weakness  SKIN: No itching, burning, rashes, or lesions   All other review of systems is negative unless indicated above    Vital Signs Last 24 Hrs  T(C): 37.1 (17 Sep 2022 15:40), Max: 37.6 (17 Sep 2022 02:30)  T(F): 98.7 (17 Sep 2022 15:40), Max: 99.7 (17 Sep 2022 02:30)  HR: 78 (17 Sep 2022 15:40) (72 - 82)  BP: 110/53 (17 Sep 2022 15:40) (110/53 - 127/56)  BP(mean): 79 (16 Sep 2022 20:35) (77 - 79)  RR: 18 (17 Sep 2022 15:40) (16 - 18)  SpO2: 100% (17 Sep 2022 15:40) (96% - 100%)    Parameters below as of 17 Sep 2022 15:40  Patient On (Oxygen Delivery Method): room air        I&O's Summary    17 Sep 2022 07:01  -  17 Sep 2022 16:52  --------------------------------------------------------  IN: 990 mL / OUT: 1800 mL / NET: -810 mL        CAPILLARY BLOOD GLUCOSE      POCT Blood Glucose.: 258 mg/dL (17 Sep 2022 11:45)  POCT Blood Glucose.: 137 mg/dL (17 Sep 2022 08:17)  POCT Blood Glucose.: 190 mg/dL (17 Sep 2022 03:17)      PHYSICAL EXAM:  Constitutional: NAD, awake and alert, well-developed  HEENT: PERR, EOMI, Normal Hearing, MMM  Neck: Soft and supple, No LAD, No JVD  Respiratory: Breath sounds are clear bilaterally, No wheezing, rales or rhonchi  Cardiovascular: S1 and S2, regular rate and rhythm, no Murmurs, gallops or rubs  Gastrointestinal: +ileal conduit and mild right CVA tenderness. Bowel Sounds present, soft, nontender, nondistended, no guarding, no rebound  Extremities: No peripheral edema  Vascular: 2+ peripheral pulses  Neurological: A/O x 3, no focal deficits  Musculoskeletal: 5/5 strength b/l upper and lower extremities  Skin: No rashes    MEDICATIONS:  MEDICATIONS  (STANDING):  aspirin  chewable 81 milliGRAM(s) Oral daily  atorvastatin 20 milliGRAM(s) Oral at bedtime  cefTRIAXone   IVPB 2000 milliGRAM(s) IV Intermittent every 24 hours  clopidogrel Tablet 75 milliGRAM(s) Oral daily  dextrose 5%. 1000 milliLiter(s) (50 mL/Hr) IV Continuous <Continuous>  dextrose 5%. 1000 milliLiter(s) (100 mL/Hr) IV Continuous <Continuous>  dextrose 50% Injectable 25 Gram(s) IV Push once  dextrose 50% Injectable 12.5 Gram(s) IV Push once  dextrose 50% Injectable 25 Gram(s) IV Push once  enoxaparin Injectable 40 milliGRAM(s) SubCutaneous every 24 hours  glucagon  Injectable 1 milliGRAM(s) IntraMuscular once  influenza  Vaccine (HIGH DOSE) 0.7 milliLiter(s) IntraMuscular once  insulin lispro (ADMELOG) corrective regimen sliding scale   SubCutaneous three times a day before meals  insulin lispro (ADMELOG) corrective regimen sliding scale   SubCutaneous at bedtime  senna 2 Tablet(s) Oral at bedtime  sodium chloride 0.9%. 1000 milliLiter(s) (75 mL/Hr) IV Continuous <Continuous>      LABS: All Labs Reviewed:                        8.9    7.44  )-----------( 182      ( 17 Sep 2022 06:14 )             27.2     09-17    137  |  106  |  25<H>  ----------------------------<  148<H>  3.8   |  23  |  1.30    Ca    8.5      17 Sep 2022 06:14    TPro  6.7  /  Alb  3.0<L>  /  TBili  0.4  /  DBili  x   /  AST  13<L>  /  ALT  22  /  AlkPhos  71  09-17    PT/INR - ( 16 Sep 2022 20:01 )   PT: 12.5 sec;   INR: 1.08 ratio         PTT - ( 16 Sep 2022 20:01 )  PTT:28.6 sec      Blood Culture:     RADIOLOGY/EKG:

## 2022-09-17 NOTE — ED ADULT NURSE NOTE - OBJECTIVE STATEMENT
patient axox3, sent in from [facility] [place], states she had a recent CT that shows pyelonephritis. patient denies headache, vision changes, n/v/d, chills, cough, chest pain, SOB. patient states she has had fever on 9/13. color good, skin warm and dry, respirations even and unlabored. patient able to speak in full sentences.

## 2022-09-17 NOTE — ED ADULT NURSE NOTE - CHIEF COMPLAINT QUOTE
PT C/O KIDNEY INFECTION, SENT FROM [facility].  PMH - BLADDER CA, ON REMISSION. PT REPORTS FEVER, BACK PAIN, +ILEOCONDUIT.  IV noted over the right fa.

## 2022-09-17 NOTE — CONSULT NOTE ADULT - SUBJECTIVE AND OBJECTIVE BOX
Patient is a 72y old  Female who presents with a chief complaint of pyelonephritis (17 Sep 2022 01:31)      HPI:  71 yo female with PMH T2 bladder cancer s/p michel adj chemo, radical cystectomy and ileal conduit  (in remission 11 years),   bilateral carotid artery disease, T2DM, HTN presents for pyelonephritis. Pt states was having bilateral flank pain (L>R) and fever since Wednesday. Pt checked temperate on Wednesday, was 101.3. She took tylenol for fever. Pt [place] and [place] [facility] [place] [facility] [place]. [place] [facility] [place] of [place]. Pt was sent to  for IV antibiotics   pt started on IV Abx and IV fluids.  Feels much better.  afebrile this am.  no more chills or flank pain      REVIEW OF SYSTEMS:    CONSTITUTIONAL: No weakness, fevers or chills  EYES/ENT: No visual changes;  No vertigo or throat pain   NECK: No pain or stiffness  RESPIRATORY: No cough, wheezing, hemoptysis; No shortness of breath  CARDIOVASCULAR: No chest pain or palpitations  GASTROINTESTINAL: No abdominal or epigastric pain. No nausea, vomiting, or hematemesis; No diarrhea or constipation. No melena or hematochezia.  GENITOURINARY: No dysuria, frequency or hematuria  NEUROLOGICAL: No numbness or weakness  SKIN: No itching, burning, rashes, or lesions   All other review of systems is negative unless indicated above.    PAST MEDICAL & SURGICAL HISTORY:  DM (diabetes mellitus)      Bladder cancer  Has ilial conduit-urostomy- drainage bag      Hyperlipidemia      Hypertension          SOCIAL HISTORY:    FAMILY HISTORY:      MEDICATIONS  (STANDING):  aspirin  chewable 81 milliGRAM(s) Oral daily  atorvastatin 20 milliGRAM(s) Oral at bedtime  cefTRIAXone   IVPB 2000 milliGRAM(s) IV Intermittent every 24 hours  clopidogrel Tablet 75 milliGRAM(s) Oral daily  dextrose 5%. 1000 milliLiter(s) (50 mL/Hr) IV Continuous <Continuous>  dextrose 5%. 1000 milliLiter(s) (100 mL/Hr) IV Continuous <Continuous>  dextrose 50% Injectable 25 Gram(s) IV Push once  dextrose 50% Injectable 12.5 Gram(s) IV Push once  dextrose 50% Injectable 25 Gram(s) IV Push once  enoxaparin Injectable 40 milliGRAM(s) SubCutaneous every 24 hours  glucagon  Injectable 1 milliGRAM(s) IntraMuscular once  influenza  Vaccine (HIGH DOSE) 0.7 milliLiter(s) IntraMuscular once  insulin lispro (ADMELOG) corrective regimen sliding scale   SubCutaneous three times a day before meals  insulin lispro (ADMELOG) corrective regimen sliding scale   SubCutaneous at bedtime  senna 2 Tablet(s) Oral at bedtime  sodium chloride 0.9%. 1000 milliLiter(s) (75 mL/Hr) IV Continuous <Continuous>    MEDICATIONS  (PRN):  acetaminophen     Tablet .. 650 milliGRAM(s) Oral every 6 hours PRN Temp greater or equal to 38C (100.4F), Mild Pain (1 - 3)  aluminum hydroxide/magnesium hydroxide/simethicone Suspension 30 milliLiter(s) Oral every 4 hours PRN Dyspepsia  dextrose Oral Gel 15 Gram(s) Oral once PRN Blood Glucose LESS THAN 70 milliGRAM(s)/deciliter  ondansetron Injectable 4 milliGRAM(s) IV Push every 8 hours PRN Nausea and/or Vomiting      Allergies    Levaquin (Unknown)  Originally Entered as [Unknown] reaction to [famciclovir] (Unknown)  sulfa drugs (Unknown)    Intolerances        Vital Signs Last 24 Hrs  T(C): 36.6 (17 Sep 2022 07:45), Max: 37.6 (17 Sep 2022 02:30)  T(F): 97.9 (17 Sep 2022 07:45), Max: 99.7 (17 Sep 2022 02:30)  HR: 72 (17 Sep 2022 07:45) (72 - 82)  BP: 110/59 (17 Sep 2022 07:45) (110/59 - 127/56)  BP(mean): 79 (16 Sep 2022 20:35) (77 - 79)  RR: 18 (17 Sep 2022 07:45) (16 - 18)  SpO2: 96% (17 Sep 2022 07:45) (96% - 100%)    Parameters below as of 17 Sep 2022 07:45  Patient On (Oxygen Delivery Method): room air        PHYSICAL EXAM  General: adult in NAD  HEENT: clear oropharynx, anicteric sclera, pink conjunctiva  Neck: supple  CV: normal S1/S2 with no murmur rubs or gallops  Lungs: positive air movement b/l ant lungs,clear to auscultation, no wheezes, no rales  Abdomen: soft non-tender non-distended, no hepatosplenomegaly  Ext: no clubbing cyanosis or edema  Skin: no rashes and no petechiae  Neuro: alert and oriented X 4, no focal deficits      LABS:                          8.9    7.44  )-----------( 182      ( 17 Sep 2022 06:14 )             27.2         Mean Cell Volume : 93.5 fl  Mean Cell Hemoglobin : 30.6 pg  Mean Cell Hemoglobin Concentration : 32.7 gm/dL  Auto Neutrophil # : 5.91 K/uL  Auto Lymphocyte # : 0.65 K/uL  Auto Monocyte # : 0.81 K/uL  Auto Eosinophil # : 0.01 K/uL  Auto Basophil # : 0.02 K/uL  Auto Neutrophil % : 79.5 %  Auto Lymphocyte % : 8.7 %  Auto Monocyte % : 10.9 %  Auto Eosinophil % : 0.1 %  Auto Basophil % : 0.3 %      Serial CBC's  09-17 @ 06:14  Hct-27.2 / Hgb-8.9 / Plat-182 / RBC-2.91 / WBC-7.44  Serial CBC's  09-16 @ 20:01  Hct-30.0 / Hgb-10.0 / Plat-215 / RBC-3.21 / WBC-9.55      09-17    137  |  106  |  25<H>  ----------------------------<  148<H>  3.8   |  23  |  1.30    Ca    8.5      17 Sep 2022 06:14    TPro  6.7  /  Alb  3.0<L>  /  TBili  0.4  /  DBili  x   /  AST  13<L>  /  ALT  22  /  AlkPhos  71  09-17      PT/INR - ( 16 Sep 2022 20:01 )   PT: 12.5 sec;   INR: 1.08 ratio         PTT - ( 16 Sep 2022 20:01 )  PTT:28.6 sec                BLOOD SMEAR INTERPRETATION:       RADIOLOGY & ADDITIONAL STUDIES:

## 2022-09-17 NOTE — CONSULT NOTE ADULT - ASSESSMENT
71 yo female with PMH T2 bladder cancer s/p michel adj chemo, radical cystectomy and ileal conduit  (in remission 11 years),   presents with bilateral flank pain (L>R) and fever since Wednesday. Pt had labs and urine collected at [facility] [place], positive for UTI. CT A/P done at [facility] [place] suggestive of pyelonephritis.     Bladder Ca (as above)  - in remission   - follows at [facility] [place]  - last surveillance CT urogram 4/2022 - [place]     Left Pyelonephritis  - clinically much improved with IV Abx   - Urology eval pending   - f/u cultures    mild BELIA  - Cr 1.5=>1.3  - IV hydration   - monitor for now     will update [facility] [place]    Philip Villa MD  [facility]  [phone]

## 2022-09-17 NOTE — H&P ADULT - NSHPREVIEWOFSYSTEMS_GEN_ALL_CORE
REVIEW OF SYSTEMS:    CONSTITUTIONAL: No weakness, +fevers or chills  EYES/ENT: No visual changes;  No vertigo or throat pain   NECK: No pain or stiffness  RESPIRATORY: No cough, wheezing, hemoptysis; No shortness of breath  CARDIOVASCULAR: No chest pain or palpitations  GASTROINTESTINAL: No abdominal or epigastric pain. No nausea, vomiting, or hematemesis; No diarrhea or constipation. No melena or hematochezia.  GENITOURINARY: No dysuria, frequency or hematuria, + flank pain  NEUROLOGICAL: No numbness or weakness  SKIN: No itching, rashes

## 2022-09-18 LAB
ANION GAP SERPL CALC-SCNC: 4 MMOL/L — LOW (ref 5–17)
BUN SERPL-MCNC: 25 MG/DL — HIGH (ref 7–23)
CALCIUM SERPL-MCNC: 9.1 MG/DL — SIGNIFICANT CHANGE UP (ref 8.5–10.1)
CHLORIDE SERPL-SCNC: 110 MMOL/L — HIGH (ref 96–108)
CO2 SERPL-SCNC: 25 MMOL/L — SIGNIFICANT CHANGE UP (ref 22–31)
CREAT SERPL-MCNC: 1.22 MG/DL — SIGNIFICANT CHANGE UP (ref 0.5–1.3)
CULTURE RESULTS: SIGNIFICANT CHANGE UP
EGFR: 48 ML/MIN/1.73M2 — LOW
GLUCOSE SERPL-MCNC: 159 MG/DL — HIGH (ref 70–99)
HCT VFR BLD CALC: 28.2 % — LOW (ref 34.5–45)
HGB BLD-MCNC: 9.2 G/DL — LOW (ref 11.5–15.5)
MCHC RBC-ENTMCNC: 30.4 PG — SIGNIFICANT CHANGE UP (ref 27–34)
MCHC RBC-ENTMCNC: 32.6 GM/DL — SIGNIFICANT CHANGE UP (ref 32–36)
MCV RBC AUTO: 93.1 FL — SIGNIFICANT CHANGE UP (ref 80–100)
PLATELET # BLD AUTO: 198 K/UL — SIGNIFICANT CHANGE UP (ref 150–400)
POTASSIUM SERPL-MCNC: 4 MMOL/L — SIGNIFICANT CHANGE UP (ref 3.5–5.3)
POTASSIUM SERPL-SCNC: 4 MMOL/L — SIGNIFICANT CHANGE UP (ref 3.5–5.3)
RBC # BLD: 3.03 M/UL — LOW (ref 3.8–5.2)
RBC # FLD: 13.1 % — SIGNIFICANT CHANGE UP (ref 10.3–14.5)
SODIUM SERPL-SCNC: 139 MMOL/L — SIGNIFICANT CHANGE UP (ref 135–145)
SPECIMEN SOURCE: SIGNIFICANT CHANGE UP
WBC # BLD: 4.93 K/UL — SIGNIFICANT CHANGE UP (ref 3.8–10.5)
WBC # FLD AUTO: 4.93 K/UL — SIGNIFICANT CHANGE UP (ref 3.8–10.5)

## 2022-09-18 PROCEDURE — 99233 SBSQ HOSP IP/OBS HIGH 50: CPT | Mod: GC

## 2022-09-18 RX ORDER — METOPROLOL TARTRATE 50 MG
100 TABLET ORAL DAILY
Refills: 0 | Status: DISCONTINUED | OUTPATIENT
Start: 2022-09-18 | End: 2022-09-19

## 2022-09-18 RX ADMIN — SENNA PLUS 2 TABLET(S): 8.6 TABLET ORAL at 21:10

## 2022-09-18 RX ADMIN — Medication 3: at 11:47

## 2022-09-18 RX ADMIN — ENOXAPARIN SODIUM 40 MILLIGRAM(S): 100 INJECTION SUBCUTANEOUS at 09:28

## 2022-09-18 RX ADMIN — ATORVASTATIN CALCIUM 20 MILLIGRAM(S): 80 TABLET, FILM COATED ORAL at 21:10

## 2022-09-18 RX ADMIN — Medication 1: at 17:27

## 2022-09-18 RX ADMIN — Medication 81 MILLIGRAM(S): at 09:28

## 2022-09-18 RX ADMIN — CEFTRIAXONE 100 MILLIGRAM(S): 500 INJECTION, POWDER, FOR SOLUTION INTRAMUSCULAR; INTRAVENOUS at 21:10

## 2022-09-18 RX ADMIN — CLOPIDOGREL BISULFATE 75 MILLIGRAM(S): 75 TABLET, FILM COATED ORAL at 09:28

## 2022-09-18 NOTE — CHART NOTE - NSCHARTNOTEFT_GEN_A_CORE
Patient with complaint of "feeling off". MD evaluated at bedside. Pt reports she became very sweaty and restless after receiving her antibiotic treatment. Afebrile, BP elevated at 164/70. NAD. Denies chest pain, headache, dizziness, abdominal pain, SOB. Physical exam unremarkable. Patient with complaint of "feeling off". MD evaluated at bedside. Pt reports she became very sweaty and restless after receiving her antibiotic treatment. Afebrile, BP elevated at 164/70. NAD. Denies chest pain, headache, dizziness, abdominal pain, SOB. Physical exam unremarkable. /66 on repeat and .

## 2022-09-18 NOTE — PROGRESS NOTE ADULT - SUBJECTIVE AND OBJECTIVE BOX
HPI: 71 yo female with PMH bladder cancer s/p chemo (in remission 11 years), ileal conduit,  bilateral carotid artery disease, T2DM, HTN presents for pyelonephritis. Pt states was having bilateral flank pain (L>R) and fever since Wednesday. Pt checked temperate on Wednesday, was 101.3. She took tylenol for fever. Pt follows at [facility] for hx bladder cancer, had appt today. Pt [place] labs and [place] [facility] [place]. [place] [facility] [place] of [place]elone[place]. Pt was sent to  for IV antibiotics Pt states still has some flank pain but improved. Denies hematuria, chest pain, SOB, abdominal pain, vomiting, diarrhea, lightheadedness, dizziness. In the ED temp 99.3, HR 79, /56. S/p one dose rocephin in ED    09/18: Pt seen at bedside, was previously seen ambulating the hallways, states she feels well, flank pain improving.     REVIEW OF SYSTEMS:    CONSTITUTIONAL: No weakness, fevers or chills  EYES/ENT: No visual changes;  No vertigo or throat pain   NECK: No pain or stiffness  RESPIRATORY: No cough, wheezing, hemoptysis; No shortness of breath  CARDIOVASCULAR: No chest pain or palpitations  GASTROINTESTINAL: No abdominal or epigastric pain. No nausea, vomiting, or hematemesis; No diarrhea or constipation. No melena or hematochezia.  GENITOURINARY: No dysuria, frequency or hematuria  NEUROLOGICAL: No numbness or weakness  SKIN: No itching, rashes    Vitals:  ============  T(F): 97.3 (18 Sep 2022 15:39), Max: 98.2 (18 Sep 2022 00:01)  HR: 72 (18 Sep 2022 15:39)  BP: 150/65 (18 Sep 2022 15:39)  RR: 17 (18 Sep 2022 15:39)  SpO2: 100% (18 Sep 2022 15:39) (98% - 100%)  temp max in last 48H T(F): , Max: 99.7 (09-17-22 @ 02:30)    Physical Exam   Constitutional: NAD, awake and alert, well-developed  HEENT: PERR, EOMI, Normal Hearing, MMM  Neck: Soft and supple, No LAD, No JVD  Respiratory: Breath sounds are clear bilaterally, No wheezing, rales or rhonchi  Cardiovascular: S1 and S2, regular rate and rhythm, no Murmurs, gallops or rubs  Gastrointestinal: +ileal conduit and mild right CVA tenderness. Bowel Sounds present, soft, nontender, nondistended, no guarding, no rebound  Extremities: No peripheral edema  Vascular: 2+ peripheral pulses  Neurological: A/O x 3, no focal deficits  Musculoskeletal: 5/5 strength b/l upper and lower extremities  Skin: No rashes    =======================================================  Current Antibiotics:  cefTRIAXone   IVPB 2000 milliGRAM(s) IV Intermittent every 24 hours    Other medications:  aspirin  chewable 81 milliGRAM(s) Oral daily  atorvastatin 20 milliGRAM(s) Oral at bedtime  clopidogrel Tablet 75 milliGRAM(s) Oral daily  dextrose 5%. 1000 milliLiter(s) IV Continuous <Continuous>  dextrose 5%. 1000 milliLiter(s) IV Continuous <Continuous>  dextrose 50% Injectable 25 Gram(s) IV Push once  dextrose 50% Injectable 12.5 Gram(s) IV Push once  dextrose 50% Injectable 25 Gram(s) IV Push once  enoxaparin Injectable 40 milliGRAM(s) SubCutaneous every 24 hours  glucagon  Injectable 1 milliGRAM(s) IntraMuscular once  influenza  Vaccine (HIGH DOSE) 0.7 milliLiter(s) IntraMuscular once  insulin lispro (ADMELOG) corrective regimen sliding scale   SubCutaneous three times a day before meals  insulin lispro (ADMELOG) corrective regimen sliding scale   SubCutaneous at bedtime  metoprolol succinate  milliGRAM(s) Oral daily  senna 2 Tablet(s) Oral at bedtime  sodium chloride 0.9%. 1000 milliLiter(s) IV Continuous <Continuous>          =======================================================  Labs:                        9.2    4.93  )-----------( 198      ( 18 Sep 2022 08:09 )             28.2     09-18    139  |  110<H>  |  25<H>  ----------------------------<  159<H>  4.0   |  25  |  1.22    Ca    9.1      18 Sep 2022 08:09    TPro  6.7  /  Alb  3.0<L>  /  TBili  0.4  /  DBili  x   /  AST  13<L>  /  ALT  22  /  AlkPhos  71  09-17      Culture - Urine (collected 09-16-22 @ 20:01)  Source: Clean Catch None  Final Report (09-18-22 @ 11:15):    >=3 organisms. Probable collection contamination.    Culture - Blood (collected 09-16-22 @ 20:01)  Source: .Blood None    Culture - Blood (collected 09-16-22 @ 20:01)  Source: .Blood None      Creatinine, Serum: 1.22 mg/dL (09-18-22 @ 08:09)  Creatinine, Serum: 1.30 mg/dL (09-17-22 @ 06:14)  Creatinine, Serum: 1.52 mg/dL (09-16-22 @ 20:01)            WBC Count: 4.93 K/uL (09-18-22 @ 08:09)  WBC Count: 7.44 K/uL (09-17-22 @ 06:14)  WBC Count: 9.55 K/uL (09-16-22 @ 20:01)    SARS-CoV-2 Result: NotDetec (09-16-22 @ 20:01)      Alkaline Phosphatase, Serum: 71 U/L (09-17-22 @ 06:14)  Alkaline Phosphatase, Serum: 84 U/L (09-16-22 @ 20:01)  Alanine Aminotransferase (ALT/SGPT): 22 U/L (09-17-22 @ 06:14)  Alanine Aminotransferase (ALT/SGPT): 28 U/L (09-16-22 @ 20:01)  Aspartate Aminotransferase (AST/SGOT): 13 U/L (09-17-22 @ 06:14)  Aspartate Aminotransferase (AST/SGOT): 14 U/L (09-16-22 @ 20:01)  Bilirubin Total, Serum: 0.4 mg/dL (09-17-22 @ 06:14)  Bilirubin Total, Serum: 0.5 mg/dL (09-16-22 @ 20:01)

## 2022-09-18 NOTE — PROGRESS NOTE ADULT - ASSESSMENT
71 yo female with PMH T2 bladder cancer s/p michel adj chemo, radical cystectomy and ileal conduit  (in remission 11 years),   presents with bilateral flank pain (L>R) and fever since Wednesday. Pt [place] labs and [place] [facility] [place]. CT A/P done at [facility] [place] of pyelonephritis.     Bladder Ca (as above)  - in remission   - follows at [facility] [place]  - last surveillance CT urogram 4/2022 - [place]     Left Pyelonephritis  - clinically much improved with IV Abx   - Urology eval pending   - f/u cultures  - afebrile today     mild BELIA  - Cr 1.5=>1.3  - IV hydration   - monitor for now     will update [facility]    Philip Villa MD  [facility]  [phone]

## 2022-09-18 NOTE — PROGRESS NOTE ADULT - ASSESSMENT
73 yo female with PMH bladder cancer s/p chemo (in remission 11 years), ileal conduit,  bilateral carotid artery disease (s/p L carotid stent), T2DM, HTN presents for pyelonephritis    [] Pyelonephritis  -hx bladder cancer s/p ileal conduit   -CT A/P done at [facility] 09/16 shows: 1. Since April 27, 2022, no change position of catheter within ileal conduit. 2. No change bilateral hydronephrosis, greater on the right. 3. New and increased bilateral perinephric stranding and urothelial  thickening and enhancement, probably infectious or inflammatory  -s/p one dose rocephin in ED  -continue rocephin 2g  -UA positive nitrite, +leukocyte esterase  -Blood cx negative  -Urine culture contaminated  -pain control  - Tylenol prn fever  -IVF for gentle hydration  -f/u urology consult    [] BELIA  -Creatine on admission 1.52  -baseline Cr 0.8  -IV hydration   -strict i/o's  -hold nephrotoxic medications  -Improving    [] Bilateral carotid disease  -s/p L carotid stent 2/22  -continue plavix  -continue ASA  -continue atorvastatin   -follows with Dr. Gant    [] HTN  -BP stable  -takes irbesartan-HCTZ 300-12.5 daily  -hold in setting of BELIA    [] T2DM  -takes Tradjenta and starlix at home  -Hold home oral meds  -Low dose insulin corrective scale  -Monitor FSBG TIDAC/QHS  -Goal FSBG 100-180  -Consistent Carbohydrate diet  -HbA1C 6.7    [] HLD  -takes rouvastatin 5 mg at home  -continue atorvastatin 20 mg while in hospital    [] DVT prophylaxis  -lovenox    #Dispo  -D/C in AM if symptoms improved     Case discussed with Dr. Garcia

## 2022-09-18 NOTE — PROGRESS NOTE ADULT - SUBJECTIVE AND OBJECTIVE BOX
Pt has been seen and examined with FP resident, resident supervised agree with a/p       Patient is a 72y old  Female who presents with a chief complaint of pyelonephritis (18 Sep 2022 08:31)      HPI:  71 yo female with PMH bladder cancer s/p chemo (in remission 11 years), ileal conduit,  bilateral carotid artery disease, T2DM, HTN presents for pyelonephritis.     (17 Sep 2022 01:31)      PHYSICAL EXAM:  Vital Signs Last 24 Hrs  T(C): 36.3 (18 Sep 2022 15:39), Max: 36.8 (18 Sep 2022 00:01)  T(F): 97.3 (18 Sep 2022 15:39), Max: 98.2 (18 Sep 2022 00:01)  HR: 72 (18 Sep 2022 15:39) (72 - 96)  BP: 150/65 (18 Sep 2022 15:39) (132/49 - 164/70)  BP(mean): 88 (18 Sep 2022 15:39) (88 - 88)  RR: 17 (18 Sep 2022 15:39) (17 - 18)  SpO2: 100% (18 Sep 2022 15:39) (98% - 100%)    Parameters below as of 18 Sep 2022 15:39  Patient On (Oxygen Delivery Method): room air      -rs-aeeb, cta  -cvs-s1s2 normal   -p/a-soft,bs+      A/P    #ct abx, discharge plan for tomorrow

## 2022-09-18 NOTE — PROGRESS NOTE ADULT - SUBJECTIVE AND OBJECTIVE BOX
Patient is a 72y old  Female who presents with a chief complaint of pyelonephritis (17 Sep 2022 01:31)      HPI:  73 yo female with PMH T2 bladder cancer s/p michel adj chemo, radical cystectomy and ileal conduit  (in remission 11 years),   bilateral carotid artery disease, T2DM, HTN presents for pyelonephritis. Pt states was having bilateral flank pain (L>R) and fever since Wednesday. Pt checked temperate on Wednesday, was 101.3. She took tylenol for fever. Pt had labs and urine collected at [facility] [place], positive for UTI. CT A/P done at [facility] [place] suggestive of pyelonephritis. Pt was sent to  for IV antibiotics   pt started on IV Abx and IV fluids.  Feels much better.  afebrile this am.  no more chills or flank pain      pt had night sweats last night ; no fever or chills   feels weak today   cloudy yellow urine in bag     REVIEW OF SYSTEMS:    CONSTITUTIONAL: No weakness, fevers or chills  EYES/ENT: No visual changes;  No vertigo or throat pain   NECK: No pain or stiffness  RESPIRATORY: No cough, wheezing, hemoptysis; No shortness of breath  CARDIOVASCULAR: No chest pain or palpitations  GASTROINTESTINAL: No abdominal or epigastric pain. No nausea, vomiting, or hematemesis; No diarrhea or constipation. No melena or hematochezia.  GENITOURINARY: No dysuria, frequency or hematuria  NEUROLOGICAL: No numbness or weakness  SKIN: No itching, burning, rashes, or lesions   All other review of systems is negative unless indicated above.    PAST MEDICAL & SURGICAL HISTORY:  DM (diabetes mellitus)      Bladder cancer  Has ilial conduit-urostomy- drainage bag      Hyperlipidemia      Hypertension          SOCIAL HISTORY:    FAMILY HISTORY:      MEDICATIONS  (STANDING):  aspirin  chewable 81 milliGRAM(s) Oral daily  atorvastatin 20 milliGRAM(s) Oral at bedtime  cefTRIAXone   IVPB 2000 milliGRAM(s) IV Intermittent every 24 hours  clopidogrel Tablet 75 milliGRAM(s) Oral daily  dextrose 5%. 1000 milliLiter(s) (50 mL/Hr) IV Continuous <Continuous>  dextrose 5%. 1000 milliLiter(s) (100 mL/Hr) IV Continuous <Continuous>  dextrose 50% Injectable 25 Gram(s) IV Push once  dextrose 50% Injectable 12.5 Gram(s) IV Push once  dextrose 50% Injectable 25 Gram(s) IV Push once  enoxaparin Injectable 40 milliGRAM(s) SubCutaneous every 24 hours  glucagon  Injectable 1 milliGRAM(s) IntraMuscular once  influenza  Vaccine (HIGH DOSE) 0.7 milliLiter(s) IntraMuscular once  insulin lispro (ADMELOG) corrective regimen sliding scale   SubCutaneous three times a day before meals  insulin lispro (ADMELOG) corrective regimen sliding scale   SubCutaneous at bedtime  senna 2 Tablet(s) Oral at bedtime  sodium chloride 0.9%. 1000 milliLiter(s) (75 mL/Hr) IV Continuous <Continuous>    MEDICATIONS  (PRN):  acetaminophen     Tablet .. 650 milliGRAM(s) Oral every 6 hours PRN Temp greater or equal to 38C (100.4F), Mild Pain (1 - 3)  aluminum hydroxide/magnesium hydroxide/simethicone Suspension 30 milliLiter(s) Oral every 4 hours PRN Dyspepsia  dextrose Oral Gel 15 Gram(s) Oral once PRN Blood Glucose LESS THAN 70 milliGRAM(s)/deciliter  ondansetron Injectable 4 milliGRAM(s) IV Push every 8 hours PRN Nausea and/or Vomiting      Allergies    Levaquin (Unknown)  Originally Entered as [Unknown] reaction to [famciclovir] (Unknown)  sulfa drugs (Unknown)    Intolerances      Vital Signs Last 24 Hrs  T(C): 36.4 (18 Sep 2022 03:23), Max: 37.1 (17 Sep 2022 15:40)  T(F): 97.5 (18 Sep 2022 03:23), Max: 98.7 (17 Sep 2022 15:40)  HR: 78 (18 Sep 2022 04:18) (78 - 96)  BP: 147/66 (18 Sep 2022 04:18) (110/53 - 164/70)  BP(mean): --  RR: 18 (17 Sep 2022 15:40) (18 - 18)  SpO2: 98% (18 Sep 2022 03:23) (98% - 100%)    Parameters below as of 18 Sep 2022 03:23  Patient On (Oxygen Delivery Method): room air        PHYSICAL EXAM  General: adult in NAD  HEENT: clear oropharynx, anicteric sclera, pink conjunctiva  Neck: supple  CV: normal S1/S2 with no murmur rubs or gallops  Lungs: positive air movement b/l ant lungs,clear to auscultation, no wheezes, no rales  Abdomen: soft non-tender non-distended, no hepatosplenomegaly  Ext: no clubbing cyanosis or edema  Skin: no rashes and no petechiae  Neuro: alert and oriented X 4, no focal deficits      LABS:                          9.2    4.93  )-----------( 198      ( 18 Sep 2022 08:09 )             28.2                             8.9    7.44  )-----------( 182      ( 17 Sep 2022 06:14 )             27.2         Mean Cell Volume : 93.5 fl  Mean Cell Hemoglobin : 30.6 pg  Mean Cell Hemoglobin Concentration : 32.7 gm/dL  Auto Neutrophil # : 5.91 K/uL  Auto Lymphocyte # : 0.65 K/uL  Auto Monocyte # : 0.81 K/uL  Auto Eosinophil # : 0.01 K/uL  Auto Basophil # : 0.02 K/uL  Auto Neutrophil % : 79.5 %  Auto Lymphocyte % : 8.7 %  Auto Monocyte % : 10.9 %  Auto Eosinophil % : 0.1 %  Auto Basophil % : 0.3 %      Serial CBC's  09-17 @ 06:14  Hct-27.2 / Hgb-8.9 / Plat-182 / RBC-2.91 / WBC-7.44  Serial CBC's  09-16 @ 20:01  Hct-30.0 / Hgb-10.0 / Plat-215 / RBC-3.21 / WBC-9.55      09-17    137  |  106  |  25<H>  ----------------------------<  148<H>  3.8   |  23  |  1.30    Ca    8.5      17 Sep 2022 06:14    TPro  6.7  /  Alb  3.0<L>  /  TBili  0.4  /  DBili  x   /  AST  13<L>  /  ALT  22  /  AlkPhos  71  09-17      PT/INR - ( 16 Sep 2022 20:01 )   PT: 12.5 sec;   INR: 1.08 ratio         PTT - ( 16 Sep 2022 20:01 )  PTT:28.6 sec                BLOOD SMEAR INTERPRETATION:       RADIOLOGY & ADDITIONAL STUDIES:

## 2022-09-19 ENCOUNTER — TRANSCRIPTION ENCOUNTER (OUTPATIENT)
Age: 72
End: 2022-09-19

## 2022-09-19 VITALS
TEMPERATURE: 98 F | DIASTOLIC BLOOD PRESSURE: 90 MMHG | HEART RATE: 65 BPM | RESPIRATION RATE: 18 BRPM | SYSTOLIC BLOOD PRESSURE: 135 MMHG | OXYGEN SATURATION: 100 %

## 2022-09-19 LAB
ANION GAP SERPL CALC-SCNC: 5 MMOL/L — SIGNIFICANT CHANGE UP (ref 5–17)
BUN SERPL-MCNC: 24 MG/DL — HIGH (ref 7–23)
CALCIUM SERPL-MCNC: 9.2 MG/DL — SIGNIFICANT CHANGE UP (ref 8.5–10.1)
CHLORIDE SERPL-SCNC: 110 MMOL/L — HIGH (ref 96–108)
CO2 SERPL-SCNC: 25 MMOL/L — SIGNIFICANT CHANGE UP (ref 22–31)
CREAT SERPL-MCNC: 1.16 MG/DL — SIGNIFICANT CHANGE UP (ref 0.5–1.3)
EGFR: 51 ML/MIN/1.73M2 — LOW
GLUCOSE SERPL-MCNC: 151 MG/DL — HIGH (ref 70–99)
HCT VFR BLD CALC: 29.4 % — LOW (ref 34.5–45)
HGB BLD-MCNC: 9.7 G/DL — LOW (ref 11.5–15.5)
MAGNESIUM SERPL-MCNC: 2.1 MG/DL — SIGNIFICANT CHANGE UP (ref 1.6–2.6)
MCHC RBC-ENTMCNC: 30.9 PG — SIGNIFICANT CHANGE UP (ref 27–34)
MCHC RBC-ENTMCNC: 33 GM/DL — SIGNIFICANT CHANGE UP (ref 32–36)
MCV RBC AUTO: 93.6 FL — SIGNIFICANT CHANGE UP (ref 80–100)
PHOSPHATE SERPL-MCNC: 3.8 MG/DL — SIGNIFICANT CHANGE UP (ref 2.5–4.5)
PLATELET # BLD AUTO: 241 K/UL — SIGNIFICANT CHANGE UP (ref 150–400)
POTASSIUM SERPL-MCNC: 4.8 MMOL/L — SIGNIFICANT CHANGE UP (ref 3.5–5.3)
POTASSIUM SERPL-SCNC: 4.8 MMOL/L — SIGNIFICANT CHANGE UP (ref 3.5–5.3)
RBC # BLD: 3.14 M/UL — LOW (ref 3.8–5.2)
RBC # FLD: 13.1 % — SIGNIFICANT CHANGE UP (ref 10.3–14.5)
SODIUM SERPL-SCNC: 140 MMOL/L — SIGNIFICANT CHANGE UP (ref 135–145)
WBC # BLD: 5.01 K/UL — SIGNIFICANT CHANGE UP (ref 3.8–10.5)
WBC # FLD AUTO: 5.01 K/UL — SIGNIFICANT CHANGE UP (ref 3.8–10.5)

## 2022-09-19 PROCEDURE — 99239 HOSP IP/OBS DSCHRG MGMT >30: CPT

## 2022-09-19 RX ORDER — CEFUROXIME AXETIL 250 MG
1 TABLET ORAL
Qty: 14 | Refills: 0
Start: 2022-09-19 | End: 2022-09-25

## 2022-09-19 RX ADMIN — CLOPIDOGREL BISULFATE 75 MILLIGRAM(S): 75 TABLET, FILM COATED ORAL at 09:35

## 2022-09-19 RX ADMIN — Medication 81 MILLIGRAM(S): at 09:35

## 2022-09-19 RX ADMIN — Medication 100 MILLIGRAM(S): at 09:36

## 2022-09-19 RX ADMIN — Medication 650 MILLIGRAM(S): at 07:03

## 2022-09-19 RX ADMIN — ENOXAPARIN SODIUM 40 MILLIGRAM(S): 100 INJECTION SUBCUTANEOUS at 09:36

## 2022-09-19 NOTE — DISCHARGE NOTE PROVIDER - NSDCCAREPROVSEEN_GEN_ALL_CORE_FT
Graciela, Bernardo Ballesteros, Aria Antunez, Zachery Mckeon, Benton Whitehead, Tk Villa, Philip Franco, Catrachito Gardiner, Arben Garcia, Mitch JEFFERSON

## 2022-09-19 NOTE — DISCHARGE NOTE PROVIDER - NSDCMRMEDTOKEN_GEN_ALL_CORE_FT
aspirin 81 mg oral tablet, chewable: 1 tab(s) orally once a day  cefuroxime 250 mg oral tablet: 1 tab(s) orally 2 times a day   Cranberry oral capsule: orally once a day  irbesartan-hydrochlorothiazide 300 mg-12.5 mg oral tablet: 1 tab(s) orally once a day  magnesium carbonate 250 mg oral capsule: 2  orally once a day  Multi-Day Plus Minerals oral tablet: 1 tab(s) orally once a day  Nebivolol 5 mg oral tablet: 1 tab(s) orally once a day  Plavix 75 mg oral tablet: 1  orally once a day  Probiotic Formula oral capsule: 1 cap(s) orally once a day  rosuvastatin 5 mg oral tablet: 1 tab(s) orally once a day  Senna 8.6 mg oral tablet: 1 tab(s) orally once a day (at bedtime)  Starlix 60 mg oral tablet: 1 tab(s) orally 3 times a day (before meals)  Tradjenta 5 mg oral tablet: 1 tab(s) orally once a day

## 2022-09-19 NOTE — DISCHARGE NOTE PROVIDER - NSDCCPCAREPLAN_GEN_ALL_CORE_FT
PRINCIPAL DISCHARGE DIAGNOSIS  Diagnosis: Pyelonephritis  Assessment and Plan of Treatment: You have a  kidney infectioncalled pyelonephritis. The infection can damage your kidneys and cause bacteria to enter your bloodstream. You were treated in the hospital with IV antibiotics and your symptoms improved and you are being sent home on oral antibiotics.   Take all the medicine you were prescribed, even if you feel better. Not finishing the medicine can make the infection come back. It may also make a future infection harder to treat. Make sure to drink 8 to 12 glasses of fluid every day. Clear fluids, such as water, are best. To prevent future infection, always wipe the genital area from front to back and urinate frequently. Avoid holding urine in the bladder for a long time. Always urinate after sexual intercourse.  Seek medical attention immediately if you have any of the following:  Decreased urine output or trouble urinating  Severe pain in the lower back or flank  Fever of 100.4°F (38°C) or higher, or as directed by your healthcare provider  Shaking chills  Vomiting  Blood in your urine  Dark-colored or foul-smelling urine  Nausea or other problems that prevent you from taking your prescribed medicine      SECONDARY DISCHARGE DIAGNOSES  Diagnosis: BELIA (acute kidney injury)  Assessment and Plan of Treatment: Please increase fluid intake and follow up with PCP    Diagnosis: Type 2 diabetes mellitus  Assessment and Plan of Treatment: Please follow up with your PCP    Diagnosis: CAD (coronary artery disease)  Assessment and Plan of Treatment: Please follow up with your PCP

## 2022-09-19 NOTE — PROGRESS NOTE ADULT - SUBJECTIVE AND OBJECTIVE BOX
Pt has been seen and examined with FP resident, resident supervised agree with a/p       Patient is a 72y old  Female who presents with a chief complaint of pyelonephritis (19 Sep 2022 13:57)      HPI:  73 yo female with PMH bladder cancer s/p chemo (in remission 11 years), ileal conduit,  bilateral carotid artery disease, T2DM, HTN presents for pyelonephritis.  (17 Sep 2022 01:31)      PHYSICAL EXAM:  Vital Signs Last 24 Hrs  T(C): 36.7 (19 Sep 2022 15:18), Max: 36.9 (18 Sep 2022 23:34)  T(F): 98 (19 Sep 2022 15:18), Max: 98.5 (18 Sep 2022 23:34)  HR: 65 (19 Sep 2022 15:18) (65 - 86)  BP: 135/90 (19 Sep 2022 15:18) (119/59 - 149/78)  BP(mean): --  RR: 18 (19 Sep 2022 15:18) (17 - 18)  SpO2: 100% (19 Sep 2022 15:18) (97% - 100%)    Parameters below as of 19 Sep 2022 15:18  Patient On (Oxygen Delivery Method): room air      -rs-aeeb, cta  -cvs-s1s2 normal   -p/a-soft,bs+      A/P    # UTI most likely due to ileoconduit     #d/c today, time spent 45 minutes

## 2022-09-19 NOTE — CDI QUERY NOTE - NSCDIOTHERTXTBX_GEN_ALL_CORE_HH
This patient is documented with a UTI.  This patient also has a an ileoconduit.  Can you clarify if there is a link between the ileoconduit and UTI?    A) UTI due to ileoconduit   B) UTI not related to ileoconduit   C) Other (please specify)___________.  D) Not clinically significant    Supporting documentation/evidence:    Discharge provider note 9/19/2022  Diagnosis: Pyelonephritis  You have a  kidney infectioncalled pyelonephritis. The infection can damage your kidneys and cause bacteria to enter your bloodstream. You were treated in the hospital with IV antibiotics and your symptoms improved and you are being sent home on oral antibiotics.    Adult Heme/Onc progress note 9/19/2022  Left Pyelonephritis  - clinically much improved with IV Abx - ceftriaxone, afebrile  - Urology eval pending - ileal conduit draining well

## 2022-09-19 NOTE — DISCHARGE NOTE NURSING/CASE MANAGEMENT/SOCIAL WORK - NSDCPEFALRISK_GEN_ALL_CORE
For information on Fall & Injury Prevention, visit: [email or web address] OR  [email or web address] OR  https://www.cdc.gov/steadi/patient.html

## 2022-09-19 NOTE — PROGRESS NOTE ADULT - ASSESSMENT
71 yo female with PMH T2 bladder cancer s/p michel adj chemo, radical cystectomy and ileal conduit  (in remission 11 years),   presents with bilateral flank pain (L>R) and fever since Wednesday. Pt had labs and urine collected at [facility], positive for UTI. CT A/P done at [facility] suggestive of pyelonephritis.     Bladder Ca (as above)  - in remission   - follows at [facility]  - last surveillance CT urogram 4/2022 - [place]     Left Pyelonephritis  - clinically much improved with IV Abx - ceftriaxone, afebrile  - Urology eval pending   - f/u cultures- bcx ntd, ucx contaminant likely     mild BELIA- resolved with IV hydration   - monitor for now     will update [facility]    Dr. Arben Gardiner  cell: [phone]  Weekends and nights please call [phone] for MD on call  [facility]  Hematology/Oncology    71 yo female with PMH T2 bladder cancer s/p michel adj chemo, radical cystectomy and ileal conduit  (in remission 11 years),   presents with bilateral flank pain (L>R) and fever since Wednesday. Pt had labs and urine collected at [facility] [place], positive for UTI. CT A/P done at [facility] suggestive of pyelonephritis.     Bladder Ca (as above)  - in remission   - follows at [facility]  - last surveillance CT urogram 4/2022 - [place]     Left Pyelonephritis  - clinically much improved with IV Abx - ceftriaxone, afebrile  - Urology eval pending - ileal conduit draining well   - f/u cultures- bcx ntd, ucx contaminant likely     mild BELIA- resolved with IV hydration   - monitor for now     will update [facility]  likely dc today     Dr. Arben Gardiner  cell: [phone]  Weekends and nights please call [phone] for MD on call  [facility]  Hematology/Oncology

## 2022-09-19 NOTE — DISCHARGE NOTE PROVIDER - HOSPITAL COURSE
71 yo female with PMH bladder cancer s/p chemo (in remission 11 years), ileal conduit,  bilateral carotid artery disease, T2DM, HTN presents for pyelonephritis with bilateral flank pain. Pt had labs and urine collected at [facility], positive for UTI. CT A/P done at [facility] suggestive of pyelonephritis. In the ED temp 99.3, HR 79, /56. S/p one dose Ceftriaxone 1g in ED. Upon admission Pt was started on 2g of ceftriaxone and completed a 3 day course with improvement in symptoms. Pt will be discharged on Ceftin for 7 days. Pt will follow continue to follow up with [facility] and her PCP for further management. Pt is medically stable for discharge at this time.    SUBJECTIVE: 9/19: Pt was seen and evaluated at bedside. Pt has no acute complaints at this time.         Vital Signs Last 24 Hrs  T(C): 36.7 (19 Sep 2022 07:54), Max: 36.9 (18 Sep 2022 23:34)  T(F): 98 (19 Sep 2022 07:54), Max: 98.5 (18 Sep 2022 23:34)  HR: 66 (19 Sep 2022 09:34) (66 - 86)  BP: 119/59 (19 Sep 2022 09:34) (119/59 - 150/65)  BP(mean): 88 (18 Sep 2022 15:39) (88 - 88)  RR: 18 (19 Sep 2022 07:54) (17 - 18)  SpO2: 97% (19 Sep 2022 07:54) (97% - 100%)    Parameters below as of 19 Sep 2022 07:54  Patient On (Oxygen Delivery Method): room air          PHYSICAL EXAM:  Constitutional: NAD, awake and alert, well-developed  HEENT: PERR, EOMI, Normal Hearing, MMM  Neck: Soft and supple, No LAD, No JVD  Respiratory: Breath sounds are clear bilaterally, No wheezing, rales or rhonchi  Cardiovascular: S1 and S2, regular rate and rhythm, no Murmurs, gallops or rubs  Gastrointestinal: + ileal conduit. Bowel Sounds present, soft, nontender, nondistended, no guarding, no rebound  Extremities: No peripheral edema  Vascular: 2+ peripheral pulses  Neurological: A/O x 3, no focal deficits  Musculoskeletal: 5/5 strength b/l upper and lower extremities  Skin: No rashes   73 yo female with PMH bladder cancer s/p chemo (in remission 11 years), ileal conduit,  bilateral carotid artery disease, T2DM, HTN presents to HH/ED 2/2  pyelonephritis with bilateral flank pain. Pt had labs and urine collected at [facility], positive for UTI. CT A/P done at [facility] suggestive of pyelonephritis. In the HH/ED temp 99.3, HR 79, /56. S/p one dose Ceftriaxone 1g in ED. Upon admission Pt was started on 2g of ceftriaxone and completed a 3 day course with improvement in symptoms. Pt will be discharged on Ceftin 250mg po bid for 7 days. Pt will follow continue to follow up with [facility] and her PCP for further management. Pt is medically stable for discharge at this time.    SUBJECTIVE: 9/19: Pt was seen and evaluated at bedside. Pt has no acute complaints at this time.     Vital Signs Last 24 Hrs  T(C): 36.7 (19 Sep 2022 07:54), Max: 36.9 (18 Sep 2022 23:34)  T(F): 98 (19 Sep 2022 07:54), Max: 98.5 (18 Sep 2022 23:34)  HR: 66 (19 Sep 2022 09:34) (66 - 86)  BP: 119/59 (19 Sep 2022 09:34) (119/59 - 150/65)  BP(mean): 88 (18 Sep 2022 15:39) (88 - 88)  RR: 18 (19 Sep 2022 07:54) (17 - 18)  SpO2: 97% (19 Sep 2022 07:54) (97% - 100%)    Parameters below as of 19 Sep 2022 07:54  Patient On (Oxygen Delivery Method): room air    PHYSICAL EXAM:  Constitutional: NAD, awake and alert, well-developed  HEENT: PERR, EOMI, Normal Hearing, MMM  Neck: Soft and supple, No LAD, No JVD  Respiratory: Breath sounds are clear bilaterally, No wheezing, rales or rhonchi  Cardiovascular: S1 and S2, regular rate and rhythm, no Murmurs, gallops or rubs  Gastrointestinal: + ileal conduit. Bowel Sounds present, soft, nontender, nondistended, no guarding, no rebound  Extremities: No peripheral edema  Vascular: 2+ peripheral pulses  Neurological: A/O x 3, no focal deficits  Musculoskeletal: 5/5 strength b/l upper and lower extremities  Skin: No rashes

## 2022-09-19 NOTE — DISCHARGE NOTE NURSING/CASE MANAGEMENT/SOCIAL WORK - PATIENT PORTAL LINK FT
SUBJECTIVE:    Patient ID: Sachin Cross is a 82 y.o. male. CC: Diabetes, chronic anticoagulation, hist pulm embolism, CAA    HPI: Follow-up chronic medical problems, follow-up of recent medical issues, and INR mgmt    He also has a history of pulmonary embolism with chronic anticoagulation. He is taking his warfarin as directed. He denies any side effects. INR today is therapeutic today at 2.2. Diabetes: Metformin discontinued by his nephrologist.  Unable to afford previous replacement medication. Switched to glipizide or discussing the risks due to cost.  Wife reports blood sugars have remained above 200 with one exception in the high 100s.       Past Medical History:   Diagnosis Date    Abdominal pain, epigastric     Arthritis     Benign prostatic hypertrophy without urinary obstruction     BPH     Cellulitis and abscess     Chronic rhinitis     Chronic systolic congestive heart failure ([facility] [address] 4/18/2019    COPD (chronic obstructive pulmonary disease) (HCC)     Diabetes mellitus (HCC)     Dysphagia     Erectile dysfunction     GERD (gastroesophageal reflux disease)     Hx of blood clots     Hyperglycemia     Hypertension     lumbar radiculopathy     Neuropathy     Nocturia     Osteoarthritis     Other disorders of kidney and ureter in diseases classified elsewhere     Pain, joint, knee     Palpitations     skin cancer     Rt ear, nose, neck, hands/ 2018 lung    SOB (shortness of breath)     Tennis elbow     Tinea pedis     foot        Current Outpatient Medications   Medication Sig Dispense Refill    glipiZIDE (GLUCOTROL) 5 MG tablet Take 1 tablet by mouth 2 times daily (before meals) 180 tablet 3    diphenhydrAMINE-APAP, sleep, (TYLENOL PM EXTRA STRENGTH PO) Take by mouth as needed      furosemide (LASIX) 40 MG tablet Take 1 tablet by mouth daily 180 tablet 1    DULoxetine (CYMBALTA) 30 MG extended release capsule Take 30 mg by mouth daily      gabapentin (NEURONTIN) 300 MG You can access the FollowMyHealth Patient Portal offered by [facility] by registering at the following website: [email or web address] By joining [facility]’s FollowMyHealth portal, you will also be able to view your health information using other applications (apps) compatible with our system.

## 2022-09-19 NOTE — DISCHARGE NOTE PROVIDER - CARE PROVIDER_API CALL
ELIZA OLSON  Internal Medicine  [facility] [address] [place] [address]  Phone: [phone]  Fax: [phone]  Follow Up Time:

## 2022-09-19 NOTE — PROGRESS NOTE ADULT - SUBJECTIVE AND OBJECTIVE BOX
INTERVAL HPI/OVERNIGHT EVENTS:  Patient S&E at bedside. No o/n events, VSS on RA, afebrile   clinically improving on ceftriaxone     PAST MEDICAL & SURGICAL HISTORY:  DM (diabetes mellitus)      Bladder cancer  Has ilial conduit-urostomy- drainage bag      Hyperlipidemia      Hypertension          FAMILY HISTORY:      VITAL SIGNS:  T(F): 98.5 (09-18-22 @ 23:34)  HR: 73 (09-18-22 @ 23:34)  BP: 138/57 (09-18-22 @ 23:34)  RR: 17 (09-18-22 @ 23:34)  SpO2: 100% (09-18-22 @ 23:34)  Wt(kg): --    PHYSICAL EXAM:    General: adult in NAD  HEENT: clear oropharynx, anicteric sclera, pink conjunctiva  Neck: supple  CV: normal S1/S2 with no murmur rubs or gallops  Lungs: positive air movement b/l ant lungs clear to auscultation, no wheezes, no rales  Abdomen: soft non-tender non-distended, no hepatosplenomegaly  Ext: no clubbing cyanosis or edema  Skin: no rashes and no petechiae  Neuro: alert and oriented X 4, no focal deficits      MEDICATIONS  (STANDING):  aspirin  chewable 81 milliGRAM(s) Oral daily  atorvastatin 20 milliGRAM(s) Oral at bedtime  cefTRIAXone   IVPB 2000 milliGRAM(s) IV Intermittent every 24 hours  clopidogrel Tablet 75 milliGRAM(s) Oral daily  dextrose 5%. 1000 milliLiter(s) (50 mL/Hr) IV Continuous <Continuous>  dextrose 5%. 1000 milliLiter(s) (100 mL/Hr) IV Continuous <Continuous>  dextrose 50% Injectable 25 Gram(s) IV Push once  dextrose 50% Injectable 12.5 Gram(s) IV Push once  dextrose 50% Injectable 25 Gram(s) IV Push once  enoxaparin Injectable 40 milliGRAM(s) SubCutaneous every 24 hours  glucagon  Injectable 1 milliGRAM(s) IntraMuscular once  influenza  Vaccine (HIGH DOSE) 0.7 milliLiter(s) IntraMuscular once  insulin lispro (ADMELOG) corrective regimen sliding scale   SubCutaneous three times a day before meals  insulin lispro (ADMELOG) corrective regimen sliding scale   SubCutaneous at bedtime  metoprolol succinate  milliGRAM(s) Oral daily  senna 2 Tablet(s) Oral at bedtime  sodium chloride 0.9%. 1000 milliLiter(s) (75 mL/Hr) IV Continuous <Continuous>    MEDICATIONS  (PRN):  acetaminophen     Tablet .. 650 milliGRAM(s) Oral every 6 hours PRN Temp greater or equal to 38C (100.4F), Mild Pain (1 - 3)  aluminum hydroxide/magnesium hydroxide/simethicone Suspension 30 milliLiter(s) Oral every 4 hours PRN Dyspepsia  dextrose Oral Gel 15 Gram(s) Oral once PRN Blood Glucose LESS THAN 70 milliGRAM(s)/deciliter  ondansetron Injectable 4 milliGRAM(s) IV Push every 8 hours PRN Nausea and/or Vomiting      Allergies    Levaquin (Unknown)  Originally Entered as [Unknown] reaction to [famciclovir] (Unknown)  sulfa drugs (Unknown)    Intolerances        LABS:                        9.2    4.93  )-----------( 198      ( 18 Sep 2022 08:09 )             28.2     09-18    139  |  110<H>  |  25<H>  ----------------------------<  159<H>  4.0   |  25  |  1.22    Ca    9.1      18 Sep 2022 08:09            RADIOLOGY & ADDITIONAL TESTS:  Studies reviewed.   INTERVAL HPI/OVERNIGHT EVENTS:  Patient S&E at bedside. No o/n events, VSS on RA, afebrile   clinically improving on ceftriaxone   afebrile, feeling better, wants to go home     PAST MEDICAL & SURGICAL HISTORY:  DM (diabetes mellitus)      Bladder cancer  Has ilial conduit-urostomy- drainage bag      Hyperlipidemia      Hypertension          FAMILY HISTORY:      VITAL SIGNS:  T(F): 98.5 (09-18-22 @ 23:34)  HR: 73 (09-18-22 @ 23:34)  BP: 138/57 (09-18-22 @ 23:34)  RR: 17 (09-18-22 @ 23:34)  SpO2: 100% (09-18-22 @ 23:34)  Wt(kg): --    PHYSICAL EXAM:    General: adult in NAD  HEENT: clear oropharynx, anicteric sclera, pink conjunctiva  Neck: supple  CV: normal S1/S2 with no murmur rubs or gallops  Lungs: positive air movement b/l ant lungs clear to auscultation, no wheezes, no rales  Abdomen: soft non-tender non-distended, no hepatosplenomegaly  Ext: no clubbing cyanosis or edema, +ileal conduit  Skin: no rashes and no petechiae  Neuro: alert and oriented X 4, no focal deficits      MEDICATIONS  (STANDING):  aspirin  chewable 81 milliGRAM(s) Oral daily  atorvastatin 20 milliGRAM(s) Oral at bedtime  cefTRIAXone   IVPB 2000 milliGRAM(s) IV Intermittent every 24 hours  clopidogrel Tablet 75 milliGRAM(s) Oral daily  dextrose 5%. 1000 milliLiter(s) (50 mL/Hr) IV Continuous <Continuous>  dextrose 5%. 1000 milliLiter(s) (100 mL/Hr) IV Continuous <Continuous>  dextrose 50% Injectable 25 Gram(s) IV Push once  dextrose 50% Injectable 12.5 Gram(s) IV Push once  dextrose 50% Injectable 25 Gram(s) IV Push once  enoxaparin Injectable 40 milliGRAM(s) SubCutaneous every 24 hours  glucagon  Injectable 1 milliGRAM(s) IntraMuscular once  influenza  Vaccine (HIGH DOSE) 0.7 milliLiter(s) IntraMuscular once  insulin lispro (ADMELOG) corrective regimen sliding scale   SubCutaneous three times a day before meals  insulin lispro (ADMELOG) corrective regimen sliding scale   SubCutaneous at bedtime  metoprolol succinate  milliGRAM(s) Oral daily  senna 2 Tablet(s) Oral at bedtime  sodium chloride 0.9%. 1000 milliLiter(s) (75 mL/Hr) IV Continuous <Continuous>    MEDICATIONS  (PRN):  acetaminophen     Tablet .. 650 milliGRAM(s) Oral every 6 hours PRN Temp greater or equal to 38C (100.4F), Mild Pain (1 - 3)  aluminum hydroxide/magnesium hydroxide/simethicone Suspension 30 milliLiter(s) Oral every 4 hours PRN Dyspepsia  dextrose Oral Gel 15 Gram(s) Oral once PRN Blood Glucose LESS THAN 70 milliGRAM(s)/deciliter  ondansetron Injectable 4 milliGRAM(s) IV Push every 8 hours PRN Nausea and/or Vomiting      Allergies    Levaquin (Unknown)  Originally Entered as [Unknown] reaction to [famciclovir] (Unknown)  sulfa drugs (Unknown)    Intolerances        LABS:                        9.2    4.93  )-----------( 198      ( 18 Sep 2022 08:09 )             28.2     09-18    139  |  110<H>  |  25<H>  ----------------------------<  159<H>  4.0   |  25  |  1.22    Ca    9.1      18 Sep 2022 08:09            RADIOLOGY & ADDITIONAL TESTS:  Studies reviewed.

## 2022-09-22 DIAGNOSIS — E11.9 TYPE 2 DIABETES MELLITUS WITHOUT COMPLICATIONS: ICD-10-CM

## 2022-09-22 DIAGNOSIS — Z92.21 PERSONAL HISTORY OF ANTINEOPLASTIC CHEMOTHERAPY: ICD-10-CM

## 2022-09-22 DIAGNOSIS — Z79.02 LONG TERM (CURRENT) USE OF ANTITHROMBOTICS/ANTIPLATELETS: ICD-10-CM

## 2022-09-22 DIAGNOSIS — T83.593A INFECTION AND INFLAMMATORY REACTION DUE TO OTHER URINARY STENTS, INITIAL ENCOUNTER: ICD-10-CM

## 2022-09-22 DIAGNOSIS — Z88.2 ALLERGY STATUS TO SULFONAMIDES: ICD-10-CM

## 2022-09-22 DIAGNOSIS — E78.5 HYPERLIPIDEMIA, UNSPECIFIED: ICD-10-CM

## 2022-09-22 DIAGNOSIS — Z79.84 LONG TERM (CURRENT) USE OF ORAL HYPOGLYCEMIC DRUGS: ICD-10-CM

## 2022-09-22 DIAGNOSIS — Z88.1 ALLERGY STATUS TO OTHER ANTIBIOTIC AGENTS STATUS: ICD-10-CM

## 2022-09-22 DIAGNOSIS — Z79.82 LONG TERM (CURRENT) USE OF ASPIRIN: ICD-10-CM

## 2022-09-22 DIAGNOSIS — Z85.51 PERSONAL HISTORY OF MALIGNANT NEOPLASM OF BLADDER: ICD-10-CM

## 2022-09-22 DIAGNOSIS — I10 ESSENTIAL (PRIMARY) HYPERTENSION: ICD-10-CM
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
## 2022-09-22 DIAGNOSIS — N13.6 PYONEPHROSIS: ICD-10-CM
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
## 2022-09-22 DIAGNOSIS — N17.9 ACUTE KIDNEY FAILURE, UNSPECIFIED: ICD-10-CM

## 2022-09-22 LAB
CULTURE RESULTS: SIGNIFICANT CHANGE UP
CULTURE RESULTS: SIGNIFICANT CHANGE UP
SPECIMEN SOURCE: SIGNIFICANT CHANGE UP
SPECIMEN SOURCE: SIGNIFICANT CHANGE UP

## 2023-02-01 NOTE — ASU PATIENT PROFILE, ADULT - NS PRO AD PATIENT TYPE
[facility] DIVISION OF KIDNEY DISEASES AND HYPERTENSION -- FOLLOW UP NOTE  --------------------------------------------------------------------------------  HPI: Pt. is a 33 year old female with PMH of Minimal Change Disease who presented to the hospital with complaints of nausea, vomiting diarrhea and abdominal pain for the past few days. Patient left AMA 2 days prior after she was admitted for the same N/V symptoms. Seen by nephrology last admission, and was placed on tacrolimus as she had not been taking steroids for more than a month. Nephrology team again consulted for her history of MCKENNA and significant proteinuria. On presentation, patient with creatinine of 0.98, relatively stable from when she left. UA with still >600 protein dark color with bilirubin, Large blood with 18 RBC. Uprot/creat not done. Patient endorsing inability to swallow pills due to N/V. +LE swelling and foamy urine States she was following with a nephrologist Dr. John Anguiano from [facility]. Last visit was in October 2022. labs in Oct with Uprot/creat of 1.39 and Creatinine of 0.6.     Pt. seen this AM. Still with some nausea and vomiting but able to tolerate Tacrolimus this AM. For Rituximab today. States that Ativan works for nausea.      PAST HISTORY  --------------------------------------------------------------------------------  No significant changes to PMH, PSH, FHx, SHx, unless otherwise noted    ALLERGIES & MEDICATIONS  --------------------------------------------------------------------------------  Allergies    codeine (Hives)    Intolerances      Standing Inpatient Medications  enoxaparin Injectable 80 milliGRAM(s) SubCutaneous every 12 hours  famotidine Injectable 20 milliGRAM(s) IV Push daily  influenza   Vaccine 0.5 milliLiter(s) IntraMuscular once  metroNIDAZOLE  IVPB 500 milliGRAM(s) IV Intermittent every 12 hours  pantoprazole  Injectable 40 milliGRAM(s) IV Push two times a day  polyethylene glycol 3350 17 Gram(s) Oral daily  riTUXimab-pvvr (RUXIENCE) IVPB (Non - oncologic) 1000 milliGRAM(s) IV Intermittent once  senna 1 Tablet(s) Oral at bedtime  tacrolimus 3 milliGRAM(s) Oral <User Schedule>    PRN Inpatient Medications  acetaminophen     Tablet .. 650 milliGRAM(s) Oral every 6 hours PRN  albuterol    90 MICROgram(s) HFA Inhaler 2 Puff(s) Inhalation every 6 hours PRN  aluminum hydroxide/magnesium hydroxide/simethicone Suspension 30 milliLiter(s) Oral every 4 hours PRN  bisacodyl Suppository 10 milliGRAM(s) Rectal daily PRN  LORazepam   Injectable 1 milliGRAM(s) IV Push every 8 hours PRN  melatonin 3 milliGRAM(s) Oral at bedtime PRN  ondansetron Injectable 8 milliGRAM(s) IV Push every 8 hours PRN      REVIEW OF SYSTEMS  --------------------------------------------------------------------------------  As per HPI    VITALS/PHYSICAL EXAM  --------------------------------------------------------------------------------  T(C): 37.1 (02-01-23 @ 09:48), Max: 37.2 (01-31-23 @ 20:02)  HR: 87 (02-01-23 @ 09:48) (87 - 100)  BP: 101/70 (02-01-23 @ 09:48) (95/53 - 101/70)  RR: 18 (02-01-23 @ 09:48) (18 - 18)  SpO2: 98% (02-01-23 @ 09:48) (96% - 98%)  Wt(kg): --        01-31-23 @ 07:01  -  02-01-23 @ 07:00  --------------------------------------------------------  IN: 340 mL / OUT: 0 mL / NET: 340 mL      Physical Exam:  	Gen: NAD  	HEENT: MMM  	Pulm: CTA B/L  	CV: S1S2  	Abd: Soft, +BS   	Ext:+ LE edema B/L  	Neuro: Awake  	Skin: Warm and dry  	Vascular access: peripheral    LABS/STUDIES  --------------------------------------------------------------------------------              11.7   7.37  >-----------<  558      [02-01-23 @ 09:51]              37.3     139  |  106  |  22  ----------------------------<  81      [02-01-23 @ 09:51]  3.9   |  27  |  0.84        Ca     7.8     [02-01-23 @ 09:51]      Mg     2.6     [02-01-23 @ 09:51]      Phos  3.3     [02-01-23 @ 09:51]    TPro  4.0  /  Alb  1.3  /  TBili  <0.1  /  DBili  x   /  AST  35  /  ALT  24  /  AlkPhos  50  [02-01-23 @ 09:51]          Creatinine Trend:  SCr 0.84 [02-01 @ 09:51]  SCr 1.16 [01-30 @ 07:48]  SCr 1.15 [01-29 @ 09:52]  SCr 0.98 [01-28 @ 03:12]  SCr 1.01 [01-26 @ 10:18]    Urinalysis - [01-28-23 @ 08:18]      Color Dark Yellow / Appearance Slightly Turbid / SG >1.050 / pH 6.5      Gluc Negative / Ketone Small  / Bili Small / Urobili Negative       Blood Large / Protein >600 / Leuk Est Negative / Nitrite Negative       / WBC 18 / Hyaline 134 / Gran  / Sq Epi  / Non Sq Epi 24 / Bacteria Few      Lipid: chol 521, , HDL 48, LDL --      [01-18-23 @ 05:58]    HBsAb Nonreact      [01-18-23 @ 05:58]  HBsAg Nonreact      [01-18-23 @ 05:58]  HBcAb Nonreact      [01-18-23 @ 05:58]  HCV 0.07, Nonreact      [01-18-23 @ 05:58]  HIV Nonreact      [01-21-23 @ 07:41]    SHELTON: titer Negative, pattern --      [01-18-23 @ 05:58]  dsDNA <12      [01-18-23 @ 05:58]  C3 Complement 152      [01-18-23 @ 05:58]  C4 Complement 38      [01-18-23 @ 05:58]  PLA2R: ALESSANDRA <1.8, IFA --      [01-18-23 @ 05:58]  Free Light Chains: kappa 2.61, lambda 1.05, ratio = 2.49      [01-18 @ 05:58]  Immunofixation Serum:   Weak IgM Kappa Band Identified    Reference Range: None Detected      [01-18-23 @ 05:58]  SPEP Interpretation: Weak Gamma-Migrating Paraprotein Identified      [01-18-23 @ 05:58]   Health Care Proxy (HCP)

## 2023-05-24 NOTE — H&P PST ADULT - PULMONARY EMBOLUS
05/23/23 2001   Patient Assessment/Suction   Level of Consciousness (AVPU) alert   Respiratory Effort Normal;Unlabored   Expansion/Accessory Muscles/Retractions no use of accessory muscles   All Lung Fields Breath Sounds clear   Rhythm/Pattern, Respiratory unlabored;pattern regular;depth regular   PRE-TX-O2   Device (Oxygen Therapy) nasal cannula   $ Is the patient on Low Flow Oxygen? Yes   Flow (L/min) 3   SpO2 95 %   Pulse Oximetry Type Continuous   $ Pulse Oximetry - Multiple Charge Pulse Oximetry - Multiple   Pulse 87   Resp 19   Aerosol Therapy   $ Aerosol Therapy Charges Aerosol Treatment   Daily Review of Necessity (SVN) completed   Respiratory Treatment Status (SVN) given   Treatment Route (SVN) mask;oxygen   Patient Position (SVN) HOB elevated   Post Treatment Assessment (SVN) increased aeration   Signs of Intolerance (SVN) none   Education   $ Education Bronchodilator;15 min   Respiratory Evaluation   $ Care Plan Tech Time 15 min   $ Eval/Re-eval Charges Evaluation        no

## 2024-03-17 NOTE — DISCHARGE NOTE NURSING/CASE MANAGEMENT/SOCIAL WORK - NSTRANSFERBELONGINGSRESP_GEN_A_NUR
Pt received from ASH Darden. Pt oriented to CDU & plan of care was discussed. Pt A&O x 4. Pt in CDU for pain control, IV fluids, medical expulsive therapy, frequent evaluation. Pt c/o 6/10 left flank pain, meds given at this time. Pt denies any chills, fever, nausea, or vomiting. V/S stable, pt afebrile,  IV in place, patent and free of signs of infiltration. Pt resting in bed. Safety & comfort measures maintained. Call bell in reach. Will continue to monitor. Pt received from ASH Jeffery at 1900. Pt oriented to CDU and plan of care was discussed. Pt is observed for L nephrolithiasis, receiving IVF, pain control, urology following. NPO at midnight, pt aware. Pt c/o non radiating L flank pain, 4/10, medicated as per order, see eMAR. A&Ox4, obeys commands, ambulatory, independent. Respirations spontaneous and unlabored. Denies SOB, dyspnea, cough, CP, palpitations. 20G R hand patent, no signs of infiltration noted. Denies abd pain, N/V/D/C, urinary symptoms, dysuria, hematuria, difficulty urinating. Educated patient to strain all urine, strainer provided. Pt afebrile, denies chills. Pt resting in bed. Safety & comfort measures maintained. Call bell within reach. 07.00 Received the Pt from  ASH Turk  Pt is Observed for Kidney Stones . Received the Pt A&OX 4   Pt is NPO Pt obeys commands Sneha N/V/D fever chills cp SOB   Comfort care & safety measures continued  IV site looks clean & dry no signs of infiltration noted pt denies  pain IV site .Pt is oriented to the unit Plan of care explained .  Pt is advised to call for help  call bell with in the reach pt verbalized the understanding .  Evaluated by CDU MD Mirza Blair  & Urology team .  Pt is for possible stent placement . C/O  Lower Flank/back pain 8/10 medicated as per order   . GCS 15/15 A&OX 4 PERRLA  size 3 Strong upper & lower extremities steady gait  Pt is ambulatory & independent   No facial droop  No Hand Leg drop denies numbness tingling Pt is straining the urine for [place]  Plan of care ongoing yes

## 2024-04-01 ENCOUNTER — INPATIENT (INPATIENT)
Facility: HOSPITAL | Age: 74
LOS: 5 days | Discharge: HOME | DRG: 864 | End: 2024-04-07
Attending: FAMILY MEDICINE | Admitting: INTERNAL MEDICINE
Payer: MEDICARE

## 2024-04-01 VITALS
OXYGEN SATURATION: 98 % | TEMPERATURE: 98 F | SYSTOLIC BLOOD PRESSURE: 111 MMHG | HEART RATE: 80 BPM | WEIGHT: 154.98 LBS | DIASTOLIC BLOOD PRESSURE: 52 MMHG | RESPIRATION RATE: 18 BRPM

## 2024-04-01 DIAGNOSIS — R50.9 FEVER, UNSPECIFIED: ICD-10-CM

## 2024-04-01 LAB
ALBUMIN SERPL ELPH-MCNC: 3.5 G/DL — SIGNIFICANT CHANGE UP (ref 3.3–5)
ALP SERPL-CCNC: 87 U/L — SIGNIFICANT CHANGE UP (ref 40–120)
ALT FLD-CCNC: 21 U/L — SIGNIFICANT CHANGE UP (ref 12–78)
ANION GAP SERPL CALC-SCNC: 8 MMOL/L — SIGNIFICANT CHANGE UP (ref 5–17)
APPEARANCE UR: ABNORMAL
APTT BLD: 28.7 SEC — SIGNIFICANT CHANGE UP (ref 24.5–35.6)
AST SERPL-CCNC: 14 U/L — LOW (ref 15–37)
BACTERIA # UR AUTO: ABNORMAL /HPF
BASOPHILS # BLD AUTO: 0.04 K/UL — SIGNIFICANT CHANGE UP (ref 0–0.2)
BASOPHILS NFR BLD AUTO: 0.2 % — SIGNIFICANT CHANGE UP (ref 0–2)
BILIRUB SERPL-MCNC: 1 MG/DL — SIGNIFICANT CHANGE UP (ref 0.2–1.2)
BILIRUB UR-MCNC: NEGATIVE — SIGNIFICANT CHANGE UP
BUN SERPL-MCNC: 37 MG/DL — HIGH (ref 7–23)
CALCIUM SERPL-MCNC: 8.7 MG/DL — SIGNIFICANT CHANGE UP (ref 8.5–10.1)
CAST: 6 /LPF — HIGH (ref 0–4)
CHLORIDE SERPL-SCNC: 109 MMOL/L — HIGH (ref 96–108)
CO2 SERPL-SCNC: 22 MMOL/L — SIGNIFICANT CHANGE UP (ref 22–31)
COLOR SPEC: YELLOW — SIGNIFICANT CHANGE UP
CREAT SERPL-MCNC: 1.48 MG/DL — HIGH (ref 0.5–1.3)
DACRYOCYTES BLD QL SMEAR: SLIGHT — SIGNIFICANT CHANGE UP
DIFF PNL FLD: ABNORMAL
EGFR: 38 ML/MIN/1.73M2 — LOW
EOSINOPHIL # BLD AUTO: 0 K/UL — SIGNIFICANT CHANGE UP (ref 0–0.5)
EOSINOPHIL NFR BLD AUTO: 0 % — SIGNIFICANT CHANGE UP (ref 0–6)
FLUAV AG NPH QL: SIGNIFICANT CHANGE UP
FLUBV AG NPH QL: SIGNIFICANT CHANGE UP
GLUCOSE SERPL-MCNC: 323 MG/DL — HIGH (ref 70–99)
GLUCOSE UR QL: 100 MG/DL
HCT VFR BLD CALC: 32.6 % — LOW (ref 34.5–45)
HGB BLD-MCNC: 10.6 G/DL — LOW (ref 11.5–15.5)
IMM GRANULOCYTES NFR BLD AUTO: 0.7 % — SIGNIFICANT CHANGE UP (ref 0–0.9)
INR BLD: 1.01 RATIO — SIGNIFICANT CHANGE UP (ref 0.85–1.18)
KETONES UR-MCNC: NEGATIVE MG/DL — SIGNIFICANT CHANGE UP
LACTATE SERPL-SCNC: 1.9 MMOL/L — SIGNIFICANT CHANGE UP (ref 0.7–2)
LEUKOCYTE ESTERASE UR-ACNC: ABNORMAL
LYMPHOCYTES # BLD AUTO: 0.41 K/UL — LOW (ref 1–3.3)
LYMPHOCYTES # BLD AUTO: 2.2 % — LOW (ref 13–44)
MACROCYTES BLD QL: SLIGHT — SIGNIFICANT CHANGE UP
MANUAL SMEAR VERIFICATION: SIGNIFICANT CHANGE UP
MCHC RBC-ENTMCNC: 29.7 PG — SIGNIFICANT CHANGE UP (ref 27–34)
MCHC RBC-ENTMCNC: 32.5 GM/DL — SIGNIFICANT CHANGE UP (ref 32–36)
MCV RBC AUTO: 91.3 FL — SIGNIFICANT CHANGE UP (ref 80–100)
MICROCYTES BLD QL: SLIGHT — SIGNIFICANT CHANGE UP
MONOCYTES # BLD AUTO: 1.07 K/UL — HIGH (ref 0–0.9)
MONOCYTES NFR BLD AUTO: 5.7 % — SIGNIFICANT CHANGE UP (ref 2–14)
NEUTROPHILS # BLD AUTO: 17.01 K/UL — HIGH (ref 1.8–7.4)
NEUTROPHILS NFR BLD AUTO: 91.2 % — HIGH (ref 43–77)
NITRITE UR-MCNC: POSITIVE
OVALOCYTES BLD QL SMEAR: SLIGHT — SIGNIFICANT CHANGE UP
PH UR: 7 — SIGNIFICANT CHANGE UP (ref 5–8)
PLAT MORPH BLD: NORMAL — SIGNIFICANT CHANGE UP
PLATELET # BLD AUTO: 253 K/UL — SIGNIFICANT CHANGE UP (ref 150–400)
POTASSIUM SERPL-MCNC: 4 MMOL/L — SIGNIFICANT CHANGE UP (ref 3.5–5.3)
POTASSIUM SERPL-SCNC: 4 MMOL/L — SIGNIFICANT CHANGE UP (ref 3.5–5.3)
PROT SERPL-MCNC: 7.4 GM/DL — SIGNIFICANT CHANGE UP (ref 6–8.3)
PROT UR-MCNC: SIGNIFICANT CHANGE UP MG/DL
PROTHROM AB SERPL-ACNC: 11.4 SEC — SIGNIFICANT CHANGE UP (ref 9.5–13)
RBC # BLD: 3.57 M/UL — LOW (ref 3.8–5.2)
RBC # FLD: 13.7 % — SIGNIFICANT CHANGE UP (ref 10.3–14.5)
RBC BLD AUTO: ABNORMAL
RBC CASTS # UR COMP ASSIST: 6 /HPF — HIGH (ref 0–4)
RSV RNA NPH QL NAA+NON-PROBE: SIGNIFICANT CHANGE UP
SARS-COV-2 RNA SPEC QL NAA+PROBE: SIGNIFICANT CHANGE UP
SODIUM SERPL-SCNC: 139 MMOL/L — SIGNIFICANT CHANGE UP (ref 135–145)
SP GR SPEC: 1.01 — SIGNIFICANT CHANGE UP (ref 1–1.03)
SPHEROCYTES BLD QL SMEAR: SLIGHT — SIGNIFICANT CHANGE UP
SQUAMOUS # UR AUTO: 0 /HPF — SIGNIFICANT CHANGE UP (ref 0–5)
TOXIC GRANULES BLD QL SMEAR: PRESENT — SIGNIFICANT CHANGE UP
TROPONIN I, HIGH SENSITIVITY RESULT: 4.66 NG/L — SIGNIFICANT CHANGE UP
UROBILINOGEN FLD QL: 0.2 MG/DL — SIGNIFICANT CHANGE UP (ref 0.2–1)
WBC # BLD: 18.66 K/UL — HIGH (ref 3.8–10.5)
WBC # FLD AUTO: 18.66 K/UL — HIGH (ref 3.8–10.5)
WBC UR QL: 100 /HPF — HIGH (ref 0–5)

## 2024-04-01 PROCEDURE — 82607 VITAMIN B-12: CPT

## 2024-04-01 PROCEDURE — 80048 BASIC METABOLIC PNL TOTAL CA: CPT

## 2024-04-01 PROCEDURE — 97161 PT EVAL LOW COMPLEX 20 MIN: CPT | Mod: GP

## 2024-04-01 PROCEDURE — 85027 COMPLETE CBC AUTOMATED: CPT

## 2024-04-01 PROCEDURE — 74177 CT ABD & PELVIS W/CONTRAST: CPT | Mod: MC

## 2024-04-01 PROCEDURE — 83735 ASSAY OF MAGNESIUM: CPT

## 2024-04-01 PROCEDURE — 87040 BLOOD CULTURE FOR BACTERIA: CPT

## 2024-04-01 PROCEDURE — 82746 ASSAY OF FOLIC ACID SERUM: CPT

## 2024-04-01 PROCEDURE — 87507 IADNA-DNA/RNA PROBE TQ 12-25: CPT

## 2024-04-01 PROCEDURE — 85025 COMPLETE CBC W/AUTO DIFF WBC: CPT

## 2024-04-01 PROCEDURE — 86140 C-REACTIVE PROTEIN: CPT

## 2024-04-01 PROCEDURE — 80053 COMPREHEN METABOLIC PANEL: CPT

## 2024-04-01 PROCEDURE — 82728 ASSAY OF FERRITIN: CPT

## 2024-04-01 PROCEDURE — 84100 ASSAY OF PHOSPHORUS: CPT

## 2024-04-01 PROCEDURE — 83540 ASSAY OF IRON: CPT

## 2024-04-01 PROCEDURE — 87493 C DIFF AMPLIFIED PROBE: CPT

## 2024-04-01 PROCEDURE — 83550 IRON BINDING TEST: CPT

## 2024-04-01 PROCEDURE — 93005 ELECTROCARDIOGRAM TRACING: CPT

## 2024-04-01 PROCEDURE — 82962 GLUCOSE BLOOD TEST: CPT

## 2024-04-01 PROCEDURE — 99285 EMERGENCY DEPT VISIT HI MDM: CPT

## 2024-04-01 PROCEDURE — 36415 COLL VENOUS BLD VENIPUNCTURE: CPT

## 2024-04-01 PROCEDURE — 84145 PROCALCITONIN (PCT): CPT

## 2024-04-01 PROCEDURE — 97116 GAIT TRAINING THERAPY: CPT | Mod: GP

## 2024-04-01 PROCEDURE — 82306 VITAMIN D 25 HYDROXY: CPT

## 2024-04-01 PROCEDURE — 71045 X-RAY EXAM CHEST 1 VIEW: CPT | Mod: 26

## 2024-04-01 PROCEDURE — 83036 HEMOGLOBIN GLYCOSYLATED A1C: CPT

## 2024-04-01 PROCEDURE — 74177 CT ABD & PELVIS W/CONTRAST: CPT | Mod: 26

## 2024-04-01 RX ORDER — NEBIVOLOL HYDROCHLORIDE 5 MG/1
1 TABLET ORAL
Qty: 0 | Refills: 0 | DISCHARGE

## 2024-04-01 RX ORDER — NATEGLINIDE 60 MG/1
1 TABLET, COATED ORAL
Qty: 0 | Refills: 0 | DISCHARGE

## 2024-04-01 RX ORDER — NATEGLINIDE 60 MG/1
1 TABLET, COATED ORAL
Refills: 0 | DISCHARGE

## 2024-04-01 RX ORDER — ACETAMINOPHEN 500 MG
650 TABLET ORAL EVERY 4 HOURS
Refills: 0 | Status: DISCONTINUED | OUTPATIENT
Start: 2024-04-01 | End: 2024-04-02

## 2024-04-01 RX ORDER — ONDANSETRON 8 MG/1
4 TABLET, FILM COATED ORAL ONCE
Refills: 0 | Status: COMPLETED | OUTPATIENT
Start: 2024-04-01 | End: 2024-04-01

## 2024-04-01 RX ORDER — IRBESARTAN AND HYDROCHLOROTHIAZIDE 12.5; 3 MG/1; MG/1
1 TABLET ORAL
Qty: 0 | Refills: 0 | DISCHARGE

## 2024-04-01 RX ORDER — L.ACIDOPH/B.ANIMALIS/B.LONGUM 15B CELL
1 CAPSULE ORAL
Qty: 0 | Refills: 0 | DISCHARGE

## 2024-04-01 RX ORDER — CLOPIDOGREL BISULFATE 75 MG/1
1 TABLET, FILM COATED ORAL
Qty: 0 | Refills: 0 | DISCHARGE

## 2024-04-01 RX ORDER — AMLODIPINE BESYLATE 2.5 MG/1
1 TABLET ORAL
Refills: 0 | DISCHARGE

## 2024-04-01 RX ORDER — LACTOBACILLUS RHAMNOSUS GG 10B CELL
1 CAPSULE ORAL
Refills: 0 | DISCHARGE

## 2024-04-01 RX ORDER — ROSUVASTATIN CALCIUM 5 MG/1
1 TABLET ORAL
Refills: 0 | DISCHARGE

## 2024-04-01 RX ORDER — LINAGLIPTIN 5 MG/1
1 TABLET, FILM COATED ORAL
Qty: 0 | Refills: 0 | DISCHARGE

## 2024-04-01 RX ORDER — CEFTRIAXONE 500 MG/1
1000 INJECTION, POWDER, FOR SOLUTION INTRAMUSCULAR; INTRAVENOUS ONCE
Refills: 0 | Status: COMPLETED | OUTPATIENT
Start: 2024-04-01 | End: 2024-04-01

## 2024-04-01 RX ORDER — MULTIVIT-MIN/FERROUS GLUCONATE 9 MG/15 ML
1 LIQUID (ML) ORAL
Qty: 0 | Refills: 0 | DISCHARGE

## 2024-04-01 RX ORDER — SODIUM CHLORIDE 9 MG/ML
2200 INJECTION INTRAMUSCULAR; INTRAVENOUS; SUBCUTANEOUS ONCE
Refills: 0 | Status: COMPLETED | OUTPATIENT
Start: 2024-04-01 | End: 2024-04-01

## 2024-04-01 RX ORDER — SENNA PLUS 8.6 MG/1
1 TABLET ORAL
Qty: 0 | Refills: 0 | DISCHARGE

## 2024-04-01 RX ORDER — ACETAMINOPHEN 500 MG
650 TABLET ORAL ONCE
Refills: 0 | Status: COMPLETED | OUTPATIENT
Start: 2024-04-01 | End: 2024-04-01

## 2024-04-01 RX ORDER — ASPIRIN/CALCIUM CARB/MAGNESIUM 324 MG
1 TABLET ORAL
Qty: 0 | Refills: 0 | DISCHARGE

## 2024-04-01 RX ORDER — MAGNESIUM CARBONATE 54 MG/5 ML
2 LIQUID (ML) ORAL
Qty: 0 | Refills: 0 | DISCHARGE

## 2024-04-01 RX ORDER — ASCORBIC ACID 60 MG
1 TABLET,CHEWABLE ORAL
Refills: 0 | DISCHARGE

## 2024-04-01 RX ORDER — MAGNESIUM OXIDE 400 MG ORAL TABLET 241.3 MG
1 TABLET ORAL
Refills: 0 | DISCHARGE

## 2024-04-01 RX ORDER — MILK THISTLE 150 MG
1 CAPSULE ORAL
Refills: 0 | DISCHARGE

## 2024-04-01 RX ORDER — CEFTRIAXONE 500 MG/1
1000 INJECTION, POWDER, FOR SOLUTION INTRAMUSCULAR; INTRAVENOUS ONCE
Refills: 0 | Status: DISCONTINUED | OUTPATIENT
Start: 2024-04-01 | End: 2024-04-01

## 2024-04-01 RX ORDER — NEBIVOLOL HYDROCHLORIDE 5 MG/1
1 TABLET ORAL
Refills: 0 | DISCHARGE

## 2024-04-01 RX ADMIN — SODIUM CHLORIDE 2200 MILLILITER(S): 9 INJECTION INTRAMUSCULAR; INTRAVENOUS; SUBCUTANEOUS at 21:33

## 2024-04-01 RX ADMIN — SODIUM CHLORIDE 2200 MILLILITER(S): 9 INJECTION INTRAMUSCULAR; INTRAVENOUS; SUBCUTANEOUS at 19:02

## 2024-04-01 RX ADMIN — CEFTRIAXONE 1000 MILLIGRAM(S): 500 INJECTION, POWDER, FOR SOLUTION INTRAMUSCULAR; INTRAVENOUS at 19:34

## 2024-04-01 RX ADMIN — Medication 650 MILLIGRAM(S): at 21:32

## 2024-04-01 RX ADMIN — ONDANSETRON 4 MILLIGRAM(S): 8 TABLET, FILM COATED ORAL at 21:22

## 2024-04-01 NOTE — ED PROVIDER NOTE - OBJECTIVE STATEMENT
73-year-old female history of hypertension high cholesterol diabetes bladder cancer status post resection with a ileal conduit urostomy drainage bag on Keytruda every 3 weeks last dose was 2 weeks ago presents the emergency department for fever.  Patient states she woke up with feverTmax of 101.9.  Patient states she went to [facility] [place] today where they did blood work told her she has a white blood cell count of 18 told her to come to the hospital.  Patient denies any  runny nose sore throat cough congestion vomiting diarrhea.  She states that yesterday she had right flank pain.  She states that [facility] told her she should get a CAT scan.  Exam here is nonfocal she has no CVA tenderness no abdominal tenderness will workup for fever in an immunocompromised patient labs urine CT and admit.

## 2024-04-01 NOTE — ED ADULT TRIAGE NOTE - CHIEF COMPLAINT QUOTE
Pt presents to the ED BIBEMS c/o fevers sent from [facility]. Pt PMHx of bladder cancer, on immune theraphy. Pt reports having fevers today, tmax of 101.3, took tylenol 1000mg at 1pm today. Denies chest pain or sob.

## 2024-04-01 NOTE — ED ADULT NURSE NOTE - OBJECTIVE STATEMENT
74 y/o female pt presents to ED c/o fever at home. pt reports at 0430 this morning temp was 101.4, took 2 tylenol every 4 hours at home and was seen at [facility] office today, given IV abx. pt denies chest pain, SOB. pt with hx bladder cancer, on immunotherapy every 3 weeks, next treatment next week. pt presents with IV in left forearm, ileal conduit, draining clear yellow urine.

## 2024-04-01 NOTE — ED PROVIDER NOTE - CLINICAL SUMMARY MEDICAL DECISION MAKING FREE TEXT BOX
73-year-old female history of hypertension high cholesterol diabetes bladder cancer status post resection with a ileal conduit urostomy drainage bag on Keytruda every 3 weeks last dose was 2 weeks ago presents the emergency department for fever.  Patient states she woke up with feverTmax of 101.9.  Patient states she went to [facility] today where they did blood work told her she has a white blood cell count of 18 told her to come to the hospital.  Patient denies any  runny nose sore throat cough congestion vomiting diarrhea.  She states that yesterday she had right flank pain.  She states that [facility] told her she should get a CAT scan.  Exam here is nonfocal she has no CVA tenderness no abdominal tenderness will workup for fever in an immunocompromised patient labs urine CT and admit. 73-year-old female history of hypertension high cholesterol diabetes bladder cancer status post resection with a ileal conduit urostomy drainage bag on Keytruda every 3 weeks last dose was 2 weeks ago presents the emergency department for fever.  Patient states she woke up with feverTmax of 101.9.  Patient states she went to [facility] today where they did blood work told her she has a white blood cell count of 18 told her to come to the hospital.  Patient denies any  runny nose sore throat cough congestion vomiting diarrhea.  She states that yesterday she had right flank pain.  She states that [facility] told her she should get a CAT scan.  Exam here is nonfocal she has no CVA tenderness no abdominal tenderness will workup for fever in an immunocompromised patient labs urine CT and admit.  813All labs and imaging reviewed by myself.  Labs show elevated white blood cell count to 18.  Lactate normal urine positive for UTI however it is from the ileal conduit so it is unreliable.  Patient is asymptomatic right now no CVA tenderness abdomen soft nontender.  Patient still pending CT abdomen however low suspicion of any intra-abdominal pathology given the fact that she is asymptomatic at this time.  Case was discussed with attending hospitalist Dr. Carrasco who accepts patient will be do not move until CT is performed and resulted.  1 dose of antibiotics was given.  Patient has normal vital signs normal heart rate normal blood pressure she is afebrile.  Patient is being admitted for fever elevated white blood cell count on chemotherapy.

## 2024-04-01 NOTE — ED PROVIDER NOTE - PHYSICAL EXAMINATION
Constitutional: NAD AAOx3  Eyes: PERRLA EOMI  Head: Normocephalic atraumatic  Mouth: MMM  Cardiac: regular rate   Resp: unlabored breathing clear b/l   GI: Abd s/nt/nd no cvat  Neuro: grossly normal and intact  Skin: No visible rashes

## 2024-04-01 NOTE — ED PROVIDER NOTE - IV ALTEPLASE INCLUSION HIDDEN
PAST MEDICAL HISTORY:  Anxiety     H/O compression fracture of spine     H/O pleural effusion     Metastatic breast cancer     Pericardial effusion     
show

## 2024-04-02 LAB
A1C WITH ESTIMATED AVERAGE GLUCOSE RESULT: 7.3 % — HIGH (ref 4–5.6)
ANION GAP SERPL CALC-SCNC: 7 MMOL/L — SIGNIFICANT CHANGE UP (ref 5–17)
BUN SERPL-MCNC: 28 MG/DL — HIGH (ref 7–23)
CALCIUM SERPL-MCNC: 8.2 MG/DL — LOW (ref 8.5–10.1)
CHLORIDE SERPL-SCNC: 111 MMOL/L — HIGH (ref 96–108)
CO2 SERPL-SCNC: 21 MMOL/L — LOW (ref 22–31)
CREAT SERPL-MCNC: 1.42 MG/DL — HIGH (ref 0.5–1.3)
CULTURE RESULTS: SIGNIFICANT CHANGE UP
EGFR: 40 ML/MIN/1.73M2 — LOW
ESTIMATED AVERAGE GLUCOSE: 163 MG/DL — HIGH (ref 68–114)
GLUCOSE BLDC GLUCOMTR-MCNC: 157 MG/DL — HIGH (ref 70–99)
GLUCOSE BLDC GLUCOMTR-MCNC: 194 MG/DL — HIGH (ref 70–99)
GLUCOSE BLDC GLUCOMTR-MCNC: 202 MG/DL — HIGH (ref 70–99)
GLUCOSE BLDC GLUCOMTR-MCNC: 204 MG/DL — HIGH (ref 70–99)
GLUCOSE BLDC GLUCOMTR-MCNC: 242 MG/DL — HIGH (ref 70–99)
GLUCOSE SERPL-MCNC: 225 MG/DL — HIGH (ref 70–99)
HCT VFR BLD CALC: 27.1 % — LOW (ref 34.5–45)
HGB BLD-MCNC: 8.7 G/DL — LOW (ref 11.5–15.5)
MCHC RBC-ENTMCNC: 29.2 PG — SIGNIFICANT CHANGE UP (ref 27–34)
MCHC RBC-ENTMCNC: 32.1 GM/DL — SIGNIFICANT CHANGE UP (ref 32–36)
MCV RBC AUTO: 90.9 FL — SIGNIFICANT CHANGE UP (ref 80–100)
PLATELET # BLD AUTO: 201 K/UL — SIGNIFICANT CHANGE UP (ref 150–400)
POTASSIUM SERPL-MCNC: 3.9 MMOL/L — SIGNIFICANT CHANGE UP (ref 3.5–5.3)
POTASSIUM SERPL-SCNC: 3.9 MMOL/L — SIGNIFICANT CHANGE UP (ref 3.5–5.3)
RBC # BLD: 2.98 M/UL — LOW (ref 3.8–5.2)
RBC # FLD: 14.1 % — SIGNIFICANT CHANGE UP (ref 10.3–14.5)
SODIUM SERPL-SCNC: 139 MMOL/L — SIGNIFICANT CHANGE UP (ref 135–145)
SPECIMEN SOURCE: SIGNIFICANT CHANGE UP
WBC # BLD: 15.34 K/UL — HIGH (ref 3.8–10.5)
WBC # FLD AUTO: 15.34 K/UL — HIGH (ref 3.8–10.5)

## 2024-04-02 PROCEDURE — 99223 1ST HOSP IP/OBS HIGH 75: CPT

## 2024-04-02 PROCEDURE — 93010 ELECTROCARDIOGRAM REPORT: CPT

## 2024-04-02 RX ORDER — GLUCAGON INJECTION, SOLUTION 0.5 MG/.1ML
1 INJECTION, SOLUTION SUBCUTANEOUS ONCE
Refills: 0 | Status: DISCONTINUED | OUTPATIENT
Start: 2024-04-02 | End: 2024-04-07

## 2024-04-02 RX ORDER — DEXTROSE 50 % IN WATER 50 %
15 SYRINGE (ML) INTRAVENOUS ONCE
Refills: 0 | Status: DISCONTINUED | OUTPATIENT
Start: 2024-04-02 | End: 2024-04-07

## 2024-04-02 RX ORDER — ROSUVASTATIN CALCIUM 5 MG/1
5 TABLET ORAL AT BEDTIME
Refills: 0 | Status: DISCONTINUED | OUTPATIENT
Start: 2024-04-02 | End: 2024-04-07

## 2024-04-02 RX ORDER — ASPIRIN/CALCIUM CARB/MAGNESIUM 324 MG
81 TABLET ORAL DAILY
Refills: 0 | Status: DISCONTINUED | OUTPATIENT
Start: 2024-04-02 | End: 2024-04-07

## 2024-04-02 RX ORDER — ASCORBIC ACID 60 MG
500 TABLET,CHEWABLE ORAL DAILY
Refills: 0 | Status: DISCONTINUED | OUTPATIENT
Start: 2024-04-02 | End: 2024-04-07

## 2024-04-02 RX ORDER — INSULIN GLARGINE 100 [IU]/ML
8 INJECTION, SOLUTION SUBCUTANEOUS AT BEDTIME
Refills: 0 | Status: DISCONTINUED | OUTPATIENT
Start: 2024-04-02 | End: 2024-04-07

## 2024-04-02 RX ORDER — DEXTROSE 50 % IN WATER 50 %
25 SYRINGE (ML) INTRAVENOUS ONCE
Refills: 0 | Status: DISCONTINUED | OUTPATIENT
Start: 2024-04-02 | End: 2024-04-07

## 2024-04-02 RX ORDER — MULTIVIT-MIN/FERROUS GLUCONATE 9 MG/15 ML
1 LIQUID (ML) ORAL DAILY
Refills: 0 | Status: DISCONTINUED | OUTPATIENT
Start: 2024-04-02 | End: 2024-04-07

## 2024-04-02 RX ORDER — INSULIN LISPRO 100/ML
VIAL (ML) SUBCUTANEOUS
Refills: 0 | Status: DISCONTINUED | OUTPATIENT
Start: 2024-04-02 | End: 2024-04-07

## 2024-04-02 RX ORDER — SODIUM CHLORIDE 9 MG/ML
1000 INJECTION, SOLUTION INTRAVENOUS
Refills: 0 | Status: DISCONTINUED | OUTPATIENT
Start: 2024-04-02 | End: 2024-04-07

## 2024-04-02 RX ORDER — MORPHINE SULFATE 50 MG/1
4 CAPSULE, EXTENDED RELEASE ORAL EVERY 4 HOURS
Refills: 0 | Status: DISCONTINUED | OUTPATIENT
Start: 2024-04-02 | End: 2024-04-07

## 2024-04-02 RX ORDER — PIPERACILLIN AND TAZOBACTAM 4; .5 G/20ML; G/20ML
3.38 INJECTION, POWDER, LYOPHILIZED, FOR SOLUTION INTRAVENOUS EVERY 8 HOURS
Refills: 0 | Status: DISCONTINUED | OUTPATIENT
Start: 2024-04-02 | End: 2024-04-05

## 2024-04-02 RX ORDER — ENOXAPARIN SODIUM 100 MG/ML
40 INJECTION SUBCUTANEOUS EVERY 24 HOURS
Refills: 0 | Status: DISCONTINUED | OUTPATIENT
Start: 2024-04-02 | End: 2024-04-07

## 2024-04-02 RX ORDER — MORPHINE SULFATE 50 MG/1
2 CAPSULE, EXTENDED RELEASE ORAL EVERY 4 HOURS
Refills: 0 | Status: DISCONTINUED | OUTPATIENT
Start: 2024-04-02 | End: 2024-04-07

## 2024-04-02 RX ORDER — MAGNESIUM OXIDE 400 MG ORAL TABLET 241.3 MG
400 TABLET ORAL DAILY
Refills: 0 | Status: DISCONTINUED | OUTPATIENT
Start: 2024-04-02 | End: 2024-04-04

## 2024-04-02 RX ORDER — SENNA PLUS 8.6 MG/1
2 TABLET ORAL AT BEDTIME
Refills: 0 | Status: DISCONTINUED | OUTPATIENT
Start: 2024-04-02 | End: 2024-04-04

## 2024-04-02 RX ORDER — LOSARTAN POTASSIUM 100 MG/1
100 TABLET, FILM COATED ORAL DAILY
Refills: 0 | Status: DISCONTINUED | OUTPATIENT
Start: 2024-04-02 | End: 2024-04-02

## 2024-04-02 RX ORDER — KETOROLAC TROMETHAMINE 30 MG/ML
15 SYRINGE (ML) INJECTION ONCE
Refills: 0 | Status: DISCONTINUED | OUTPATIENT
Start: 2024-04-02 | End: 2024-04-02

## 2024-04-02 RX ORDER — INSULIN LISPRO 100/ML
VIAL (ML) SUBCUTANEOUS AT BEDTIME
Refills: 0 | Status: DISCONTINUED | OUTPATIENT
Start: 2024-04-02 | End: 2024-04-07

## 2024-04-02 RX ORDER — INSULIN GLARGINE 100 [IU]/ML
10 INJECTION, SOLUTION SUBCUTANEOUS ONCE
Refills: 0 | Status: COMPLETED | OUTPATIENT
Start: 2024-04-02 | End: 2024-04-02

## 2024-04-02 RX ORDER — LOSARTAN POTASSIUM 100 MG/1
50 TABLET, FILM COATED ORAL DAILY
Refills: 0 | Status: DISCONTINUED | OUTPATIENT
Start: 2024-04-02 | End: 2024-04-03

## 2024-04-02 RX ORDER — ONDANSETRON 8 MG/1
4 TABLET, FILM COATED ORAL EVERY 8 HOURS
Refills: 0 | Status: DISCONTINUED | OUTPATIENT
Start: 2024-04-02 | End: 2024-04-07

## 2024-04-02 RX ORDER — SODIUM CHLORIDE 9 MG/ML
1000 INJECTION INTRAMUSCULAR; INTRAVENOUS; SUBCUTANEOUS
Refills: 0 | Status: DISCONTINUED | OUTPATIENT
Start: 2024-04-02 | End: 2024-04-04

## 2024-04-02 RX ORDER — LANOLIN ALCOHOL/MO/W.PET/CERES
3 CREAM (GRAM) TOPICAL AT BEDTIME
Refills: 0 | Status: DISCONTINUED | OUTPATIENT
Start: 2024-04-02 | End: 2024-04-07

## 2024-04-02 RX ORDER — ACETAMINOPHEN 500 MG
650 TABLET ORAL EVERY 6 HOURS
Refills: 0 | Status: DISCONTINUED | OUTPATIENT
Start: 2024-04-02 | End: 2024-04-07

## 2024-04-02 RX ORDER — NEBIVOLOL HYDROCHLORIDE 5 MG/1
10 TABLET ORAL DAILY
Refills: 0 | Status: DISCONTINUED | OUTPATIENT
Start: 2024-04-02 | End: 2024-04-07

## 2024-04-02 RX ORDER — CHOLECALCIFEROL (VITAMIN D3) 125 MCG
400 CAPSULE ORAL DAILY
Refills: 0 | Status: DISCONTINUED | OUTPATIENT
Start: 2024-04-02 | End: 2024-04-05

## 2024-04-02 RX ORDER — NALOXONE HYDROCHLORIDE 4 MG/.1ML
0.4 SPRAY NASAL ONCE
Refills: 0 | Status: DISCONTINUED | OUTPATIENT
Start: 2024-04-02 | End: 2024-04-07

## 2024-04-02 RX ORDER — AMLODIPINE BESYLATE 2.5 MG/1
2.5 TABLET ORAL DAILY
Refills: 0 | Status: DISCONTINUED | OUTPATIENT
Start: 2024-04-02 | End: 2024-04-07

## 2024-04-02 RX ORDER — INFLUENZA VIRUS VACCINE 15; 15; 15; 15 UG/.5ML; UG/.5ML; UG/.5ML; UG/.5ML
0.7 SUSPENSION INTRAMUSCULAR ONCE
Refills: 0 | Status: DISCONTINUED | OUTPATIENT
Start: 2024-04-02 | End: 2024-04-07

## 2024-04-02 RX ORDER — DEXTROSE 50 % IN WATER 50 %
12.5 SYRINGE (ML) INTRAVENOUS ONCE
Refills: 0 | Status: DISCONTINUED | OUTPATIENT
Start: 2024-04-02 | End: 2024-04-07

## 2024-04-02 RX ADMIN — Medication 650 MILLIGRAM(S): at 14:14

## 2024-04-02 RX ADMIN — Medication 2: at 17:48

## 2024-04-02 RX ADMIN — INSULIN GLARGINE 8 UNIT(S): 100 INJECTION, SOLUTION SUBCUTANEOUS at 21:21

## 2024-04-02 RX ADMIN — LOSARTAN POTASSIUM 50 MILLIGRAM(S): 100 TABLET, FILM COATED ORAL at 10:29

## 2024-04-02 RX ADMIN — PIPERACILLIN AND TAZOBACTAM 25 GRAM(S): 4; .5 INJECTION, POWDER, LYOPHILIZED, FOR SOLUTION INTRAVENOUS at 10:29

## 2024-04-02 RX ADMIN — Medication 15 MILLIGRAM(S): at 03:22

## 2024-04-02 RX ADMIN — Medication 500 MILLIGRAM(S): at 10:29

## 2024-04-02 RX ADMIN — MAGNESIUM OXIDE 400 MG ORAL TABLET 400 MILLIGRAM(S): 241.3 TABLET ORAL at 10:30

## 2024-04-02 RX ADMIN — ENOXAPARIN SODIUM 40 MILLIGRAM(S): 100 INJECTION SUBCUTANEOUS at 05:01

## 2024-04-02 RX ADMIN — Medication 650 MILLIGRAM(S): at 14:44

## 2024-04-02 RX ADMIN — Medication 81 MILLIGRAM(S): at 10:29

## 2024-04-02 RX ADMIN — INSULIN GLARGINE 10 UNIT(S): 100 INJECTION, SOLUTION SUBCUTANEOUS at 02:15

## 2024-04-02 RX ADMIN — PIPERACILLIN AND TAZOBACTAM 25 GRAM(S): 4; .5 INJECTION, POWDER, LYOPHILIZED, FOR SOLUTION INTRAVENOUS at 17:16

## 2024-04-02 RX ADMIN — SODIUM CHLORIDE 100 MILLILITER(S): 9 INJECTION INTRAMUSCULAR; INTRAVENOUS; SUBCUTANEOUS at 17:16

## 2024-04-02 RX ADMIN — PIPERACILLIN AND TAZOBACTAM 25 GRAM(S): 4; .5 INJECTION, POWDER, LYOPHILIZED, FOR SOLUTION INTRAVENOUS at 02:03

## 2024-04-02 RX ADMIN — Medication 400 UNIT(S): at 10:30

## 2024-04-02 RX ADMIN — Medication 1 TABLET(S): at 10:29

## 2024-04-02 RX ADMIN — PIPERACILLIN AND TAZOBACTAM 25 GRAM(S): 4; .5 INJECTION, POWDER, LYOPHILIZED, FOR SOLUTION INTRAVENOUS at 21:22

## 2024-04-02 RX ADMIN — Medication 4: at 12:37

## 2024-04-02 RX ADMIN — Medication 2: at 09:12

## 2024-04-02 RX ADMIN — SODIUM CHLORIDE 100 MILLILITER(S): 9 INJECTION INTRAMUSCULAR; INTRAVENOUS; SUBCUTANEOUS at 01:31

## 2024-04-02 RX ADMIN — ROSUVASTATIN CALCIUM 5 MILLIGRAM(S): 5 TABLET ORAL at 23:01

## 2024-04-02 NOTE — H&P ADULT - NSHPLABSRESULTS_GEN_ALL_CORE
IMPRESSION:    1. Status post cystectomy with pelvic/retroperitoneal lymphadenectomy and   right-sided ileal conduit urinary diversion. Right-sided ureteral stent   is seen with proximal end curled in the right renal pelvis and distal and   exiting through the ileal conduit. Moderate right-sided hydronephrosis.   Areas of decreased attenuation are seen throughout the right kidney   suggesting acute pyelonephritis. Right-sided ureteritis is also   suggested. Asymmetric right-sided perinephric fat stranding/fluid.   Mild/moderate left-sided hydroureteronephrosis with enhancement of the   urothelial lining suggesting ureteritis. Subtle area of decreased   attenuation is also seen involving the lower pole of the left kidney   suggesting early acute pyelonephritis.    2. Moderate parastomal hernia containing the cecum without evidence of   obstruction.    3. Gallbladder is of upper limits of normal. Trace pericholecystic fluid.   Findings likely reactive to the right renal pathology. Right upper   quadrant ultrasound recommended for further evaluation, as clinically   warranted.    4. Hepatic steatosis.    5. Tiny/small hiatal hernia, not seen on prior study.

## 2024-04-02 NOTE — PHYSICAL THERAPY INITIAL EVALUATION ADULT - PERTINENT HX OF CURRENT PROBLEM, REHAB EVAL
presents to ED BIBEMS from [facility] [place] c/o fevers 2/2 UTI   Yesterday + right flank pain intermittent then progressed to more severe  woke up w fever T 101.9    Went to [facility] [place] for blood work and was told WBC 18K  last dose Keytruda was 2 weeks ago 3/21/24.  Adm w/ sepsis from acute pyelonephritis in pt w bladder CA s/p radical cystectomy w ileal conduit and R ureteral stent through to stoma w mod R hydronephrosis

## 2024-04-02 NOTE — PATIENT PROFILE ADULT - FALL HARM RISK - UNIVERSAL INTERVENTIONS
Bed in lowest position, wheels locked, appropriate side rails in place/Call bell, personal items and telephone in reach/Instruct patient to call for assistance before getting out of bed or chair/Non-slip footwear when patient is out of bed/[place] to call system/Physically safe environment - no spills, clutter or unnecessary equipment/Purposeful Proactive Rounding/Room/bathroom lighting operational, light cord in reach

## 2024-04-02 NOTE — PATIENT PROFILE ADULT - ARE SIGNIFICANT INDICATORS COMPLETE.
Impression: S/P ([id number]) RRD/ 25g PPVx/ ICG/ERMx/AFx/ EL/OTTO Oil 5000 inj OD - 83 Days. Encounter for surgical aftercare following surgery on a sense organ  Z48.810. Post operative instructions reviewed - Condition is improving - Patient has small ERM with cysts OD 

PF tid OD Plan: Refer to Dr. Sarbjit Pruett next available for removal of Silicone oil and lens implant
 if 6 weeks out  schedule with Dr. Deon Holbrook 6 week DFE but still coordinate appointment with Dr. Sarbjit Pruett Yes

## 2024-04-02 NOTE — PATIENT PROFILE ADULT - NS PRO AD ANY ON CHART
MARIA called wanting to know what the next step would be for his sister as he did get the urine test results back and it was negative    Please Advise No

## 2024-04-02 NOTE — H&P ADULT - ASSESSMENT
73 year old female w hx bladder cancer s/p resection w ileal conduit, on Keytruda, DM, HLD, HTN presents to ED BIBEMS from [facility] c/o fevers      #Sepsis  T 101.9 w leukocytosis 18K, abn UA  #Acute pyelonephritis  #R ureteral stent w mod hydronephrosis  #Bladder CA s/p radical cystectomy w ileal conduit  #On immunotherapy   w last dose 2 weeks ago  1. admit to med  2. s/p NS 1000 cc at [facility]  3. zosyn at [facility]  4. ceftriaxone 1 g in ED  5. NS @ 31 cc/kg = 2200 cc  6. change ABx to zosyn  7. follow up cx here and at [facility]  8. Onc consult      #HLD  1. statin  2. asa 81 mg      #HTN  1. irbesartan interch to losartan 100 mg qd  2. nebivolol 10 mg qd  3. amlodipine 2.5 mg qd      #VTE       73 year old female w hx bladder cancer s/p resection w ileal conduit, on Keytruda, DM, HLD, HTN presents to ED BIBEMS from [facility] c/o fevers      #Sepsis  T 101.9 w leukocytosis 18K, abn UA  #Acute pyelonephritis  #R ureteral stent w mod hydronephrosis  #Bladder CA s/p radical cystectomy w ileal conduit  #On immunotherapy   w last dose 2 weeks ago  #R back pain  1. admit to med  2. s/p NS 1000 cc at [facility]  3. zosyn at [facility]  4. ceftriaxone 1 g in ED  5. NS @ 31 cc/kg = 2200 cc  6. change ABx to zosyn  7. follow up cx here and at [facility]  8. Onc consult  9.  cc/hr her  10. ketorolac 15 mg IV x 1 dose to decrease pain and inflammation  11. morphine 2 mg prn mod pain  morphine 4 mg q 4 prn severe  naloxone 0.4 mg IV prn    #HLD  1. statin  2. asa 81 mg      #HTN  1. irbesartan interch to losartan 100 mg qd  2. nebivolol 10 mg qd  3. amlodipine 2.5 mg qd      #VTE  lovenox 40 mg q 24          90 minutes

## 2024-04-02 NOTE — PHYSICAL THERAPY INITIAL EVALUATION ADULT - SITTING BALANCE: STATIC
After Visit Summary   10/20/2017    Elizabeth Ulloa    MRN: [id number]           Patient Information     Date Of Birth          1999        Visit Information        Provider Department      10/20/2017 10:00 AM Clara Dias MD Peds Cardiology        Today's Diagnoses     Palpitations        Heart murmur        SVT (supraventricular tachycardia) (H)          Care Instructions      PEDS CARDIOLOGY  [facility]  [address] [address] [phone]      Cardiology Clinic  [phone]  RN Care CoordinatorCiera (Bre)  [phone]  Pediatric Call Center/Scheduling  [phone]    After Hours and Emergency Contact Number  [phone]  * Ask for the pediatric cardiologist on call         Prescription Renewals  The pharmacy must fax requests to [phone]  * Please allow 3-4 days for prescriptions to be authorized     DATE: 10/20/17    PATIENT NAME / MRN: Elizabeth Ulloa  [id number]   MONITOR NUMBER: [id number]    PEDIATRIC HOLTER MONITOR PRODUCT RESPONSIBILITY   AND FINANCIAL AGREEMENT      To the Parent/Guardian of Elizabeth Ulloa:    Your provider, Dr. Dias , has ordered a Holter Monitor for you to wear for 48 hours.      A staff member of the Pediatric Cardiology Department will instruct you on the proper use and care of the Holter monitor, and explain its functions.  For questions or concerns regarding the device, please contact the [facility]'s EKG Lab at [phone] or [phone] Monday through Friday between the hours of 7:00AM and 4:30PM.    Please note that this monitor is very sensitive to humidity/moisture and MUST NOT GET WET.  Please use caution when in the bathroom to avoid accidentally dropping the device in water.      This monitor must be returned in good working order to the [facility] or the [facility]  [facility]'s EKG Lab / Pediatric Cardiovascular Imaging Department either in person or by mail NO LATER THAN 10/25/17.  If this monitor has not been mailed or returned in person by this date, you will be responsible for the cost of replacing the monitor.  The current cost of replacement is $1,781.00.    ACCEPTANCE OF RESPONSIBILITY  I understand the above instructions and agree to be financially responsible for the cost of this monitor if it is lost or damaged beyond normal wear and tear or otherwise not returned in good working order by the date specified above.      __________________________________________  10/20/17, 10:52 AM                         SIGNATURE    __________________________________________        __________________________________________           PRINTED NAME    RELATIONSHIP TO PATIENT      SIGNATURE WITNESSED BY:                                                                       Clinic Staff       ___________________________________________________________________                                             PRINTED NAME, CREDENTIAL(S)  and  INITIALS              Follow-ups after your visit        Follow-up notes from your care team     Return in about 3 days (around 10/23/2017).      Your next 10 appointments already scheduled     Oct 23, 2017   Procedure with Clara Dias MD   [facility] [place], Same Day Surgery (--)    [address] [address] [phone]              Future tests that were ordered for you today     Open Future Orders        Priority Expected Expires Ordered    CBC with Differential & Platelets Routine  10/20/2018 10/20/2017    Comprehensive Metabolic Panel Routine  10/20/2018 10/20/2017    TSH with free T4 reflex Routine  10/20/2018 10/20/2017    Holter set up 48 hrs pediatric Routine  10/23/2017 10/20/2017            Who to contact     Please call your clinic at [phone] to:    Ask questions about your health    Make or cancel  "appointments    Discuss your medicines    Learn about your test results    Speak to your doctor   If you have compliments or concerns about an experience at your clinic, or if you wish to file a complaint, please contact [facility] Patient Relations at [phone] or email us at [email or web address]         Additional Information About Your Visit        MyChart Information     [facility] is an electronic gateway that provides easy, online access to your medical records. With [facility], you can request a clinic appointment, read your test results, renew a prescription or communicate with your care team.     To sign up for [facility], please contact your [facility] Clinic or call [phone] for assistance.           Care EveryWhere ID     This is your Care EveryWhere ID. This could be used by other organizations to access your [place] medical records  Opted out of Care Everywhere exchange        Your Vitals Were     Pulse Respirations Height Pulse Oximetry BMI (Body Mass Index)       105 20 5' 4.49\" (163.8 cm) 99% 24.26 kg/m2        Blood Pressure from Last 3 Encounters:   10/20/17 116/64   05/22/17 124/84   03/05/17 106/67    Weight from Last 3 Encounters:   10/20/17 143 lb 8.3 oz (65.1 kg) (83 %)*   05/22/17 145 lb (65.8 kg) (85 %)*   03/05/17 145 lb (65.8 kg) (86 %)*     * Growth percentiles are based on CDC 2-20 Years data.              We Performed the Following     ABO/Rh type and screen     EKG 12 lead - pediatric     HCG quantitative pregnancy     Holter set up 48 hrs pediatric        Primary Care Provider Office Phone # Fax #    Glenna Davis PA-C [phone]       [address] [place] [address]        Equal Access to Services     ALFRED OLMSTEAD : Josselyn Iyer, luz george, ronaldo vences. So [facility] [phone].    ATENCIÓN: Si jayy cha " good balance disposición servicios gratuitos de asistencia lingüística. Adryan aviles [phone].    We comply with applicable federal civil rights laws and [place] laws. We do not discriminate on the basis of race, color, national origin, age, disability, sex, sexual orientation, or gender identity.            Thank you!     Thank you for choosing [facility] CARDIOLOGY  for your care. Our goal is always to provide you with excellent care. Hearing back from our patients is one way we can continue to improve our services. Please take a few minutes to complete the written survey that you may receive in the mail after your visit with us. Thank you!             Your Updated Medication List - Protect others around you: Learn how to safely use, store and throw away your medicines at www.disposemymeds.org.          This list is accurate as of: 10/20/17 11:20 AM.  Always use your most recent med list.                   Brand Name Dispense Instructions for use Diagnosis    ibuprofen 600 MG tablet    ADVIL/MOTRIN    30 tablet    Take 1 tablet (600 mg) by mouth every 6 hours as needed for moderate pain        TYLENOL PO      Take 650 mg by mouth every 6 hours as needed for mild pain or fever

## 2024-04-02 NOTE — H&P ADULT - HIV OFFER
Opt out
For information on Fall & Injury Prevention, visit: [email or web address] OR  [email or web address] OR  https://www.cdc.gov/steadi/patient.html

## 2024-04-02 NOTE — H&P ADULT - HISTORY OF PRESENT ILLNESS
73 year old female w hx bladder cancer s/p resection w ileal conduit, on Keytruda, DM, HLD, HTN presents to ED BIBEMS from [facility] c/o fevers      woke up w T 101.9 last dose Keytruda was 2 weeks ago   +fevers today, tmax of 101.3, took tylenol 1000mg at 1pm today.   Went to [facility] for blood work and was told WBC 18K  Yesterday had right flank pain.  denies any  runny nose sore throat cough congestion vomiting diarrhea  AT [facility] : blood cx x 2   , RVP/COVID                NS 1000 cc                zosyn    In ED /52   HR 80   RR 18   T 98   98% RA  CT + moderate hydronephrosis, ureteral stent, + acute pyelo and R ureteritis  ceftriaxone  NS 31 cc/kg 2200 cc     PAST MEDICAL HX  Bladder cancer Has ilial conduit-urostomy- drainage bag  DM (diabetes mellitus)   HLD hyperlipidemia   HTN hypertension  Osteoarthritis - reports that she developed severe joint pain with BCG.  Required a cane to ambulate. Seen by a rheumatologist and reportedly had a  negative work up.  OP osteoporosis    PAST SURGICAL HX  Radical cystectoy w creation of ileal conduit and hysterectomy   Nephrostomy tube placement 2018  Placement of R ureteral stent 2018  L carotid artery stent  Hernia repair    FAMILY HX  Brother  of lung cancer, diabetes  Father  at age of 79 of CHF.  He has a history of emphysema and was a  smoker.  Mother- had alzheimers,  at 93      SOCIAL HX  Lives in [place], [place]  Retired; previously worked for the school district. Lives with  but  spends time with grandkids.  Daughter Livier is an NP       73 year old female w hx bladder cancer s/p resection w ileal conduit w stent, on Keytruda, DM, HLD, HTN presents to ED [place] [facility] [place]/o fevers    Yesterday + right flank pain intermittent then progressed to more severe  woke up w fever T 101.9    +fevers today, took tylenol 1000mg at 13:00 today.   Went to [facility] [place] work and was told WBC 18K  last dose Keytruda was 2 weeks ago  denies any  runny nose, sore throat, cough, congestion, vomiting ,diarrhea  AT [facility] : blood cx x 2   , RVP/COVID                NS 1000 cc                zosyn    In ED /52   HR 80   RR 18   T 98   98% RA  CT + moderate hydronephrosis, ureteral stent, + acute pyelo and R ureteritis  ceftriaxone 1 g  NS 31 cc/kg 2200 cc   glu 300s      Admitted to hospital service w sepsis from acute pyelonephritis in pt w bladder CA s/p radical cystectomy w ileal conduit and R ureteral stent through to stoma w mod R hydronephrosis      PAST MEDICAL HX  Bladder cancer Has ilial conduit-urostomy- drainage bag  DM (diabetes mellitus)   HLD hyperlipidemia   HTN hypertension  Osteoarthritis - reports that she developed severe joint pain with BCG.  Required a cane to ambulate. Seen by a rheumatologist and reportedly had a  negative work up.  OP osteoporosis    PAST SURGICAL HX  Radical cystectoy w creation of ileal conduit and hysterectomy   Nephrostomy tube placement 2018  Placement of R ureteral stent 2018  L carotid artery stent  Hernia repair    FAMILY HX  Brother  of lung cancer, diabetes  Father  at age of 79 of CHF.  He has a history of emphysema and was a  smoker.  Mother- had alzheimers,  at 93      SOCIAL HX  Lives in [place], [place]  Retired; previously worked for the school district. Lives with  but  spends time with grandkids.  Daughter Livier is an NP

## 2024-04-02 NOTE — PATIENT PROFILE ADULT - MST SCORE
Follow up with the pediatrician in 5 days.  If pain persists or you can't walk after 1 week you should follow up with the pediatric orthopedist.  Elevate at home to help with swelling/pain.  Cool compress 3-4 times per day for 15 minutes each time.  For pain you can take over the counter Ibuprofen 400 mg orally every 6 hours as needed for pain. Take medication with food.   If you experience any new or worsening symptoms or if you are concerned you can always come back to the emergency for a re-evaluation.
0

## 2024-04-02 NOTE — PATIENT PROFILE ADULT - LEGAL HELP
Depression Screening Entered On:  4/30/2019 4:44 PM CDT    Performed On:  4/30/2019 4:43 PM CDT by Fadia Ray CMA               Depression Screening   Little Interest - Pleasure in Activities :   Not at all   Feeling Down, Depressed, Hopeless :   Not at all   Initial Depression Screen Score :   0    Trouble Falling or Staying Asleep :   Not at all   Feeling Tired or Little Energy :   More than half the days   Poor Appetite or Overeating :   Not at all   Feeling Bad About Yourself :   Not at all   Trouble Concentrating :   Not at all   Moving or Speaking Slowly :   Not at all   Thoughts Better Off Dead or Hurting Self :   Not at all   Detailed Depression Screen Score :   2    Total Depression Screen Score :   2    DARÍO Difficulty with Work, Home, Others :   Not difficult at all   Fadia Ray CMA - 4/30/2019 4:43 PM CDT   no

## 2024-04-02 NOTE — H&P ADULT - NSHPPHYSICALEXAM_GEN_ALL_CORE
Vital Signs Last 24 Hrs  T(C): 37.7 (02 Apr 2024 00:15), Max: 39.2 (01 Apr 2024 21:22)  T(F): 99.9 (02 Apr 2024 00:15), Max: 102.5 (01 Apr 2024 21:22)  HR: 86 (01 Apr 2024 23:26) (80 - 102)  BP: 112/62 (01 Apr 2024 23:26) (105/50 - 127/66)  BP(mean): 66 (01 Apr 2024 23:09) (66 - 66)  RR: 20 (01 Apr 2024 23:26) (18 - 20)  SpO2: 96% (01 Apr 2024 23:26) (96% - 98%)    Parameters below as of 01 Apr 2024 23:26  Patient On (Oxygen Delivery Method): room air

## 2024-04-03 LAB
-  K. PNEUMONIAE GROUP: SIGNIFICANT CHANGE UP
24R-OH-CALCIDIOL SERPL-MCNC: 26.8 NG/ML — LOW (ref 30–80)
ALBUMIN SERPL ELPH-MCNC: 2.5 G/DL — LOW (ref 3.3–5)
ALP SERPL-CCNC: 64 U/L — SIGNIFICANT CHANGE UP (ref 40–120)
ALT FLD-CCNC: 19 U/L — SIGNIFICANT CHANGE UP (ref 12–78)
ANION GAP SERPL CALC-SCNC: 8 MMOL/L — SIGNIFICANT CHANGE UP (ref 5–17)
AST SERPL-CCNC: 13 U/L — LOW (ref 15–37)
BASOPHILS # BLD AUTO: 0.05 K/UL — SIGNIFICANT CHANGE UP (ref 0–0.2)
BASOPHILS NFR BLD AUTO: 0.5 % — SIGNIFICANT CHANGE UP (ref 0–2)
BILIRUB SERPL-MCNC: 0.5 MG/DL — SIGNIFICANT CHANGE UP (ref 0.2–1.2)
BUN SERPL-MCNC: 26 MG/DL — HIGH (ref 7–23)
CALCIUM SERPL-MCNC: 8.2 MG/DL — LOW (ref 8.5–10.1)
CHLORIDE SERPL-SCNC: 111 MMOL/L — HIGH (ref 96–108)
CO2 SERPL-SCNC: 19 MMOL/L — LOW (ref 22–31)
CREAT SERPL-MCNC: 1.52 MG/DL — HIGH (ref 0.5–1.3)
CRP SERPL-MCNC: 198 MG/L — HIGH
EGFR: 36 ML/MIN/1.73M2 — LOW
EOSINOPHIL # BLD AUTO: 0.16 K/UL — SIGNIFICANT CHANGE UP (ref 0–0.5)
EOSINOPHIL NFR BLD AUTO: 1.6 % — SIGNIFICANT CHANGE UP (ref 0–6)
FERRITIN SERPL-MCNC: 121 NG/ML — SIGNIFICANT CHANGE UP (ref 13–330)
FOLATE SERPL-MCNC: >20 NG/ML — SIGNIFICANT CHANGE UP
GLUCOSE BLDC GLUCOMTR-MCNC: 127 MG/DL — HIGH (ref 70–99)
GLUCOSE BLDC GLUCOMTR-MCNC: 151 MG/DL — HIGH (ref 70–99)
GLUCOSE BLDC GLUCOMTR-MCNC: 198 MG/DL — HIGH (ref 70–99)
GLUCOSE BLDC GLUCOMTR-MCNC: 204 MG/DL — HIGH (ref 70–99)
GLUCOSE SERPL-MCNC: 139 MG/DL — HIGH (ref 70–99)
GRAM STN FLD: ABNORMAL
HCT VFR BLD CALC: 24.9 % — LOW (ref 34.5–45)
HGB BLD-MCNC: 8 G/DL — LOW (ref 11.5–15.5)
IMM GRANULOCYTES NFR BLD AUTO: 0.5 % — SIGNIFICANT CHANGE UP (ref 0–0.9)
IRON SATN MFR SERPL: 3 % — LOW (ref 14–50)
IRON SATN MFR SERPL: 9 UG/DL — LOW (ref 30–160)
LYMPHOCYTES # BLD AUTO: 0.66 K/UL — LOW (ref 1–3.3)
LYMPHOCYTES # BLD AUTO: 6.7 % — LOW (ref 13–44)
MAGNESIUM SERPL-MCNC: 1.9 MG/DL — SIGNIFICANT CHANGE UP (ref 1.6–2.6)
MCHC RBC-ENTMCNC: 29.3 PG — SIGNIFICANT CHANGE UP (ref 27–34)
MCHC RBC-ENTMCNC: 32.1 GM/DL — SIGNIFICANT CHANGE UP (ref 32–36)
MCV RBC AUTO: 91.2 FL — SIGNIFICANT CHANGE UP (ref 80–100)
METHOD TYPE: SIGNIFICANT CHANGE UP
MONOCYTES # BLD AUTO: 0.68 K/UL — SIGNIFICANT CHANGE UP (ref 0–0.9)
MONOCYTES NFR BLD AUTO: 6.9 % — SIGNIFICANT CHANGE UP (ref 2–14)
NEUTROPHILS # BLD AUTO: 8.29 K/UL — HIGH (ref 1.8–7.4)
NEUTROPHILS NFR BLD AUTO: 83.8 % — HIGH (ref 43–77)
PHOSPHATE SERPL-MCNC: 2.8 MG/DL — SIGNIFICANT CHANGE UP (ref 2.5–4.5)
PLATELET # BLD AUTO: 188 K/UL — SIGNIFICANT CHANGE UP (ref 150–400)
POTASSIUM SERPL-MCNC: 3.9 MMOL/L — SIGNIFICANT CHANGE UP (ref 3.5–5.3)
POTASSIUM SERPL-SCNC: 3.9 MMOL/L — SIGNIFICANT CHANGE UP (ref 3.5–5.3)
PROCALCITONIN SERPL-MCNC: 1.43 NG/ML — HIGH (ref 0.02–0.1)
PROT SERPL-MCNC: 5.9 GM/DL — LOW (ref 6–8.3)
RBC # BLD: 2.73 M/UL — LOW (ref 3.8–5.2)
RBC # FLD: 14 % — SIGNIFICANT CHANGE UP (ref 10.3–14.5)
SODIUM SERPL-SCNC: 138 MMOL/L — SIGNIFICANT CHANGE UP (ref 135–145)
TIBC SERPL-MCNC: 284 UG/DL — SIGNIFICANT CHANGE UP (ref 220–430)
UIBC SERPL-MCNC: 275 UG/DL — SIGNIFICANT CHANGE UP (ref 110–370)
VIT B12 SERPL-MCNC: 363 PG/ML — SIGNIFICANT CHANGE UP (ref 232–1245)
WBC # BLD: 9.89 K/UL — SIGNIFICANT CHANGE UP (ref 3.8–10.5)
WBC # FLD AUTO: 9.89 K/UL — SIGNIFICANT CHANGE UP (ref 3.8–10.5)

## 2024-04-03 PROCEDURE — 99232 SBSQ HOSP IP/OBS MODERATE 35: CPT

## 2024-04-03 RX ORDER — HYDRALAZINE HCL 50 MG
5 TABLET ORAL
Refills: 0 | Status: DISCONTINUED | OUTPATIENT
Start: 2024-04-03 | End: 2024-04-07

## 2024-04-03 RX ADMIN — Medication 650 MILLIGRAM(S): at 05:27

## 2024-04-03 RX ADMIN — Medication 500 MILLIGRAM(S): at 09:18

## 2024-04-03 RX ADMIN — INSULIN GLARGINE 8 UNIT(S): 100 INJECTION, SOLUTION SUBCUTANEOUS at 21:15

## 2024-04-03 RX ADMIN — Medication 400 UNIT(S): at 09:18

## 2024-04-03 RX ADMIN — AMLODIPINE BESYLATE 2.5 MILLIGRAM(S): 2.5 TABLET ORAL at 09:18

## 2024-04-03 RX ADMIN — Medication 81 MILLIGRAM(S): at 09:18

## 2024-04-03 RX ADMIN — MAGNESIUM OXIDE 400 MG ORAL TABLET 400 MILLIGRAM(S): 241.3 TABLET ORAL at 09:18

## 2024-04-03 RX ADMIN — PIPERACILLIN AND TAZOBACTAM 25 GRAM(S): 4; .5 INJECTION, POWDER, LYOPHILIZED, FOR SOLUTION INTRAVENOUS at 13:48

## 2024-04-03 RX ADMIN — ENOXAPARIN SODIUM 40 MILLIGRAM(S): 100 INJECTION SUBCUTANEOUS at 05:28

## 2024-04-03 RX ADMIN — PIPERACILLIN AND TAZOBACTAM 25 GRAM(S): 4; .5 INJECTION, POWDER, LYOPHILIZED, FOR SOLUTION INTRAVENOUS at 05:28

## 2024-04-03 RX ADMIN — SODIUM CHLORIDE 100 MILLILITER(S): 9 INJECTION INTRAMUSCULAR; INTRAVENOUS; SUBCUTANEOUS at 15:40

## 2024-04-03 RX ADMIN — Medication 5 MILLIGRAM(S): at 21:13

## 2024-04-03 RX ADMIN — PIPERACILLIN AND TAZOBACTAM 25 GRAM(S): 4; .5 INJECTION, POWDER, LYOPHILIZED, FOR SOLUTION INTRAVENOUS at 21:17

## 2024-04-03 RX ADMIN — Medication 2: at 09:20

## 2024-04-03 RX ADMIN — ROSUVASTATIN CALCIUM 5 MILLIGRAM(S): 5 TABLET ORAL at 21:14

## 2024-04-03 RX ADMIN — Medication 1 TABLET(S): at 09:18

## 2024-04-03 RX ADMIN — NEBIVOLOL HYDROCHLORIDE 10 MILLIGRAM(S): 5 TABLET ORAL at 09:18

## 2024-04-03 RX ADMIN — Medication 4: at 13:01

## 2024-04-03 NOTE — CONSULT NOTE ADULT - ASSESSMENT
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 Hadley    # UTI  - Yesterday + right flank pain intermittent then progressed to more severe- woke up w fever T 101.9    - AT [facility] : given zosyn   - CT+ moderate hydronephrosis, ureteral stent, + acute pyelo and R ureteritis  - c/w zosyn   - WBC trending down 15.3, Hb 8.7, plt 201, Cr 1.42     will continue to follow 
A/P:  74 y/o female with right pyelonephritis    Monitor labs - WBC trending downwards  Ck Urine Cx, change antibiotics if not sensitive to Zosyn  Continue Zosyn  Above discussed with Dr. Wood
73 year old female w hx bladder cancer s/p resection w ileal conduit w stent, on Keytruda, DM, HLD, HTN admitted on 4/1 from [facility] for evaluation of fever and chills with right sided flank pain that was extreme and worse than ever before, at [facility] was found to have wbd to 18K at which point she was instructed to come to . Her last dose of Keytruda was 2 weeks ago. She was hypotensive on admission as well. This am after fluid boluses she is feeling better with improved blood pressure and pain much improved.     1. Patient admitted with sepsis secondary to pyelonephritis and has history of bladder cancer s/p cystectomy  - follow up cultures   - iv hydration and supportive care   - serial cbc and monitor temperature   - ostomy care  - will continue zosyn as ordered; in remote past had E coli that was resistant to zosyn, but that was very many years ago; doubt that is pertinent now  - consideration for urology evaluation  2. other issues: per medicine    Clinical team may change from intravenous to oral antibiotics when the following criteria are met:   1. Patient is clinically improving/stable       a)	Improved signs and symptoms of infection from initial presentation       b)	Afebrile for 24 hours       c)	Leukocytosis trending towards normal range   2. Patient is tolerating oral intake   3. Initial/repeat blood cultures are negative OR do not need to wait for preliminary blood cultures to result    When above criteria met oral antibiotic choice too soon to be determined

## 2024-04-03 NOTE — CHART NOTE - NSCHARTNOTEFT_GEN_A_CORE
HOSPITALIST PA CHART     Received call from RN that patient has elevated BP reading     Vital Signs Last 24 Hrs  T(C): 36.7 (03 Apr 2024 20:20), Max: 38.6 (03 Apr 2024 05:25)  T(F): 98 (03 Apr 2024 20:20), Max: 101.4 (03 Apr 2024 05:25)  HR: 87 (03 Apr 2024 20:20) (65 - 87)  BP: 164/66 (03 Apr 2024 20:20) (116/52 - 164/66)  BP(mean): --  RR: 18 (03 Apr 2024 20:20) (18 - 18)  SpO2: 99% (03 Apr 2024 20:20) (95% - 99%)    Parameters below as of 03 Apr 2024 20:20  Patient On (Oxygen Delivery Method): room air    Patient was on Irbesartan at home in addition to amlodipine and Bystolic.   ARB was stopped as patient was found to be hypotensive and also with some BELIA on this admission. Patient admitted with acute pyelonephritis     Will continue to hold ARB. PRN order placed for Hydralazine for SBP> 160     Continue to monitor.

## 2024-04-03 NOTE — CONSULT NOTE ADULT - SUBJECTIVE AND OBJECTIVE BOX
HPI:  73 year old female w hx bladder cancer s/p resection w ileal conduit w stent, on Keytruda, DM, HLD, HTN admitted on  from [facility] for evaluation of fever and chills with right sided flank pain that was extreme and worse than ever before, at [facility] was found to have wbd to 18K at which point she was instructed to come to . Her last dose of Keytruda was 2 weeks ago. She was hypotensive on admission as well. This am after fluid boluses she is feeling better with improved blood pressure and pain much improved.         PAST MEDICAL HX  Bladder cancer Has ilial conduit-urostomy- drainage bag  DM (diabetes mellitus)   HLD hyperlipidemia   HTN hypertension  Osteoarthritis - reports that she developed severe joint pain with BCG.  Required a cane to ambulate. Seen by a rheumatologist and reportedly had a  negative work up.  OP osteoporosis    PAST SURGICAL HX  Radical cystectoy w creation of ileal conduit and hysterectomy   Nephrostomy tube placement   Placement of R ureteral stent   L carotid artery stent  Hernia repair    FAMILY HX  Brother  of lung cancer, diabetes  Father  at age of 79 of CHF.  He has a history of emphysema and was a  smoker.  Mother- had alzheimers,  at 93      SOCIAL HX  Lives in [place], [place]  Retired; previously worked for the school district. Lives with  but  spends time with grandkids.  Daughter Livier is an NP, one daughter  at age 3 years old       (2024 01:04)      PMH: as above  PSH: as above  Meds: per reconciliation sheet, noted below  MEDICATIONS  (STANDING):  amLODIPine   Tablet 2.5 milliGRAM(s) Oral daily  ascorbic acid 500 milliGRAM(s) Oral daily  aspirin  chewable 81 milliGRAM(s) Oral daily  cholecalciferol 400 Unit(s) Oral daily  dextrose 5%. 1000 milliLiter(s) (50 mL/Hr) IV Continuous <Continuous>  dextrose 5%. 1000 milliLiter(s) (100 mL/Hr) IV Continuous <Continuous>  dextrose 50% Injectable 12.5 Gram(s) IV Push once  dextrose 50% Injectable 25 Gram(s) IV Push once  dextrose 50% Injectable 25 Gram(s) IV Push once  enoxaparin Injectable 40 milliGRAM(s) SubCutaneous every 24 hours  glucagon  Injectable 1 milliGRAM(s) IntraMuscular once  influenza  Vaccine (HIGH DOSE) 0.7 milliLiter(s) IntraMuscular once  insulin glargine Injectable (LANTUS) 8 Unit(s) SubCutaneous at bedtime  insulin lispro (ADMELOG) corrective regimen sliding scale   SubCutaneous three times a day before meals  insulin lispro (ADMELOG) corrective regimen sliding scale   SubCutaneous at bedtime  losartan 50 milliGRAM(s) Oral daily  magnesium oxide 400 milliGRAM(s) Oral daily  multivitamin/minerals 1 Tablet(s) Oral daily  nebivolol 10 milliGRAM(s) Oral daily  piperacillin/tazobactam IVPB.. 3.375 Gram(s) IV Intermittent every 8 hours  senna 2 Tablet(s) Oral at bedtime  sodium chloride 0.9%. 1000 milliLiter(s) (100 mL/Hr) IV Continuous <Continuous>    MEDICATIONS  (PRN):  acetaminophen     Tablet .. 650 milliGRAM(s) Oral every 4 hours PRN Temp greater or equal to 38C (100.4F)  aluminum hydroxide/magnesium hydroxide/simethicone Suspension 30 milliLiter(s) Oral every 4 hours PRN Dyspepsia  dextrose Oral Gel 15 Gram(s) Oral once PRN Blood Glucose LESS THAN 70 milliGRAM(s)/deciliter  melatonin 3 milliGRAM(s) Oral at bedtime PRN Insomnia  morphine  - Injectable 2 milliGRAM(s) IV Push every 4 hours PRN Moderate Pain (4 - 6)  morphine  - Injectable 4 milliGRAM(s) IV Push every 4 hours PRN Severe Pain (7 - 10)  naloxone Injectable 0.4 milliGRAM(s) IV Push once PRN oversedation  ondansetron Injectable 4 milliGRAM(s) IV Push every 8 hours PRN Nausea and/or Vomiting    Allergies    Levaquin (Unknown)  sulfa drugs (Unknown)  Originally Entered as [Unknown] reaction to [famciclovir] (Unknown)    Intolerances      Social: no smoking, no alcohol, no illegal drugs; no recent travel, no exposure to TB  FAMILY HISTORY:     no history of premature cardiovascular disease in first degree relatives  ROS: the patient has no HA, no dizziness, no sore throat, no blurry vision, no CP, no palpitations, no SOB, no cough, no abdominal pain, no diarrhea, no N/V, no dysuria, no leg pain, no claudication, no rash, no joint aches, no rectal pain or bleeding, no night sweats  All other systems reviewed and are negative    Vital Signs Last 24 Hrs  T(C): 37.2 (2024 08:47), Max: 39.2 (2024 21:22)  T(F): 99 (2024 08:47), Max: 102.5 (2024 21:22)  HR: 71 (2024 08:47) (71 - 102)  BP: 108/49 (2024 08:47) (105/50 - 130/48)  BP(mean): 66 (2024 23:09) (66 - 66)  RR: 19 (2024 08:47) (18 - 20)  SpO2: 98% (2024 08:47) (96% - 98%)    Parameters below as of 2024 08:47  Patient On (Oxygen Delivery Method): room air      Daily     Daily     PE:    Constitutional: frail looking  HEENT: NC/AT, EOMI, PERRLA, conjunctivae clear; ears and nose atraumatic; pharynx clear  Neck: supple; thyroid not palpable  Back: no tenderness  Respiratory: respiratory effort normal; clear to auscultation  Cardiovascular: S1S2 regular, no murmurs  Abdomen: soft, not tender, not distended, positive BS; no liver or spleen organomegaly; ileal conduit in place  Genitourinary: no suprapubic tenderness  Musculoskeletal: no muscle tenderness, no joint swelling or tenderness  Neurological/ Psychiatric: AxOx3, judgement and insight normal;  moving all extremities  Skin: no rashes; no palpable lesions    Labs: all available labs reviewed                        8.7    15.34 )-----------( 201      ( 2024 06:24 )             27.1     04-02    139  |  111<H>  |  28<H>  ----------------------------<  225<H>  3.9   |  21<L>  |  1.42<H>    Ca    8.2<L>      2024 06:24    TPro  7.4  /  Alb  3.5  /  TBili  1.0  /  DBili  x   /  AST  14<L>  /  ALT  21  /  AlkPhos  87  04-01     LIVER FUNCTIONS - ( 2024 18:52 )  Alb: 3.5 g/dL / Pro: 7.4 gm/dL / ALK PHOS: 87 U/L / ALT: 21 U/L / AST: 14 U/L / GGT: x           Urinalysis Basic - ( 2024 06:24 )    Color: x / Appearance: x / SG: x / pH: x  Gluc: 225 mg/dL / Ketone: x  / Bili: x / Urobili: x   Blood: x / Protein: x / Nitrite: x   Leuk Esterase: x / RBC: x / WBC x   Sq Epi: x / Non Sq Epi: x / Bacteria: x    < from: CT Abdomen and Pelvis w/ IV Cont (24 @ 21:13) >    ACC: [id number] EXAM:  CT ABDOMEN AND PELVIS IC   ORDERED BY: YURI DUFF     PROCEDURE DATE:  2024          INTERPRETATION:  CLINICAL INFORMATION:  73-year-old female history of   hypertension high cholesterol diabetes bladder cancer status post   resection with a ileal conduit urostomy drainage bag on Keytruda every 3   weeks last dose was 2 weeks ago presents the emergency department for   fever. Patient states she woke up with feverTmax of 101.9. Patient states   she went to [facility] today where they did blood work told her she has a white   blood cell count of 18 told her to come to the hospital. Patient denies   any runny nose sore throat cough congestion vomiting diarrhea. She states   that yesterday she had right flank pain. She states that [facility] told her she   should get a CAT scan. Exam here is nonfocal she has no CVA tenderness no   abdominal tenderness will workup for fever in an immunocompromised   patient labs urine CT and admit.    COMPARISON: CT of the abdomen/pelvis dated 10/7/2008.    CONTRAST/COMPLICATIONS:  IV Contrast: Omnipaque 350  90 cc administered   0 cc discarded  Oral Contrast: NONE  Complications: None reported at time of study completion    PROCEDURE:  CT of the Abdomen and Pelvis was performed.  Sagittal and coronal reformats were performed.    FINDINGS:  LOWER CHEST: Tiny/small hiatal hernia, not seen on prior study. Mild   bibasilar atelectasis.    LIVER: Steatosis.  BILE DUCTS: Normal caliber.  GALLBLADDER: At the upper limits of normal. Trace pericholecystic fluid.   Findings likely reactive to the right renal pathology.  SPLEEN: Within normal limits.  PANCREAS: Within normal limits.  ADRENALS: Within normal limits.  KIDNEYS/URETERS: See below.    BLADDER: Status post cystectomy with pelvic/retroperitoneal   lymphadenectomy and right-sided ileal conduit urinary diversion.   Right-sided ureteral stent is seen with proximal end curled in the right   renal pelvis and distal and exiting through the ileal conduit. Moderate   right-sided hydronephrosis. Areas of decreased attenuation are seen   throughout the right kidney suggesting acute pyelonephritis. Right-sided   ureteritis is also suggested. Moderate right-sided hydronephrosis.   Asymmetric right-sided perinephric fat stranding/fluid. Mild/moderate   left-sided hydroureteronephrosis with enhancement of the urothelial   lining suggesting ureteritis. Subtle area of decreased attenuation is   also seen involving the lower pole of the left kidney suggesting early   acute pyelonephritis. Bilateral renal cortical scarring. A punctate   nonobstructing stone is seen in the midpole left kidney.  REPRODUCTIVE ORGANS: Hysterectomy.    BOWEL: Moderate parastomal hernia is seen containing the cecum without   evidence of obstruction. Moderate colonic diverticulosis. Appendix is not   visualized. No evidence of inflammation in the pericecal region.  PERITONEUM: Trace reactive free fluid.  VESSELS: Within normal limits  RETROPERITONEUM/LYMPH NODES: See above. No significant lymphadenopathy.  ABDOMINAL WALL: See above. Anterior abdominal wall hernia repair is noted   with mesh.  BONES: Degenerative changes.    IMPRESSION:    1. Status post cystectomy with pelvic/retroperitoneal lymphadenectomy and   right-sided ileal conduit urinary diversion. Right-sided ureteral stent   is seen with proximal end curled in the right renal pelvis and distal and   exiting through the ileal conduit. Moderate right-sided hydronephrosis.   Areas of decreased attenuation are seen throughout the right kidney   suggesting acute pyelonephritis. Right-sided ureteritis is also   suggested. Asymmetric right-sided perinephric fat stranding/fluid.   Mild/moderate left-sided hydroureteronephrosis with enhancement of the   urothelial lining suggesting ureteritis. Subtle area of decreased   attenuation is also seen involving the lower pole of the left kidney   suggesting early acute pyelonephritis.    2. Moderate parastomal hernia containing the cecum without evidence of   obstruction.    3. Gallbladder is of upper limits of normal. Trace pericholecystic fluid.   Findings likely reactive to the right renal pathology. Right upper   quadrant ultrasound recommended for further evaluation, as clinically   warranted.    4. Hepatic steatosis.    5. Tiny/smallhiatal hernia, not seen on prior study.    6. Colonic diverticulosis.    < end of copied text >        Radiology: all available radiological tests reviewed    Advanced directives addressed: full resuscitation
HPI:  73 year old female w hx bladder cancer s/p resection w ileal conduit w stent, on Keytruda LD 3/21/24, DM, HLD, HTN presents to ED BIBEMS from [facility] c/o fevers 2/2 UTI     Yesterday + right flank pain intermittent then progressed to more severe  woke up w fever T 101.9    +fevers today, took tylenol 1000mg at 13:00 today.   Went to [facility] for blood work and was told WBC 18K  last dose Keytruda was 2 weeks ago 3/21/24    AT [facility] : blood cx x 2   , RVP/COVID                NS 1000 cc                zosyn    In ED /52   HR 80   RR 18   T 98   98% RA  CT + moderate hydronephrosis, ureteral stent, + acute pyelo and R ureteritis  ceftriaxone 1 g  NS 31 cc/kg 2200 cc   glu 300s      Admitted to hospital service w sepsis from acute pyelonephritis in pt w bladder CA s/p radical cystectomy w ileal conduit and R ureteral stent through to stoma w mod R hydronephrosis      PAST MEDICAL HX  Bladder cancer Has ilial conduit-urostomy- drainage bag  DM (diabetes mellitus)   HLD hyperlipidemia   HTN hypertension  Osteoarthritis - reports that she developed severe joint pain with BCG.  Required a cane to ambulate. Seen by a rheumatologist and reportedly had a  negative work up.  OP osteoporosis    PAST SURGICAL HX  Radical cystectoy w creation of ileal conduit and hysterectomy   Nephrostomy tube placement 2018  Placement of R ureteral stent 2018  L carotid artery stent  Hernia repair    FAMILY HX  Brother  of lung cancer, diabetes  Father  at age of 79 of CHF.  He has a history of emphysema and was a  smoker.  Mother- had alzheimers,  at 93      SOCIAL HX  Lives in [place], [place]  Retired; previously worked for the school district. Lives with  but  spends time with grandkids.  Daughter Livier is an NP       (2024 01:04)      PAST MEDICAL & SURGICAL HISTORY:  DM (diabetes mellitus)      Bladder cancer  Has ilial conduit-urostomy- drainage bag      Hyperlipidemia      Hypertension          Allergies    Levaquin (Unknown)  sulfa drugs (Unknown)  Originally Entered as [Unknown] reaction to [famciclovir] (Unknown)    Intolerances        MEDICATIONS  (STANDING):  amLODIPine   Tablet 2.5 milliGRAM(s) Oral daily  ascorbic acid 500 milliGRAM(s) Oral daily  aspirin  chewable 81 milliGRAM(s) Oral daily  cholecalciferol 400 Unit(s) Oral daily  dextrose 5%. 1000 milliLiter(s) (50 mL/Hr) IV Continuous <Continuous>  dextrose 5%. 1000 milliLiter(s) (100 mL/Hr) IV Continuous <Continuous>  dextrose 50% Injectable 25 Gram(s) IV Push once  dextrose 50% Injectable 12.5 Gram(s) IV Push once  dextrose 50% Injectable 25 Gram(s) IV Push once  enoxaparin Injectable 40 milliGRAM(s) SubCutaneous every 24 hours  glucagon  Injectable 1 milliGRAM(s) IntraMuscular once  influenza  Vaccine (HIGH DOSE) 0.7 milliLiter(s) IntraMuscular once  insulin glargine Injectable (LANTUS) 8 Unit(s) SubCutaneous at bedtime  insulin lispro (ADMELOG) corrective regimen sliding scale   SubCutaneous three times a day before meals  insulin lispro (ADMELOG) corrective regimen sliding scale   SubCutaneous at bedtime  losartan 50 milliGRAM(s) Oral daily  magnesium oxide 400 milliGRAM(s) Oral daily  multivitamin/minerals 1 Tablet(s) Oral daily  nebivolol 10 milliGRAM(s) Oral daily  piperacillin/tazobactam IVPB.. 3.375 Gram(s) IV Intermittent every 8 hours  senna 2 Tablet(s) Oral at bedtime  sodium chloride 0.9%. 1000 milliLiter(s) (100 mL/Hr) IV Continuous <Continuous>    MEDICATIONS  (PRN):  acetaminophen     Tablet .. 650 milliGRAM(s) Oral every 4 hours PRN Temp greater or equal to 38C (100.4F)  aluminum hydroxide/magnesium hydroxide/simethicone Suspension 30 milliLiter(s) Oral every 4 hours PRN Dyspepsia  dextrose Oral Gel 15 Gram(s) Oral once PRN Blood Glucose LESS THAN 70 milliGRAM(s)/deciliter  melatonin 3 milliGRAM(s) Oral at bedtime PRN Insomnia  morphine  - Injectable 2 milliGRAM(s) IV Push every 4 hours PRN Moderate Pain (4 - 6)  morphine  - Injectable 4 milliGRAM(s) IV Push every 4 hours PRN Severe Pain (7 - 10)  naloxone Injectable 0.4 milliGRAM(s) IV Push once PRN oversedation  ondansetron Injectable 4 milliGRAM(s) IV Push every 8 hours PRN Nausea and/or Vomiting      FAMILY HISTORY:      SOCIAL HISTORY: No EtOH, no tobacco    REVIEW OF SYSTEMS:  +fever, chills  no abdominal pain, constipation ro diarrhea  no sob or chest pain     Weight (kg): 70.3 ( @ 18:13)    T(F): 99 (24 @ 08:47), Max: 102.5 (24 @ 21:22)  HR: 71 (24 @ 08:47)  BP: 108/49 (24 @ 08:47)  RR: 19 (24 @ 08:47)  SpO2: 98% (24 @ 08:47)  Wt(kg): --    GENERAL: NAD  HEAD:  Atraumatic, Normocephalic  EYES: EOMI  CHEST/LUNG: Clear to auscultation bilaterally; No wheeze  HEART: Regular rate and rhythm;   ABDOMEN: Soft, Nontender,   EXTREMITIES:  ileal conduit   NEUROLOGY: non-focal aox3  SKIN: No rashes or lesions                          8.7    15.34 )-----------( 201      ( 2024 06:24 )             27.1       04    139  |  111<H>  |  28<H>  ----------------------------<  225<H>  3.9   |  21<L>  |  1.42<H>    Ca    8.2<L>      2024 06:24    TPro  7.4  /  Alb  3.5  /  TBili  1.0  /  DBili  x   /  AST  14<L>  /  ALT  21  /  AlkPhos  87  04          PT/INR - ( 2024 18:52 )   PT: 11.4 sec;   INR: 1.01 ratio         PTT - ( 2024 18:52 )  PTT:28.7 sec    
73 year old female w hx bladder cancer s/p resection w ileal conduit w stent, on Keytruda, DM, HLD, HTN presents to ED BIBEMS from [facility] c/o fevers    Yesterday + right flank pain intermittent then progressed to more severe  woke up w fever T 101.9    +fevers today, took tylenol 1000mg at 13:00 today.   Went to [facility] for blood work and was told WBC 18K  last dose Keytruda was 2 weeks ago  denies any  runny nose, sore throat, cough, congestion, vomiting ,diarrhea  AT [facility] : blood cx x 2   , RVP/COVID                NS 1000 cc                zosyn    In ED /52   HR 80   RR 18   T 98   98% RA  CT + moderate hydronephrosis, ureteral stent, + acute pyelo and R ureteritis  ceftriaxone 1 g  NS 31 cc/kg 2200 cc   glu 300s      Called to see this 72 y/o female w bladder CA s/p radical cystectomy w ileal conduit and R ureteral stent through to stoma w mod R hydronephrosis and right pyelonephritis.  Pt had fever, right flank pain and nausea at home.  Denies hematuria, vomiting or any other complaints.      PAST MEDICAL HX  Bladder cancer Has ilial conduit-urostomy- drainage bag  DM (diabetes mellitus)   HLD hyperlipidemia   HTN hypertension  Osteoarthritis - reports that she developed severe joint pain with BCG.  Required a cane to ambulate. Seen by a rheumatologist and reportedly had a  negative work up.  OP osteoporosis    PAST SURGICAL HX  Radical cystectoy w creation of ileal conduit and hysterectomy   Nephrostomy tube placement   Placement of R ureteral stent   L carotid artery stent  Hernia repair    FAMILY HX  Brother  of lung cancer, diabetes  Father  at age of 79 of CHF.  He has a history of emphysema and was a  smoker.  Mother- had alzheimers,  at 93      SOCIAL HX  Lives in [place], [place]  Retired; previously worked for the school district. Lives with  but  spends time with grandkids.  Daughter Livier is an NP      Allergies and Intolerances:        Allergies:  	Levaquin: Drug, Unknown, Originally Entered as [Unknown] reaction to [Levaquin]  	Originally Entered as [Unknown] reaction to [famciclovir] [freetext allergy; no alerts]: Drug, Unknown, Originally Entered as [Unknown] reaction to [famciclovir]  	sulfa drugs: Drug Category, Unknown, Originally Entered as [Unknown] reaction to [Sulfa (Sulfonamide Antibiotics)]    Home Medications:   * Patient Currently Takes Medications as of 2024 22:23 documented in Structured Notes  · 	irbesartan 300 mg oral tablet: Last Dose Taken:  , 1 tab(s) orally once a day  · 	nateglinide 120 mg oral tablet: 1 tab(s) orally 3 times a day  · 	aspirin 81 mg oral tablet, chewable: Last Dose Taken:  , 1 tab(s) orally once a day  · 	Vitamin C 500 mg oral tablet: 1 tab(s) orally once a day  · 	amLODIPine 2.5 mg oral tablet: Last Dose Taken:  , 1 tab(s) orally once a day  · 	Vitamin D3 10 mcg (400 intl units) oral capsule: 1 cap(s) orally once a day  · 	Lipoflavonoid oral capsule: 1 cap(s) orally once a day  · 	Multi-Day Plus Minerals oral tablet: Last Dose Taken:  , 1 tab(s) orally once a day  · 	Senna S 50 mg-8.6 mg oral tablet: 1 tab(s) orally once a day  · 	magnesium oxide 500 mg oral tablet: 1 tab(s) orally once a day  · 	Cranberry oral capsule: Last Dose Taken:  , 500 milligram(s) orally 2 times a day  · 	rosuvastatin 5 mg oral tablet: Last Dose Taken:  , 1 tab(s) orally once a day (at bedtime)  · 	nebivolol 10 mg oral tablet: 1 tab(s) orally once a day  · 	Culturee Digestive Health Extra Strength oral capsule: Last Dose Taken:  , 1 cap(s) orally once a day  · 	turmeric 500 mg oral capsule: 1 cap(s) orally once a day  · 	Tradjenta 5 mg oral tablet: Last Dose Taken:  , 1 tab(s) orally once a day    Patient History:    Social History:  · Substance use	No     Tobacco Screening:  · Core Measure Site	Yes  · Has the patient used tobacco in the past 30 days?	No    Risk Assessment:    Present on Admission:  Deep Venous Thrombosis	no  Pulmonary Embolus	no     Review of Systems:  Other Review of Systems: All other review of systems negative, except as noted in HPI     Physical Exam:   Vital Signs Last 24 Hrs  T(C): 36.9 (2024 09:06), Max: 38.6 (2024 05:25)  T(F): 98.5 (2024 09:06), Max: 101.4 (2024 05:25)  HR: 79 (2024 09:06) (78 - 79)  BP: 127/52 (2024 09:06) (112/50 - 127/52)  BP(mean): --  RR: 18 (2024 09:06) (18 - 18)  SpO2: 96% (2024 09:06) (95% - 96%)    Parameters below as of 2024 09:06  Patient On (Oxygen Delivery Method): room air    · Constitutional	comfortable, resting in bed  · Eyes	conjunctiva clear  · Respiratory	CTA bilat, no rales, rhonchi or wheezes  · Cardiovascular	RRR; S1 S2 present; no pedal edema  · Gastrointestinal	soft; NT/ND, +BS,      Stoma: pink and patent - draining yellow urine, stent and catheter inside bag  Back:  No CVA tenderness  · Mental Status	alert and oriented x 3  Lower Ext:  no calf tenderness  · Skin	warm and dry; color normal  · Psychiatric	normal affect      LABS:                          8.0    9.89  )-----------( 188      ( 2024 06:28 )             24.9     04-03    138  |  111<H>  |  26<H>  ----------------------------<  139<H>  3.9   |  19<L>  |  1.52<H>    Ca    8.2<L>      2024 06:28  Phos  2.8     04-03  Mg     1.9     04-03    TPro  5.9<L>  /  Alb  2.5<L>  /  TBili  0.5  /  DBili  x   /  AST  13<L>  /  ALT  19  /  AlkPhos  64  04-03    LIVER FUNCTIONS - ( 2024 06:28 )  Alb: 2.5 g/dL / Pro: 5.9 gm/dL / ALK PHOS: 64 U/L / ALT: 19 U/L / AST: 13 U/L / GGT: x           PT/INR - ( 2024 18:52 )   PT: 11.4 sec;   INR: 1.01 ratio         PTT - ( 2024 18:52 )  PTT:28.7 sec  Urinalysis Basic - ( 2024 06:28 )    Color: x / Appearance: x / SG: x / pH: x  Gluc: 139 mg/dL / Ketone: x  / Bili: x / Urobili: x   Blood: x / Protein: x / Nitrite: x   Leuk Esterase: x / RBC: x / WBC x   Sq Epi: x / Non Sq Epi: x / Bacteria: x    ACC: [id number] EXAM:  CT ABDOMEN AND PELVIS IC   ORDERED BY: YURI DUFF     PROCEDURE DATE:  2024          INTERPRETATION:  CLINICAL INFORMATION:  73-year-old female history of   hypertension high cholesterol diabetes bladder cancer status post   resection with a ileal conduit urostomy drainage bag on Keytruda every 3   weeks last dose was 2 weeks ago presents the emergency department for   fever. Patient states she woke up with feverTmax of 101.9. Patient states   she went to [facility] today where they did blood work told her she has a white   blood cell count of 18 told her to come to the hospital. Patient denies   any runny nose sore throat cough congestion vomiting diarrhea. She states   that yesterday she had right flank pain. She states that [facility] told her she   should get a CAT scan. Exam here is nonfocal she has no CVA tenderness no   abdominal tenderness will workup for fever in an immunocompromised   patient labs urine CT and admit.    COMPARISON: CT of the abdomen/pelvis dated 10/7/2008.    CONTRAST/COMPLICATIONS:  IV Contrast: Omnipaque 350  90 cc administered   0 cc discarded  Oral Contrast: NONE  Complications: None reported at time of study completion    PROCEDURE:  CT of the Abdomen and Pelvis was performed.  Sagittal and coronal reformats were performed.    FINDINGS:  LOWER CHEST: Tiny/small hiatal hernia, not seen on prior study. Mild   bibasilar atelectasis.    LIVER: Steatosis.  BILE DUCTS: Normal caliber.  GALLBLADDER: At the upper limits of normal. Trace pericholecystic fluid.   Findings likely reactive to the right renal pathology.  SPLEEN: Within normal limits.  PANCREAS: Within normal limits.  ADRENALS: Within normal limits.  KIDNEYS/URETERS: See below.    BLADDER: Status post cystectomy with pelvic/retroperitoneal   lymphadenectomy and right-sided ileal conduit urinary diversion.   Right-sided ureteral stent is seen with proximal end curled in the right   renal pelvis and distal and exiting through the ileal conduit. Moderate   right-sided hydronephrosis. Areas of decreased attenuation are seen   throughout the right kidney suggesting acute pyelonephritis. Right-sided   ureteritis is also suggested. Moderate right-sided hydronephrosis.   Asymmetric right-sided perinephric fat stranding/fluid. Mild/moderate   left-sided hydroureteronephrosis with enhancement of the urothelial   lining suggesting ureteritis. Subtle area of decreased attenuation is   also seen involving the lower pole of the left kidney suggesting early   acute pyelonephritis. Bilateral renal cortical scarring. A punctate   nonobstructing stone is seen in the midpole left kidney.  REPRODUCTIVE ORGANS: Hysterectomy.    BOWEL: Moderate parastomal hernia is seen containing the cecum without   evidence of obstruction. Moderate colonic diverticulosis. Appendix is not   visualized. No evidence of inflammation in the pericecal region.  PERITONEUM: Trace reactive free fluid.  VESSELS: Within normal limits  RETROPERITONEUM/LYMPH NODES: See above. No significant lymphadenopathy.  ABDOMINAL WALL: See above. Anterior abdominal wall hernia repair is noted   with mesh.  BONES: Degenerative changes.    IMPRESSION:    1. Status post cystectomy with pelvic/retroperitoneal lymphadenectomy and   right-sided ileal conduit urinary diversion. Right-sided ureteral stent   is seen with proximal end curled in the right renal pelvis and distal and   exiting through the ileal conduit. Moderate right-sided hydronephrosis.   Areas of decreased attenuation are seen throughout the right kidney   suggesting acute pyelonephritis. Right-sided ureteritis is also   suggested. Asymmetric right-sided perinephric fat stranding/fluid.   Mild/moderate left-sided hydroureteronephrosis with enhancement of the   urothelial lining suggesting ureteritis. Subtle area of decreased   attenuation is also seen involving the lower pole of the left kidney   suggesting early acute pyelonephritis.    2. Moderate parastomal hernia containing the cecum without evidence of   obstruction.    3. Gallbladder is of upper limits of normal. Trace pericholecystic fluid.   Findings likely reactive to the right renal pathology. Right upper   quadrant ultrasound recommended for further evaluation, as clinically   warranted.    4. Hepatic steatosis.    5. Tiny/smallhiatal hernia, not seen on prior study.    6. Colonic diverticulosis.    
Mercedes Flap Text: The defect edges were debeveled with a #15 scalpel blade.  Given the location of the defect, shape of the defect and the proximity to free margins a Mercedes flap was deemed most appropriate.  Using a sterile surgical marker, an appropriate advancement flap was drawn incorporating the defect and placing the expected incisions within the relaxed skin tension lines where possible. The area thus outlined was incised deep to adipose tissue with a #15 scalpel blade.  The skin margins were undermined to an appropriate distance in all directions utilizing iris scissors.

## 2024-04-04 LAB
ALBUMIN SERPL ELPH-MCNC: 2.8 G/DL — LOW (ref 3.3–5)
ALP SERPL-CCNC: 73 U/L — SIGNIFICANT CHANGE UP (ref 40–120)
ALT FLD-CCNC: 26 U/L — SIGNIFICANT CHANGE UP (ref 12–78)
ANION GAP SERPL CALC-SCNC: 6 MMOL/L — SIGNIFICANT CHANGE UP (ref 5–17)
AST SERPL-CCNC: 17 U/L — SIGNIFICANT CHANGE UP (ref 15–37)
BASOPHILS # BLD AUTO: 0.06 K/UL — SIGNIFICANT CHANGE UP (ref 0–0.2)
BASOPHILS NFR BLD AUTO: 0.7 % — SIGNIFICANT CHANGE UP (ref 0–2)
BILIRUB SERPL-MCNC: 0.4 MG/DL — SIGNIFICANT CHANGE UP (ref 0.2–1.2)
BUN SERPL-MCNC: 21 MG/DL — SIGNIFICANT CHANGE UP (ref 7–23)
CALCIUM SERPL-MCNC: 8.7 MG/DL — SIGNIFICANT CHANGE UP (ref 8.5–10.1)
CHLORIDE SERPL-SCNC: 115 MMOL/L — HIGH (ref 96–108)
CO2 SERPL-SCNC: 21 MMOL/L — LOW (ref 22–31)
CREAT SERPL-MCNC: 1.38 MG/DL — HIGH (ref 0.5–1.3)
CRP SERPL-MCNC: 131 MG/L — HIGH
EGFR: 41 ML/MIN/1.73M2 — LOW
EOSINOPHIL # BLD AUTO: 0.25 K/UL — SIGNIFICANT CHANGE UP (ref 0–0.5)
EOSINOPHIL NFR BLD AUTO: 2.9 % — SIGNIFICANT CHANGE UP (ref 0–6)
GLUCOSE BLDC GLUCOMTR-MCNC: 132 MG/DL — HIGH (ref 70–99)
GLUCOSE BLDC GLUCOMTR-MCNC: 160 MG/DL — HIGH (ref 70–99)
GLUCOSE BLDC GLUCOMTR-MCNC: 173 MG/DL — HIGH (ref 70–99)
GLUCOSE BLDC GLUCOMTR-MCNC: 180 MG/DL — HIGH (ref 70–99)
GLUCOSE SERPL-MCNC: 127 MG/DL — HIGH (ref 70–99)
HCT VFR BLD CALC: 27.4 % — LOW (ref 34.5–45)
HGB BLD-MCNC: 8.7 G/DL — LOW (ref 11.5–15.5)
IMM GRANULOCYTES NFR BLD AUTO: 1.2 % — HIGH (ref 0–0.9)
LYMPHOCYTES # BLD AUTO: 1.04 K/UL — SIGNIFICANT CHANGE UP (ref 1–3.3)
LYMPHOCYTES # BLD AUTO: 12.1 % — LOW (ref 13–44)
MAGNESIUM SERPL-MCNC: 1.9 MG/DL — SIGNIFICANT CHANGE UP (ref 1.6–2.6)
MCHC RBC-ENTMCNC: 28.9 PG — SIGNIFICANT CHANGE UP (ref 27–34)
MCHC RBC-ENTMCNC: 31.8 GM/DL — LOW (ref 32–36)
MCV RBC AUTO: 91 FL — SIGNIFICANT CHANGE UP (ref 80–100)
MONOCYTES # BLD AUTO: 0.58 K/UL — SIGNIFICANT CHANGE UP (ref 0–0.9)
MONOCYTES NFR BLD AUTO: 6.7 % — SIGNIFICANT CHANGE UP (ref 2–14)
NEUTROPHILS # BLD AUTO: 6.57 K/UL — SIGNIFICANT CHANGE UP (ref 1.8–7.4)
NEUTROPHILS NFR BLD AUTO: 76.4 % — SIGNIFICANT CHANGE UP (ref 43–77)
PHOSPHATE SERPL-MCNC: 2.8 MG/DL — SIGNIFICANT CHANGE UP (ref 2.5–4.5)
PLATELET # BLD AUTO: 240 K/UL — SIGNIFICANT CHANGE UP (ref 150–400)
POTASSIUM SERPL-MCNC: 3.6 MMOL/L — SIGNIFICANT CHANGE UP (ref 3.5–5.3)
POTASSIUM SERPL-SCNC: 3.6 MMOL/L — SIGNIFICANT CHANGE UP (ref 3.5–5.3)
PROCALCITONIN SERPL-MCNC: 0.83 NG/ML — HIGH (ref 0.02–0.1)
PROT SERPL-MCNC: 6.7 GM/DL — SIGNIFICANT CHANGE UP (ref 6–8.3)
RBC # BLD: 3.01 M/UL — LOW (ref 3.8–5.2)
RBC # FLD: 13.8 % — SIGNIFICANT CHANGE UP (ref 10.3–14.5)
SODIUM SERPL-SCNC: 142 MMOL/L — SIGNIFICANT CHANGE UP (ref 135–145)
WBC # BLD: 8.6 K/UL — SIGNIFICANT CHANGE UP (ref 3.8–10.5)
WBC # FLD AUTO: 8.6 K/UL — SIGNIFICANT CHANGE UP (ref 3.8–10.5)

## 2024-04-04 PROCEDURE — 99232 SBSQ HOSP IP/OBS MODERATE 35: CPT

## 2024-04-04 RX ORDER — LACTOBACILLUS ACIDOPHILUS 100MM CELL
1 CAPSULE ORAL
Refills: 0 | Status: DISCONTINUED | OUTPATIENT
Start: 2024-04-04 | End: 2024-04-07

## 2024-04-04 RX ORDER — SODIUM CHLORIDE 9 MG/ML
1000 INJECTION, SOLUTION INTRAVENOUS
Refills: 0 | Status: DISCONTINUED | OUTPATIENT
Start: 2024-04-04 | End: 2024-04-05

## 2024-04-04 RX ORDER — LOPERAMIDE HCL 2 MG
2 TABLET ORAL ONCE
Refills: 0 | Status: COMPLETED | OUTPATIENT
Start: 2024-04-04 | End: 2024-04-04

## 2024-04-04 RX ADMIN — SODIUM CHLORIDE 75 MILLILITER(S): 9 INJECTION, SOLUTION INTRAVENOUS at 15:02

## 2024-04-04 RX ADMIN — ENOXAPARIN SODIUM 40 MILLIGRAM(S): 100 INJECTION SUBCUTANEOUS at 05:12

## 2024-04-04 RX ADMIN — Medication 500 MILLIGRAM(S): at 09:40

## 2024-04-04 RX ADMIN — PIPERACILLIN AND TAZOBACTAM 25 GRAM(S): 4; .5 INJECTION, POWDER, LYOPHILIZED, FOR SOLUTION INTRAVENOUS at 05:12

## 2024-04-04 RX ADMIN — Medication 81 MILLIGRAM(S): at 09:39

## 2024-04-04 RX ADMIN — NEBIVOLOL HYDROCHLORIDE 10 MILLIGRAM(S): 5 TABLET ORAL at 09:40

## 2024-04-04 RX ADMIN — Medication 2 MILLIGRAM(S): at 06:54

## 2024-04-04 RX ADMIN — Medication 1 TABLET(S): at 09:40

## 2024-04-04 RX ADMIN — INSULIN GLARGINE 8 UNIT(S): 100 INJECTION, SOLUTION SUBCUTANEOUS at 22:28

## 2024-04-04 RX ADMIN — Medication 1 TABLET(S): at 13:00

## 2024-04-04 RX ADMIN — SODIUM CHLORIDE 100 MILLILITER(S): 9 INJECTION INTRAMUSCULAR; INTRAVENOUS; SUBCUTANEOUS at 01:27

## 2024-04-04 RX ADMIN — MAGNESIUM OXIDE 400 MG ORAL TABLET 400 MILLIGRAM(S): 241.3 TABLET ORAL at 09:40

## 2024-04-04 RX ADMIN — Medication 2: at 17:50

## 2024-04-04 RX ADMIN — Medication 2: at 13:00

## 2024-04-04 RX ADMIN — Medication 1 TABLET(S): at 17:50

## 2024-04-04 RX ADMIN — PIPERACILLIN AND TAZOBACTAM 25 GRAM(S): 4; .5 INJECTION, POWDER, LYOPHILIZED, FOR SOLUTION INTRAVENOUS at 15:01

## 2024-04-04 RX ADMIN — AMLODIPINE BESYLATE 2.5 MILLIGRAM(S): 2.5 TABLET ORAL at 09:40

## 2024-04-04 RX ADMIN — ROSUVASTATIN CALCIUM 5 MILLIGRAM(S): 5 TABLET ORAL at 22:28

## 2024-04-04 RX ADMIN — PIPERACILLIN AND TAZOBACTAM 25 GRAM(S): 4; .5 INJECTION, POWDER, LYOPHILIZED, FOR SOLUTION INTRAVENOUS at 22:28

## 2024-04-04 RX ADMIN — SODIUM CHLORIDE 100 MILLILITER(S): 9 INJECTION INTRAMUSCULAR; INTRAVENOUS; SUBCUTANEOUS at 11:02

## 2024-04-04 RX ADMIN — Medication 400 UNIT(S): at 09:39

## 2024-04-05 LAB
-  AMPICILLIN/SULBACTAM: SIGNIFICANT CHANGE UP
-  AMPICILLIN: SIGNIFICANT CHANGE UP
-  AZTREONAM: SIGNIFICANT CHANGE UP
-  CEFAZOLIN: SIGNIFICANT CHANGE UP
-  CEFEPIME: SIGNIFICANT CHANGE UP
-  CEFOXITIN: SIGNIFICANT CHANGE UP
-  CEFTRIAXONE: SIGNIFICANT CHANGE UP
-  CIPROFLOXACIN: SIGNIFICANT CHANGE UP
-  ERTAPENEM: SIGNIFICANT CHANGE UP
-  GENTAMICIN: SIGNIFICANT CHANGE UP
-  IMIPENEM: SIGNIFICANT CHANGE UP
-  LEVOFLOXACIN: SIGNIFICANT CHANGE UP
-  MEROPENEM: SIGNIFICANT CHANGE UP
-  PIPERACILLIN/TAZOBACTAM: SIGNIFICANT CHANGE UP
-  TOBRAMYCIN: SIGNIFICANT CHANGE UP
-  TRIMETHOPRIM/SULFAMETHOXAZOLE: SIGNIFICANT CHANGE UP
ALBUMIN SERPL ELPH-MCNC: 2.7 G/DL — LOW (ref 3.3–5)
ALP SERPL-CCNC: 73 U/L — SIGNIFICANT CHANGE UP (ref 40–120)
ALT FLD-CCNC: 33 U/L — SIGNIFICANT CHANGE UP (ref 12–78)
ANION GAP SERPL CALC-SCNC: 8 MMOL/L — SIGNIFICANT CHANGE UP (ref 5–17)
AST SERPL-CCNC: 23 U/L — SIGNIFICANT CHANGE UP (ref 15–37)
BASOPHILS # BLD AUTO: 0.07 K/UL — SIGNIFICANT CHANGE UP (ref 0–0.2)
BASOPHILS NFR BLD AUTO: 1 % — SIGNIFICANT CHANGE UP (ref 0–2)
BILIRUB SERPL-MCNC: 0.4 MG/DL — SIGNIFICANT CHANGE UP (ref 0.2–1.2)
BUN SERPL-MCNC: 17 MG/DL — SIGNIFICANT CHANGE UP (ref 7–23)
CALCIUM SERPL-MCNC: 8.6 MG/DL — SIGNIFICANT CHANGE UP (ref 8.5–10.1)
CHLORIDE SERPL-SCNC: 115 MMOL/L — HIGH (ref 96–108)
CO2 SERPL-SCNC: 19 MMOL/L — LOW (ref 22–31)
CREAT SERPL-MCNC: 1.17 MG/DL — SIGNIFICANT CHANGE UP (ref 0.5–1.3)
CRP SERPL-MCNC: 65 MG/L — HIGH
CULTURE RESULTS: ABNORMAL
CULTURE RESULTS: ABNORMAL
EGFR: 50 ML/MIN/1.73M2 — LOW
EOSINOPHIL # BLD AUTO: 0.3 K/UL — SIGNIFICANT CHANGE UP (ref 0–0.5)
EOSINOPHIL NFR BLD AUTO: 4.3 % — SIGNIFICANT CHANGE UP (ref 0–6)
GLUCOSE BLDC GLUCOMTR-MCNC: 117 MG/DL — HIGH (ref 70–99)
GLUCOSE BLDC GLUCOMTR-MCNC: 132 MG/DL — HIGH (ref 70–99)
GLUCOSE BLDC GLUCOMTR-MCNC: 160 MG/DL — HIGH (ref 70–99)
GLUCOSE BLDC GLUCOMTR-MCNC: 226 MG/DL — HIGH (ref 70–99)
GLUCOSE SERPL-MCNC: 110 MG/DL — HIGH (ref 70–99)
GRAM STN FLD: ABNORMAL
HCT VFR BLD CALC: 25.7 % — LOW (ref 34.5–45)
HGB BLD-MCNC: 8.2 G/DL — LOW (ref 11.5–15.5)
IMM GRANULOCYTES NFR BLD AUTO: 0.7 % — SIGNIFICANT CHANGE UP (ref 0–0.9)
LYMPHOCYTES # BLD AUTO: 0.9 K/UL — LOW (ref 1–3.3)
LYMPHOCYTES # BLD AUTO: 12.9 % — LOW (ref 13–44)
MAGNESIUM SERPL-MCNC: 1.8 MG/DL — SIGNIFICANT CHANGE UP (ref 1.6–2.6)
MCHC RBC-ENTMCNC: 29.3 PG — SIGNIFICANT CHANGE UP (ref 27–34)
MCHC RBC-ENTMCNC: 31.9 GM/DL — LOW (ref 32–36)
MCV RBC AUTO: 91.8 FL — SIGNIFICANT CHANGE UP (ref 80–100)
METHOD TYPE: SIGNIFICANT CHANGE UP
MONOCYTES # BLD AUTO: 0.68 K/UL — SIGNIFICANT CHANGE UP (ref 0–0.9)
MONOCYTES NFR BLD AUTO: 9.7 % — SIGNIFICANT CHANGE UP (ref 2–14)
NEUTROPHILS # BLD AUTO: 5 K/UL — SIGNIFICANT CHANGE UP (ref 1.8–7.4)
NEUTROPHILS NFR BLD AUTO: 71.4 % — SIGNIFICANT CHANGE UP (ref 43–77)
ORGANISM # SPEC MICROSCOPIC CNT: ABNORMAL
ORGANISM # SPEC MICROSCOPIC CNT: ABNORMAL
ORGANISM # SPEC MICROSCOPIC CNT: SIGNIFICANT CHANGE UP
PHOSPHATE SERPL-MCNC: 3.6 MG/DL — SIGNIFICANT CHANGE UP (ref 2.5–4.5)
PLATELET # BLD AUTO: 223 K/UL — SIGNIFICANT CHANGE UP (ref 150–400)
POTASSIUM SERPL-MCNC: 3.7 MMOL/L — SIGNIFICANT CHANGE UP (ref 3.5–5.3)
POTASSIUM SERPL-SCNC: 3.7 MMOL/L — SIGNIFICANT CHANGE UP (ref 3.5–5.3)
PROT SERPL-MCNC: 6.2 GM/DL — SIGNIFICANT CHANGE UP (ref 6–8.3)
RBC # BLD: 2.8 M/UL — LOW (ref 3.8–5.2)
RBC # FLD: 14.1 % — SIGNIFICANT CHANGE UP (ref 10.3–14.5)
SODIUM SERPL-SCNC: 142 MMOL/L — SIGNIFICANT CHANGE UP (ref 135–145)
SPECIMEN SOURCE: SIGNIFICANT CHANGE UP
SPECIMEN SOURCE: SIGNIFICANT CHANGE UP
WBC # BLD: 7 K/UL — SIGNIFICANT CHANGE UP (ref 3.8–10.5)
WBC # FLD AUTO: 7 K/UL — SIGNIFICANT CHANGE UP (ref 3.8–10.5)

## 2024-04-05 PROCEDURE — 99232 SBSQ HOSP IP/OBS MODERATE 35: CPT

## 2024-04-05 RX ORDER — LOSARTAN POTASSIUM 100 MG/1
25 TABLET, FILM COATED ORAL DAILY
Refills: 0 | Status: DISCONTINUED | OUTPATIENT
Start: 2024-04-05 | End: 2024-04-07

## 2024-04-05 RX ORDER — CHOLECALCIFEROL (VITAMIN D3) 125 MCG
1000 CAPSULE ORAL AT BEDTIME
Refills: 0 | Status: DISCONTINUED | OUTPATIENT
Start: 2024-04-05 | End: 2024-04-07

## 2024-04-05 RX ORDER — CEFTRIAXONE 500 MG/1
2000 INJECTION, POWDER, FOR SOLUTION INTRAMUSCULAR; INTRAVENOUS EVERY 24 HOURS
Refills: 0 | Status: DISCONTINUED | OUTPATIENT
Start: 2024-04-05 | End: 2024-04-07

## 2024-04-05 RX ADMIN — Medication 1000 UNIT(S): at 21:18

## 2024-04-05 RX ADMIN — Medication 650 MILLIGRAM(S): at 21:43

## 2024-04-05 RX ADMIN — ROSUVASTATIN CALCIUM 5 MILLIGRAM(S): 5 TABLET ORAL at 21:18

## 2024-04-05 RX ADMIN — AMLODIPINE BESYLATE 2.5 MILLIGRAM(S): 2.5 TABLET ORAL at 10:43

## 2024-04-05 RX ADMIN — Medication 1 TABLET(S): at 10:44

## 2024-04-05 RX ADMIN — Medication 400 UNIT(S): at 10:44

## 2024-04-05 RX ADMIN — ENOXAPARIN SODIUM 40 MILLIGRAM(S): 100 INJECTION SUBCUTANEOUS at 05:17

## 2024-04-05 RX ADMIN — PIPERACILLIN AND TAZOBACTAM 25 GRAM(S): 4; .5 INJECTION, POWDER, LYOPHILIZED, FOR SOLUTION INTRAVENOUS at 05:17

## 2024-04-05 RX ADMIN — Medication 4: at 17:53

## 2024-04-05 RX ADMIN — Medication 500 MILLIGRAM(S): at 10:43

## 2024-04-05 RX ADMIN — Medication 650 MILLIGRAM(S): at 22:13

## 2024-04-05 RX ADMIN — SODIUM CHLORIDE 75 MILLILITER(S): 9 INJECTION, SOLUTION INTRAVENOUS at 05:26

## 2024-04-05 RX ADMIN — PIPERACILLIN AND TAZOBACTAM 25 GRAM(S): 4; .5 INJECTION, POWDER, LYOPHILIZED, FOR SOLUTION INTRAVENOUS at 13:27

## 2024-04-05 RX ADMIN — Medication 1 TABLET(S): at 10:43

## 2024-04-05 RX ADMIN — NEBIVOLOL HYDROCHLORIDE 10 MILLIGRAM(S): 5 TABLET ORAL at 10:43

## 2024-04-05 RX ADMIN — Medication 81 MILLIGRAM(S): at 10:43

## 2024-04-05 RX ADMIN — CEFTRIAXONE 2000 MILLIGRAM(S): 500 INJECTION, POWDER, FOR SOLUTION INTRAMUSCULAR; INTRAVENOUS at 21:20

## 2024-04-05 RX ADMIN — INSULIN GLARGINE 8 UNIT(S): 100 INJECTION, SOLUTION SUBCUTANEOUS at 21:20

## 2024-04-05 RX ADMIN — Medication 1 TABLET(S): at 13:27

## 2024-04-05 RX ADMIN — Medication 1 TABLET(S): at 17:54

## 2024-04-05 NOTE — PHARMACOTHERAPY INTERVENTION NOTE - COMMENTS
Medication reconciliation completed.  Reviewed Medication list and confirmed med allergies with patient; confirmed with Dr. First Medrigoberto. 
Case d/w Dr. Mcgarry. Patient's blood cultures growing Klebsiella sensitive to ceftriaxone. Recommended de-escalation from Zosyn to ceftriaxone.

## 2024-04-06 LAB
ANION GAP SERPL CALC-SCNC: 5 MMOL/L — SIGNIFICANT CHANGE UP (ref 5–17)
BASOPHILS # BLD AUTO: 0.05 K/UL — SIGNIFICANT CHANGE UP (ref 0–0.2)
BASOPHILS NFR BLD AUTO: 0.7 % — SIGNIFICANT CHANGE UP (ref 0–2)
BUN SERPL-MCNC: 17 MG/DL — SIGNIFICANT CHANGE UP (ref 7–23)
CALCIUM SERPL-MCNC: 9 MG/DL — SIGNIFICANT CHANGE UP (ref 8.5–10.1)
CHLORIDE SERPL-SCNC: 116 MMOL/L — HIGH (ref 96–108)
CO2 SERPL-SCNC: 23 MMOL/L — SIGNIFICANT CHANGE UP (ref 22–31)
CREAT SERPL-MCNC: 1.27 MG/DL — SIGNIFICANT CHANGE UP (ref 0.5–1.3)
CULTURE RESULTS: ABNORMAL
CULTURE RESULTS: ABNORMAL
EGFR: 45 ML/MIN/1.73M2 — LOW
EOSINOPHIL # BLD AUTO: 0.41 K/UL — SIGNIFICANT CHANGE UP (ref 0–0.5)
EOSINOPHIL NFR BLD AUTO: 5.6 % — SIGNIFICANT CHANGE UP (ref 0–6)
GI PCR PANEL: SIGNIFICANT CHANGE UP
GLUCOSE BLDC GLUCOMTR-MCNC: 125 MG/DL — HIGH (ref 70–99)
GLUCOSE BLDC GLUCOMTR-MCNC: 149 MG/DL — HIGH (ref 70–99)
GLUCOSE BLDC GLUCOMTR-MCNC: 163 MG/DL — HIGH (ref 70–99)
GLUCOSE BLDC GLUCOMTR-MCNC: 169 MG/DL — HIGH (ref 70–99)
GLUCOSE SERPL-MCNC: 139 MG/DL — HIGH (ref 70–99)
GRAM STN FLD: ABNORMAL
HCT VFR BLD CALC: 25.5 % — LOW (ref 34.5–45)
HGB BLD-MCNC: 8.3 G/DL — LOW (ref 11.5–15.5)
IMM GRANULOCYTES NFR BLD AUTO: 1.5 % — HIGH (ref 0–0.9)
LYMPHOCYTES # BLD AUTO: 1.11 K/UL — SIGNIFICANT CHANGE UP (ref 1–3.3)
LYMPHOCYTES # BLD AUTO: 15.2 % — SIGNIFICANT CHANGE UP (ref 13–44)
MAGNESIUM SERPL-MCNC: 1.6 MG/DL — SIGNIFICANT CHANGE UP (ref 1.6–2.6)
MCHC RBC-ENTMCNC: 29.5 PG — SIGNIFICANT CHANGE UP (ref 27–34)
MCHC RBC-ENTMCNC: 32.5 GM/DL — SIGNIFICANT CHANGE UP (ref 32–36)
MCV RBC AUTO: 90.7 FL — SIGNIFICANT CHANGE UP (ref 80–100)
MONOCYTES # BLD AUTO: 0.81 K/UL — SIGNIFICANT CHANGE UP (ref 0–0.9)
MONOCYTES NFR BLD AUTO: 11.1 % — SIGNIFICANT CHANGE UP (ref 2–14)
NEUTROPHILS # BLD AUTO: 4.83 K/UL — SIGNIFICANT CHANGE UP (ref 1.8–7.4)
NEUTROPHILS NFR BLD AUTO: 65.9 % — SIGNIFICANT CHANGE UP (ref 43–77)
PHOSPHATE SERPL-MCNC: 3.6 MG/DL — SIGNIFICANT CHANGE UP (ref 2.5–4.5)
PLATELET # BLD AUTO: 229 K/UL — SIGNIFICANT CHANGE UP (ref 150–400)
POTASSIUM SERPL-MCNC: 3.5 MMOL/L — SIGNIFICANT CHANGE UP (ref 3.5–5.3)
POTASSIUM SERPL-SCNC: 3.5 MMOL/L — SIGNIFICANT CHANGE UP (ref 3.5–5.3)
RBC # BLD: 2.81 M/UL — LOW (ref 3.8–5.2)
RBC # FLD: 13.6 % — SIGNIFICANT CHANGE UP (ref 10.3–14.5)
SODIUM SERPL-SCNC: 144 MMOL/L — SIGNIFICANT CHANGE UP (ref 135–145)
WBC # BLD: 7.32 K/UL — SIGNIFICANT CHANGE UP (ref 3.8–10.5)
WBC # FLD AUTO: 7.32 K/UL — SIGNIFICANT CHANGE UP (ref 3.8–10.5)

## 2024-04-06 PROCEDURE — 99232 SBSQ HOSP IP/OBS MODERATE 35: CPT

## 2024-04-06 RX ADMIN — ENOXAPARIN SODIUM 40 MILLIGRAM(S): 100 INJECTION SUBCUTANEOUS at 05:21

## 2024-04-06 RX ADMIN — AMLODIPINE BESYLATE 2.5 MILLIGRAM(S): 2.5 TABLET ORAL at 09:28

## 2024-04-06 RX ADMIN — INSULIN GLARGINE 8 UNIT(S): 100 INJECTION, SOLUTION SUBCUTANEOUS at 21:37

## 2024-04-06 RX ADMIN — Medication 2: at 17:39

## 2024-04-06 RX ADMIN — LOSARTAN POTASSIUM 25 MILLIGRAM(S): 100 TABLET, FILM COATED ORAL at 09:29

## 2024-04-06 RX ADMIN — Medication 1 TABLET(S): at 09:30

## 2024-04-06 RX ADMIN — NEBIVOLOL HYDROCHLORIDE 10 MILLIGRAM(S): 5 TABLET ORAL at 09:29

## 2024-04-06 RX ADMIN — Medication 1 TABLET(S): at 12:37

## 2024-04-06 RX ADMIN — Medication 1 TABLET(S): at 17:39

## 2024-04-06 RX ADMIN — Medication 2: at 12:36

## 2024-04-06 RX ADMIN — Medication 81 MILLIGRAM(S): at 09:29

## 2024-04-06 RX ADMIN — CEFTRIAXONE 2000 MILLIGRAM(S): 500 INJECTION, POWDER, FOR SOLUTION INTRAMUSCULAR; INTRAVENOUS at 21:37

## 2024-04-06 RX ADMIN — Medication 1 TABLET(S): at 09:29

## 2024-04-06 RX ADMIN — ROSUVASTATIN CALCIUM 5 MILLIGRAM(S): 5 TABLET ORAL at 21:38

## 2024-04-06 RX ADMIN — Medication 1000 UNIT(S): at 21:38

## 2024-04-06 RX ADMIN — Medication 500 MILLIGRAM(S): at 09:29

## 2024-04-06 NOTE — PROVIDER CONTACT NOTE (CRITICAL VALUE NOTIFICATION) - SITUATION
blood cultures final from 4/1 ----> growth anaerobic bottle with klebsiella pneumonia and growth aerobic bottle with gram negative rods

## 2024-04-06 NOTE — PROVIDER CONTACT NOTE (CRITICAL VALUE NOTIFICATION) - ACTION/TREATMENT ORDERED:
ID Following with the preliminary results   patient receiving IV rocephin
awaiting action
Additional Safety/Bands:

## 2024-04-07 ENCOUNTER — TRANSCRIPTION ENCOUNTER (OUTPATIENT)
Age: 74
End: 2024-04-07

## 2024-04-07 VITALS
SYSTOLIC BLOOD PRESSURE: 151 MMHG | DIASTOLIC BLOOD PRESSURE: 57 MMHG | TEMPERATURE: 98 F | HEART RATE: 64 BPM | OXYGEN SATURATION: 98 % | RESPIRATION RATE: 18 BRPM

## 2024-04-07 LAB
ANION GAP SERPL CALC-SCNC: 9 MMOL/L — SIGNIFICANT CHANGE UP (ref 5–17)
BUN SERPL-MCNC: 16 MG/DL — SIGNIFICANT CHANGE UP (ref 7–23)
C DIFF BY PCR RESULT: SIGNIFICANT CHANGE UP
CALCIUM SERPL-MCNC: 9.3 MG/DL — SIGNIFICANT CHANGE UP (ref 8.5–10.1)
CHLORIDE SERPL-SCNC: 114 MMOL/L — HIGH (ref 96–108)
CO2 SERPL-SCNC: 22 MMOL/L — SIGNIFICANT CHANGE UP (ref 22–31)
CREAT SERPL-MCNC: 1.12 MG/DL — SIGNIFICANT CHANGE UP (ref 0.5–1.3)
EGFR: 53 ML/MIN/1.73M2 — LOW
GLUCOSE BLDC GLUCOMTR-MCNC: 111 MG/DL — HIGH (ref 70–99)
GLUCOSE BLDC GLUCOMTR-MCNC: 203 MG/DL — HIGH (ref 70–99)
GLUCOSE SERPL-MCNC: 121 MG/DL — HIGH (ref 70–99)
HCT VFR BLD CALC: 26 % — LOW (ref 34.5–45)
HGB BLD-MCNC: 8.2 G/DL — LOW (ref 11.5–15.5)
MAGNESIUM SERPL-MCNC: 1.8 MG/DL — SIGNIFICANT CHANGE UP (ref 1.6–2.6)
MCHC RBC-ENTMCNC: 28.9 PG — SIGNIFICANT CHANGE UP (ref 27–34)
MCHC RBC-ENTMCNC: 31.5 GM/DL — LOW (ref 32–36)
MCV RBC AUTO: 91.5 FL — SIGNIFICANT CHANGE UP (ref 80–100)
PHOSPHATE SERPL-MCNC: 4.1 MG/DL — SIGNIFICANT CHANGE UP (ref 2.5–4.5)
PLATELET # BLD AUTO: 267 K/UL — SIGNIFICANT CHANGE UP (ref 150–400)
POTASSIUM SERPL-MCNC: 3.9 MMOL/L — SIGNIFICANT CHANGE UP (ref 3.5–5.3)
POTASSIUM SERPL-SCNC: 3.9 MMOL/L — SIGNIFICANT CHANGE UP (ref 3.5–5.3)
RBC # BLD: 2.84 M/UL — LOW (ref 3.8–5.2)
RBC # FLD: 13.7 % — SIGNIFICANT CHANGE UP (ref 10.3–14.5)
SODIUM SERPL-SCNC: 145 MMOL/L — SIGNIFICANT CHANGE UP (ref 135–145)
WBC # BLD: 7.92 K/UL — SIGNIFICANT CHANGE UP (ref 3.8–10.5)
WBC # FLD AUTO: 7.92 K/UL — SIGNIFICANT CHANGE UP (ref 3.8–10.5)

## 2024-04-07 PROCEDURE — 99239 HOSP IP/OBS DSCHRG MGMT >30: CPT

## 2024-04-07 RX ORDER — CHOLECALCIFEROL (VITAMIN D3) 125 MCG
1 CAPSULE ORAL
Qty: 30 | Refills: 0
Start: 2024-04-07 | End: 2024-05-06

## 2024-04-07 RX ORDER — CHOLECALCIFEROL (VITAMIN D3) 125 MCG
1 CAPSULE ORAL
Refills: 0 | DISCHARGE

## 2024-04-07 RX ORDER — IRBESARTAN 75 MG/1
1 TABLET ORAL
Qty: 30 | Refills: 0
Start: 2024-04-07 | End: 2024-05-06

## 2024-04-07 RX ORDER — LOSARTAN POTASSIUM 100 MG/1
50 TABLET, FILM COATED ORAL DAILY
Refills: 0 | Status: DISCONTINUED | OUTPATIENT
Start: 2024-04-08 | End: 2024-04-07

## 2024-04-07 RX ORDER — SENNOSIDES/DOCUSATE SODIUM 8.6MG-50MG
1 TABLET ORAL
Refills: 0 | DISCHARGE

## 2024-04-07 RX ORDER — LOSARTAN POTASSIUM 100 MG/1
25 TABLET, FILM COATED ORAL ONCE
Refills: 0 | Status: COMPLETED | OUTPATIENT
Start: 2024-04-07 | End: 2024-04-07

## 2024-04-07 RX ORDER — IRBESARTAN 75 MG/1
1 TABLET ORAL
Refills: 0 | DISCHARGE

## 2024-04-07 RX ORDER — CEFUROXIME AXETIL 250 MG
1 TABLET ORAL
Qty: 22 | Refills: 0
Start: 2024-04-07 | End: 2024-04-17

## 2024-04-07 RX ORDER — CEFUROXIME AXETIL 250 MG
1 TABLET ORAL
Qty: 16 | Refills: 0
Start: 2024-04-07 | End: 2024-04-14

## 2024-04-07 RX ADMIN — Medication 650 MILLIGRAM(S): at 02:55

## 2024-04-07 RX ADMIN — ENOXAPARIN SODIUM 40 MILLIGRAM(S): 100 INJECTION SUBCUTANEOUS at 05:04

## 2024-04-07 RX ADMIN — Medication 1 TABLET(S): at 13:05

## 2024-04-07 RX ADMIN — AMLODIPINE BESYLATE 2.5 MILLIGRAM(S): 2.5 TABLET ORAL at 09:06

## 2024-04-07 RX ADMIN — Medication 1 TABLET(S): at 09:08

## 2024-04-07 RX ADMIN — NEBIVOLOL HYDROCHLORIDE 10 MILLIGRAM(S): 5 TABLET ORAL at 09:08

## 2024-04-07 RX ADMIN — Medication 1 TABLET(S): at 09:07

## 2024-04-07 RX ADMIN — Medication 500 MILLIGRAM(S): at 09:06

## 2024-04-07 RX ADMIN — Medication 4: at 12:58

## 2024-04-07 RX ADMIN — Medication 81 MILLIGRAM(S): at 09:07

## 2024-04-07 RX ADMIN — LOSARTAN POTASSIUM 25 MILLIGRAM(S): 100 TABLET, FILM COATED ORAL at 09:07

## 2024-04-07 NOTE — PROGRESS NOTE ADULT - REASON FOR ADMISSION
sepsis  acute pyelonephritis

## 2024-04-07 NOTE — DISCHARGE NOTE PROVIDER - NSDCMRMEDTOKEN_GEN_ALL_CORE_FT
amLODIPine 2.5 mg oral tablet: 1 tab(s) orally once a day  aspirin 81 mg oral tablet, chewable: 1 tab(s) orally once a day  cefuroxime 500 mg oral tablet: 1 tab(s) orally 2 times a day  cholecalciferol 25 mcg (1000 intl units) oral tablet: 1 tab(s) orally once a day  Cranberry oral capsule: 500 milligram(s) orally 2 times a day  [facility] Extra Strength oral capsule: 1 cap(s) orally once a day  irbesartan 150 mg oral tablet: 1 tab(s) orally once a day  Lipoflavonoid oral capsule: 1 cap(s) orally once a day  magnesium oxide 500 mg oral tablet: 1 tab(s) orally once a day  Multi-Day Plus Minerals oral tablet: 1 tab(s) orally once a day  nateglinide 120 mg oral tablet: 1 tab(s) orally 3 times a day  nebivolol 10 mg oral tablet: 1 tab(s) orally once a day  rosuvastatin 5 mg oral tablet: 1 tab(s) orally once a day (at bedtime)  Tradjenta 5 mg oral tablet: 1 tab(s) orally once a day  turmeric 500 mg oral capsule: 1 cap(s) orally once a day  Vitamin C 500 mg oral tablet: 1 tab(s) orally once a day

## 2024-04-07 NOTE — PROGRESS NOTE ADULT - SUBJECTIVE AND OBJECTIVE BOX
INTERVAL HPI/OVERNIGHT EVENTS:  Patient S&E at bedside. No o/n events, patient resting comfortably. No complaints at this time.     VITAL SIGNS:  T(F): 98.3 (04-07-24 @ 08:29)  HR: 64 (04-07-24 @ 08:29)  BP: 151/57 (04-07-24 @ 08:29)  RR: 18 (04-07-24 @ 08:29)  SpO2: 98% (04-07-24 @ 08:29)  Wt(kg): --    PHYSICAL EXAM:    Constitutional: NAD  Eyes: EOMI, sclera non-icteric  Neck: supple, no masses, no JVD  Respiratory: CTA b/l, good air entry b/l  Cardiovascular: RRR, no M/R/G  Gastrointestinal: soft, NTND, no masses palpable, + BS, no hepatosplenomegaly  Extremities: no c/c/e  Neurological: AAOx3      MEDICATIONS  (STANDING):  amLODIPine   Tablet 2.5 milliGRAM(s) Oral daily  ascorbic acid 500 milliGRAM(s) Oral daily  aspirin  chewable 81 milliGRAM(s) Oral daily  cefTRIAXone Injectable. 2000 milliGRAM(s) IV Push every 24 hours  cholecalciferol 1000 Unit(s) Oral at bedtime  dextrose 5%. 1000 milliLiter(s) (100 mL/Hr) IV Continuous <Continuous>  dextrose 5%. 1000 milliLiter(s) (50 mL/Hr) IV Continuous <Continuous>  dextrose 50% Injectable 25 Gram(s) IV Push once  dextrose 50% Injectable 12.5 Gram(s) IV Push once  dextrose 50% Injectable 25 Gram(s) IV Push once  enoxaparin Injectable 40 milliGRAM(s) SubCutaneous every 24 hours  glucagon  Injectable 1 milliGRAM(s) IntraMuscular once  influenza  Vaccine (HIGH DOSE) 0.7 milliLiter(s) IntraMuscular once  insulin glargine Injectable (LANTUS) 8 Unit(s) SubCutaneous at bedtime  insulin lispro (ADMELOG) corrective regimen sliding scale   SubCutaneous three times a day before meals  insulin lispro (ADMELOG) corrective regimen sliding scale   SubCutaneous at bedtime  lactobacillus acidophilus 1 Tablet(s) Oral three times a day with meals  losartan 50 milliGRAM(s) Oral daily  multivitamin/minerals 1 Tablet(s) Oral daily  nebivolol 10 milliGRAM(s) Oral daily  rosuvastatin 5 milliGRAM(s) Oral at bedtime    MEDICATIONS  (PRN):  acetaminophen     Tablet .. 650 milliGRAM(s) Oral every 6 hours PRN Temp greater or equal to 38C (100.4F), Mild Pain (1 - 3)  aluminum hydroxide/magnesium hydroxide/simethicone Suspension 30 milliLiter(s) Oral every 4 hours PRN Dyspepsia  dextrose Oral Gel 15 Gram(s) Oral once PRN Blood Glucose LESS THAN 70 milliGRAM(s)/deciliter  hydrALAZINE Injectable 5 milliGRAM(s) IV Push two times a day PRN SBP > 160  melatonin 3 milliGRAM(s) Oral at bedtime PRN Insomnia  morphine  - Injectable 2 milliGRAM(s) IV Push every 4 hours PRN Moderate Pain (4 - 6)  morphine  - Injectable 4 milliGRAM(s) IV Push every 4 hours PRN Severe Pain (7 - 10)  naloxone Injectable 0.4 milliGRAM(s) IV Push once PRN oversedation  ondansetron Injectable 4 milliGRAM(s) IV Push every 8 hours PRN Nausea and/or Vomiting      Allergies    Levaquin (Unknown)  sulfa drugs (Unknown)  Originally Entered as [Unknown] reaction to [famciclovir] (Unknown)    Intolerances        LABS:                        8.2    7.92  )-----------( 267      ( 07 Apr 2024 07:26 )             26.0     04-07    145  |  114<H>  |  16  ----------------------------<  121<H>  3.9   |  22  |  1.12    Ca    9.3      07 Apr 2024 07:26  Phos  4.1     04-07  Mg     1.8     04-07        Urinalysis Basic - ( 07 Apr 2024 07:26 )    Color: x / Appearance: x / SG: x / pH: x  Gluc: 121 mg/dL / Ketone: x  / Bili: x / Urobili: x   Blood: x / Protein: x / Nitrite: x   Leuk Esterase: x / RBC: x / WBC x   Sq Epi: x / Non Sq Epi: x / Bacteria: x        RADIOLOGY & ADDITIONAL TESTS:  Studies reviewed.    ASSESSMENT & PLAN: 
Subjective:  Chief complain :  fevers    HPI:  73 year old female w hx bladder cancer s/p resection w ileal conduit w stent, on Keytruda, DM, HLD, HTN [place] [facility] [place]. Yesterday + right flank pain intermittent then progressed to more severe, woke up w fever T 101.9  , +fevers today, took tylenol 1000mg at 13:00 today. [place] [facility] [place] was told WBC 18K  last dose Keytruda was 2 weeks ago. [place] [facility] [place]   , RVP/COVID, s/p NS 1000 cc,  zosyn    In ED /52   HR 80   RR 18   T 98   98% RA, CT + moderate hydronephrosis, ureteral stent, + acute pyelo and R ureteritis, s/p ceftriaxone 1 g  Admitted to hospital service w sepsis from acute pyelonephritis in pt w bladder CA s/p radical cystectomy w ileal conduit and R ureteral stent through to stoma w mod R hydronephrosis    4/2 -   Patient seen and examined at bedside earlier today, denies cp, dyspnea, parastomal discomfort, denies palpitations    Review of system- Rest of the review of system are negative except mentioned in HPI     Vital sings reviewed for last 24 h  T(C): 37.2 (04-02-24 @ 08:47), Max: 39.2 (04-01-24 @ 21:22)  HR: 71 (04-02-24 @ 08:47) (71 - 102)  BP: 108/49 (04-02-24 @ 08:47) (105/50 - 130/48)  RR: 19 (04-02-24 @ 08:47) (18 - 20)  SpO2: 98% (04-02-24 @ 08:47) (96% - 98%)  CAPILLARY BLOOD GLUCOSE    A1C 7.3  POCT Blood Glucose.: 157 mg/dL (02 Apr 2024 17:08)  POCT Blood Glucose.: 204 mg/dL (02 Apr 2024 11:54)  POCT Blood Glucose.: 194 mg/dL (02 Apr 2024 08:41)  POCT Blood Glucose.: 242 mg/dL (02 Apr 2024 02:14)      Physical exam:   General : NAD, appear to be of stated age , well groomed   NERVOUS SYSTEM:  Alert & Oriented X3, non- focal exam, Motor Strength 5/5 B/L upper and lower extremities; DTRs 2+ intact and symmetric  HEAD:  Atraumatic, Normocephalic  EYES: EOMI, PERRLA, conjunctiva and sclera clear  HEENT: Moist mucous membranes, Supple neck , No JVD  CHEST: Clear to auscultation bilaterally; No rales, no rhonchi, no wheezing  HEART: Regular rate and rhythm; No murmurs, no rubs or gallops  ABDOMEN: Soft, Non-tender, Non-distended; Bowel sounds present, no guarding , no peritoneal irritation , + ileal conduit with yellow urine  GENITOURINARY-  no suprapubic tenderness  EXTREMITIES:  2+ Peripheral Pulses, No clubbing, cyanosis,   edema  MUSCULOSKELETAL:- No muscle tenderness, Muscle tone normal, No joint tenderness, no Joint swelling,  Joint ROM –normal   SKIN-no rash, no lesion    Labs radiologic and other test : all reviewed and interpreted :                         8.7    15.34 )-----------( 201      ( 02 Apr 2024 06:24 )             27.1     04-02    139  |  111<H>  |  28<H>  ----------------------------<  225<H>  3.9   |  21<L>  |  1.42<H>    Ca    8.2<L>      02 Apr 2024 06:24    TPro  7.4  /  Alb  3.5  /  TBili  1.0  /  DBili  x   /  AST  14<L>  /  ALT  21  /  AlkPhos  87  04-01    PT/INR - ( 01 Apr 2024 18:52 )   PT: 11.4 sec;   INR: 1.01 ratio         PTT - ( 01 Apr 2024 18:52 )  PTT:28.7 sec    CT Abdomen and Pelvis w/ IV Cont (04.01.24 @ 21:13) >  IMPRESSION:    1. Status post cystectomy with pelvic/retroperitoneal lymphadenectomy and   right-sided ileal conduit urinary diversion. Right-sided ureteral stent   is seen with proximal end curled in the right renal pelvis and distal and   exiting through the ileal conduit. Moderate right-sided hydronephrosis.   Areas of decreased attenuation are seen throughout the right kidney   suggesting acute pyelonephritis. Right-sided ureteritis is also   suggested. Asymmetric right-sided perinephric fat stranding/fluid.   Mild/moderate left-sided hydroureteronephrosis with enhancement of the   urothelial lining suggesting ureteritis. Subtle area of decreased   attenuation is also seen involving the lower pole of the left kidney   suggesting early acute pyelonephritis.    2. Moderate parastomal hernia containing the cecum without evidence of   obstruction.    3. Gallbladder is of upper limits of normal. Trace pericholecystic fluid.   Findings likely reactive to the right renal pathology. Right upper   quadrant ultrasound recommended for further evaluation, as clinically   warranted.    4. Hepatic steatosis.    5. Tiny/smallhiatal hernia, not seen on prior study.    6. Colonic diverticulosis.       Xray Chest 1 View-PORTABLE IMMEDIATE (04.01.24 @ 18:44) >  IMPRESSION:    No radiographic evidence of active chest disease..    RECENT CULTURES:      Cardiac testing : reviewed   EKG     Procedures :     Devices:     Current medications:  acetaminophen     Tablet .. 650 milliGRAM(s) Oral every 6 hours PRN  aluminum hydroxide/magnesium hydroxide/simethicone Suspension 30 milliLiter(s) Oral every 4 hours PRN  amLODIPine   Tablet 2.5 milliGRAM(s) Oral daily  ascorbic acid 500 milliGRAM(s) Oral daily  aspirin  chewable 81 milliGRAM(s) Oral daily  cholecalciferol 400 Unit(s) Oral daily  dextrose 5%. 1000 milliLiter(s) IV Continuous <Continuous>  dextrose 5%. 1000 milliLiter(s) IV Continuous <Continuous>  dextrose 50% Injectable 12.5 Gram(s) IV Push once  dextrose 50% Injectable 25 Gram(s) IV Push once  dextrose 50% Injectable 25 Gram(s) IV Push once  dextrose Oral Gel 15 Gram(s) Oral once PRN  enoxaparin Injectable 40 milliGRAM(s) SubCutaneous every 24 hours  glucagon  Injectable 1 milliGRAM(s) IntraMuscular once  influenza  Vaccine (HIGH DOSE) 0.7 milliLiter(s) IntraMuscular once  insulin glargine Injectable (LANTUS) 8 Unit(s) SubCutaneous at bedtime  insulin lispro (ADMELOG) corrective regimen sliding scale   SubCutaneous three times a day before meals  insulin lispro (ADMELOG) corrective regimen sliding scale   SubCutaneous at bedtime  losartan 50 milliGRAM(s) Oral daily  magnesium oxide 400 milliGRAM(s) Oral daily  melatonin 3 milliGRAM(s) Oral at bedtime PRN  morphine  - Injectable 2 milliGRAM(s) IV Push every 4 hours PRN  morphine  - Injectable 4 milliGRAM(s) IV Push every 4 hours PRN  multivitamin/minerals 1 Tablet(s) Oral daily  naloxone Injectable 0.4 milliGRAM(s) IV Push once PRN  nebivolol 10 milliGRAM(s) Oral daily  ondansetron Injectable 4 milliGRAM(s) IV Push every 8 hours PRN  piperacillin/tazobactam IVPB.. 3.375 Gram(s) IV Intermittent every 8 hours  senna 2 Tablet(s) Oral at bedtime  sodium chloride 0.9%. 1000 milliLiter(s) IV Continuous <Continuous>            
INTERVAL HPI/OVERNIGHT EVENTS:  Patient S&E at bedside.   Pt with fever this am 101.4 given tylenol   urology consulted, ID rec zosyn - BCx NTD. Today, WBC 9.8, Hb 8, plt 188,    PAST MEDICAL & SURGICAL HISTORY:  DM (diabetes mellitus)      Bladder cancer  Has ilial conduit-urostomy- drainage bag      Hyperlipidemia      Hypertension          FAMILY HISTORY:      VITAL SIGNS:  T(F): 98.5 (04-03-24 @ 09:06)  HR: 79 (04-03-24 @ 09:06)  BP: 127/52 (04-03-24 @ 09:06)  RR: 18 (04-03-24 @ 09:06)  SpO2: 96% (04-03-24 @ 09:06)  Wt(kg): --    PHYSICAL EXAM:    GENERAL: NAD  HEAD:  Atraumatic, Normocephalic  EYES: EOMI  CHEST/LUNG: Clear to auscultation bilaterally; No wheeze  HEART: Regular rate and rhythm;   ABDOMEN: Soft, Nontender,   EXTREMITIES:  ileal conduit   NEUROLOGY: non-focal aox3  SKIN: No rashes or lesions    MEDICATIONS  (STANDING):  amLODIPine   Tablet 2.5 milliGRAM(s) Oral daily  ascorbic acid 500 milliGRAM(s) Oral daily  aspirin  chewable 81 milliGRAM(s) Oral daily  cholecalciferol 400 Unit(s) Oral daily  dextrose 5%. 1000 milliLiter(s) (100 mL/Hr) IV Continuous <Continuous>  dextrose 5%. 1000 milliLiter(s) (50 mL/Hr) IV Continuous <Continuous>  dextrose 50% Injectable 25 Gram(s) IV Push once  dextrose 50% Injectable 12.5 Gram(s) IV Push once  dextrose 50% Injectable 25 Gram(s) IV Push once  enoxaparin Injectable 40 milliGRAM(s) SubCutaneous every 24 hours  glucagon  Injectable 1 milliGRAM(s) IntraMuscular once  influenza  Vaccine (HIGH DOSE) 0.7 milliLiter(s) IntraMuscular once  insulin glargine Injectable (LANTUS) 8 Unit(s) SubCutaneous at bedtime  insulin lispro (ADMELOG) corrective regimen sliding scale   SubCutaneous three times a day before meals  insulin lispro (ADMELOG) corrective regimen sliding scale   SubCutaneous at bedtime  magnesium oxide 400 milliGRAM(s) Oral daily  multivitamin/minerals 1 Tablet(s) Oral daily  nebivolol 10 milliGRAM(s) Oral daily  piperacillin/tazobactam IVPB.. 3.375 Gram(s) IV Intermittent every 8 hours  rosuvastatin 5 milliGRAM(s) Oral at bedtime  senna 2 Tablet(s) Oral at bedtime  sodium chloride 0.9%. 1000 milliLiter(s) (100 mL/Hr) IV Continuous <Continuous>    MEDICATIONS  (PRN):  acetaminophen     Tablet .. 650 milliGRAM(s) Oral every 6 hours PRN Temp greater or equal to 38C (100.4F), Mild Pain (1 - 3)  aluminum hydroxide/magnesium hydroxide/simethicone Suspension 30 milliLiter(s) Oral every 4 hours PRN Dyspepsia  dextrose Oral Gel 15 Gram(s) Oral once PRN Blood Glucose LESS THAN 70 milliGRAM(s)/deciliter  melatonin 3 milliGRAM(s) Oral at bedtime PRN Insomnia  morphine  - Injectable 2 milliGRAM(s) IV Push every 4 hours PRN Moderate Pain (4 - 6)  morphine  - Injectable 4 milliGRAM(s) IV Push every 4 hours PRN Severe Pain (7 - 10)  naloxone Injectable 0.4 milliGRAM(s) IV Push once PRN oversedation  ondansetron Injectable 4 milliGRAM(s) IV Push every 8 hours PRN Nausea and/or Vomiting      Allergies    Levaquin (Unknown)  sulfa drugs (Unknown)  Originally Entered as [Unknown] reaction to [famciclovir] (Unknown)    Intolerances        LABS:                        8.0    9.89  )-----------( 188      ( 03 Apr 2024 06:28 )             24.9     04-03    138  |  111<H>  |  26<H>  ----------------------------<  139<H>  3.9   |  19<L>  |  1.52<H>    Ca    8.2<L>      03 Apr 2024 06:28  Phos  2.8     04-03  Mg     1.9     04-03    TPro  5.9<L>  /  Alb  2.5<L>  /  TBili  0.5  /  DBili  x   /  AST  13<L>  /  ALT  19  /  AlkPhos  64  04-03    PT/INR - ( 01 Apr 2024 18:52 )   PT: 11.4 sec;   INR: 1.01 ratio         PTT - ( 01 Apr 2024 18:52 )  PTT:28.7 sec  Urinalysis Basic - ( 03 Apr 2024 06:28 )    Color: x / Appearance: x / SG: x / pH: x  Gluc: 139 mg/dL / Ketone: x  / Bili: x / Urobili: x   Blood: x / Protein: x / Nitrite: x   Leuk Esterase: x / RBC: x / WBC x   Sq Epi: x / Non Sq Epi: x / Bacteria: x        RADIOLOGY & ADDITIONAL TESTS:  Studies reviewed.  
INTERVAL HPI/OVERNIGHT EVENTS:  Patient S&E at bedside. No o/n events, patient resting comfortably. No complaints at this time.   diarrhea has improved     VITAL SIGNS:  T(F): 98.8 (04-06-24 @ 08:19)  HR: 61 (04-06-24 @ 08:19)  BP: 166/61 (04-06-24 @ 08:19)  RR: 16 (04-06-24 @ 08:19)  SpO2: 98% (04-06-24 @ 08:19)  Wt(kg): --    PHYSICAL EXAM:    Constitutional: NAD  Eyes: EOMI, sclera non-icteric  Neck: supple, no masses, no JVD  Respiratory: CTA b/l, good air entry b/l  Cardiovascular: RRR, no M/R/G  Gastrointestinal: soft, NTND, no masses palpable, + BS, no hepatosplenomegaly; ostomy ok   Extremities: no c/c/e  Neurological: AAOx3      MEDICATIONS  (STANDING):  amLODIPine   Tablet 2.5 milliGRAM(s) Oral daily  ascorbic acid 500 milliGRAM(s) Oral daily  aspirin  chewable 81 milliGRAM(s) Oral daily  cefTRIAXone Injectable. 2000 milliGRAM(s) IV Push every 24 hours  cholecalciferol 1000 Unit(s) Oral at bedtime  dextrose 5%. 1000 milliLiter(s) (50 mL/Hr) IV Continuous <Continuous>  dextrose 5%. 1000 milliLiter(s) (100 mL/Hr) IV Continuous <Continuous>  dextrose 50% Injectable 12.5 Gram(s) IV Push once  dextrose 50% Injectable 25 Gram(s) IV Push once  dextrose 50% Injectable 25 Gram(s) IV Push once  enoxaparin Injectable 40 milliGRAM(s) SubCutaneous every 24 hours  glucagon  Injectable 1 milliGRAM(s) IntraMuscular once  influenza  Vaccine (HIGH DOSE) 0.7 milliLiter(s) IntraMuscular once  insulin glargine Injectable (LANTUS) 8 Unit(s) SubCutaneous at bedtime  insulin lispro (ADMELOG) corrective regimen sliding scale   SubCutaneous three times a day before meals  insulin lispro (ADMELOG) corrective regimen sliding scale   SubCutaneous at bedtime  lactobacillus acidophilus 1 Tablet(s) Oral three times a day with meals  losartan 25 milliGRAM(s) Oral daily  multivitamin/minerals 1 Tablet(s) Oral daily  nebivolol 10 milliGRAM(s) Oral daily  rosuvastatin 5 milliGRAM(s) Oral at bedtime    MEDICATIONS  (PRN):  acetaminophen     Tablet .. 650 milliGRAM(s) Oral every 6 hours PRN Temp greater or equal to 38C (100.4F), Mild Pain (1 - 3)  aluminum hydroxide/magnesium hydroxide/simethicone Suspension 30 milliLiter(s) Oral every 4 hours PRN Dyspepsia  dextrose Oral Gel 15 Gram(s) Oral once PRN Blood Glucose LESS THAN 70 milliGRAM(s)/deciliter  hydrALAZINE Injectable 5 milliGRAM(s) IV Push two times a day PRN SBP > 160  melatonin 3 milliGRAM(s) Oral at bedtime PRN Insomnia  morphine  - Injectable 4 milliGRAM(s) IV Push every 4 hours PRN Severe Pain (7 - 10)  morphine  - Injectable 2 milliGRAM(s) IV Push every 4 hours PRN Moderate Pain (4 - 6)  naloxone Injectable 0.4 milliGRAM(s) IV Push once PRN oversedation  ondansetron Injectable 4 milliGRAM(s) IV Push every 8 hours PRN Nausea and/or Vomiting      Allergies    Levaquin (Unknown)  sulfa drugs (Unknown)  Originally Entered as [Unknown] reaction to [famciclovir] (Unknown)    Intolerances        LABS:                        8.3    7.32  )-----------( 229      ( 06 Apr 2024 06:52 )             25.5     04-06    144  |  116<H>  |  17  ----------------------------<  139<H>  3.5   |  23  |  1.27    Ca    9.0      06 Apr 2024 06:52  Phos  3.6     04-06  Mg     1.6     04-06    TPro  6.2  /  Alb  2.7<L>  /  TBili  0.4  /  DBili  x   /  AST  23  /  ALT  33  /  AlkPhos  73  04-05      Urinalysis Basic - ( 06 Apr 2024 06:52 )    Color: x / Appearance: x / SG: x / pH: x  Gluc: 139 mg/dL / Ketone: x  / Bili: x / Urobili: x   Blood: x / Protein: x / Nitrite: x   Leuk Esterase: x / RBC: x / WBC x   Sq Epi: x / Non Sq Epi: x / Bacteria: x        RADIOLOGY & ADDITIONAL TESTS:  Studies reviewed.    ASSESSMENT & PLAN: 
Subjective:  Chief complain :  fevers    HPI:  73 year old female w hx bladder cancer s/p resection w ileal conduit w stent, on Keytruda, DM, HLD, HTN [place] [facility] [place]. Yesterday + right flank pain intermittent then progressed to more severe, woke up w fever T 101.9  , +fevers today, took tylenol 1000mg at 13:00 today. [place] [facility] [place] was told WBC 18K  last dose Keytruda was 2 weeks ago. [place] [facility] [place]   , RVP/COVID, s/p NS 1000 cc,  zosyn    In ED /52   HR 80   RR 18   T 98   98% RA, CT + moderate hydronephrosis, ureteral stent, + acute pyelo and R ureteritis, s/p ceftriaxone 1 g  Admitted to hospital service w sepsis from acute pyelonephritis in pt w bladder CA s/p radical cystectomy w ileal conduit and R ureteral stent through to stoma w mod R hydronephrosis    4/2 -   Patient seen and examined at bedside earlier today, denies cp, dyspnea, parastomal discomfort, denies palpitations  4/3 -  febrile, denies cp, dyspnea, abdominal pain, tolerating IV abx, po diet, + bm, plan discussed  4/4 - afebrile, multiple lose bm, was taking senna and magnesium, denies  abdominal pain, cp, dyspnea, tolerating IV abx  4/5 - + ankle mild edema, + lose stools, denies abdominal pain, tolerating po intake, plan discussed     Review of system- Rest of the review of system are negative except mentioned in HPI    Vital sings reviewed for last 24 h  T(C): 36.9 (04-05-24 @ 14:54), Max: 37.3 (04-04-24 @ 21:06)  T(F): 98.4 (04-05-24 @ 14:54), Max: 99.2 (04-04-24 @ 21:06)  HR: 65 (04-05-24 @ 14:54) (64 - 78)  BP: 139/57 (04-05-24 @ 14:54) (139/57 - 159/58)  RR: 18 (04-05-24 @ 14:54) (17 - 18)  SpO2: 99% (04-05-24 @ 14:54) (96% - 99%)  Wt(kg): --  Daily     Daily   CAPILLARY BLOOD GLUCOSE      POCT Blood Glucose.: 226 mg/dL (05 Apr 2024 17:50)  POCT Blood Glucose.: 132 mg/dL (05 Apr 2024 12:34)  POCT Blood Glucose.: 117 mg/dL (05 Apr 2024 07:50)  POCT Blood Glucose.: 173 mg/dL (04 Apr 2024 22:26)        Physical exam:   General : NAD, appear to be of stated age , well groomed   NERVOUS SYSTEM:  Alert & Oriented X3, non- focal exam, Motor Strength 5/5 B/L upper and lower extremities; DTRs 2+ intact and symmetric  HEAD:  Atraumatic, Normocephalic  EYES: EOMI, PERRLA, conjunctiva and sclera clear  HEENT: Moist mucous membranes, Supple neck , No JVD  CHEST: Clear to auscultation bilaterally; No rales, no rhonchi, no wheezing  HEART: Regular rate and rhythm; No murmurs, no rubs or gallops  ABDOMEN: Soft, Non-tender, Non-distended; Bowel sounds present, no guarding , no peritoneal irritation , + ileal conduit with yellow urine  GENITOURINARY-  no suprapubic tenderness  EXTREMITIES:  2+ Peripheral Pulses, No clubbing, cyanosis,   edema  MUSCULOSKELETAL:- No muscle tenderness, Muscle tone normal, No joint tenderness, no Joint swelling,  Joint ROM –normal   SKIN-no rash, no lesion    Labs radiologic and other test : all reviewed and interpreted :                           8.2    7.00  )-----------( 223      ( 05 Apr 2024 07:33 )             25.7     04-05    142  |  115<H>  |  17  ----------------------------<  110<H>  3.7   |  19<L>  |  1.17    Ca    8.6      05 Apr 2024 07:33  Phos  3.6     04-05  Mg     1.8     04-05    TPro  6.2  /  Alb  2.7<L>  /  TBili  0.4  /  DBili  x   /  AST  23  /  ALT  33  /  AlkPhos  73  04-05        LIVER FUNCTIONS - ( 05 Apr 2024 07:33 )  Alb: 2.7 g/dL / Pro: 6.2 gm/dL / ALK PHOS: 73 U/L / ALT: 33 U/L / AST: 23 U/L / GGT: x             Culture - Blood (04.01.24 @ 18:52)    -  K. pneumoniae group: Detec (K. pneumoniae, K. quasipneumoniae, K. variicola)   Gram Stain:   Growth in aerobic bottle: Gram Negative Rods   -  Ampicillin: R >16 These ampicillin results predict results for amoxicillin   -  Ampicillin/Sulbactam: S 8/4   -  Aztreonam: S <=4   -  Cefazolin: S <=2   -  Cefepime: S <=2   -  Cefoxitin: S <=8   -  Ceftriaxone: S <=1   -  Ciprofloxacin: S <=0.25   -  Ertapenem: S <=0.5   -  Gentamicin: S <=2   -  Imipenem: S <=1   -  Levofloxacin: S <=0.5   -  Meropenem: S <=1   -  Piperacillin/Tazobactam: S <=8   -  Tobramycin: S <=2   -  Trimethoprim/Sulfamethoxazole: S <=0.5/9.5   Specimen Source: .Blood None   Organism: Blood Culture PCR   Organism: Klebsiella pneumoniae   Culture Results:   Growth in aerobic bottle: Klebsiella pneumoniae  Direct identification is available within approximately 3-5  hours either by Blood Panel Multiplexed PCR or Direct  MALDI-TOF. Details: [email or web address]   Organism Identification: Blood Culture PCR  Klebsiella pneumoniae   Method Type: PCR   Method Type: LISA            Culture - Urine (04.01.24 @ 18:52)    Specimen Source: Clean Catch None   Culture Results:   >=3 organisms. Probable collection contamination.    Culture - Blood (04.01.24 @ 18:52)    -  K. pneumoniae group: Detec (K. pneumoniae, K. quasipneumoniae, K. variicola)   Gram Stain:   Growth in aerobic bottle: Gram Negative Rods   Specimen Source: .Blood None   Organism: Blood Culture PCR   Culture Results:   Growth in aerobic bottle: Klebsiella pneumoniae  Direct identification is available within approximately 3-5  hours either by Blood Panel Multiplexed PCR or Direct  MALDI-TOF. Details: [email or web address]   Organism Identification: Blood Culture PCR   Method Type: PCR                  CT Abdomen and Pelvis w/ IV Cont (04.01.24 @ 21:13) >  IMPRESSION:    1. Status post cystectomy with pelvic/retroperitoneal lymphadenectomy and   right-sided ileal conduit urinary diversion. Right-sided ureteral stent   is seen with proximal end curled in the right renal pelvis and distal and   exiting through the ileal conduit. Moderate right-sided hydronephrosis.   Areas of decreased attenuation are seen throughout the right kidney   suggesting acute pyelonephritis. Right-sided ureteritis is also   suggested. Asymmetric right-sided perinephric fat stranding/fluid.   Mild/moderate left-sided hydroureteronephrosis with enhancement of the   urothelial lining suggesting ureteritis. Subtle area of decreased   attenuation is also seen involving the lower pole of the left kidney   suggesting early acute pyelonephritis.    2. Moderate parastomal hernia containing the cecum without evidence of   obstruction.    3. Gallbladder is of upper limits of normal. Trace pericholecystic fluid.   Findings likely reactive to the right renal pathology. Right upper   quadrant ultrasound recommended for further evaluation, as clinically   warranted.    4. Hepatic steatosis.    5. Tiny/smallhiatal hernia, not seen on prior study.    6. Colonic diverticulosis.       Xray Chest 1 View-PORTABLE IMMEDIATE (04.01.24 @ 18:44) >  IMPRESSION:    No radiographic evidence of active chest disease..    RECENT CULTURES:    Culture - Urine (04.01.24 @ 18:52)    Specimen Source: Clean Catch None   Culture Results:   >=3 organisms. Probable collection contamination.    Culture - Blood (04.01.24 @ 18:52)    -  K. pneumoniae group: Detec (K. pneumoniae, K. quasipneumoniae, K. variicola)   Gram Stain:   Growth in aerobic bottle: Gram Negative Rods   Specimen Source: .Blood None   Organism: Blood Culture PCR   Culture Results:   Growth in aerobic bottle: Gram Negative Rods  Direct identification is available within approximately 3-5  hours either by Blood Panel Multiplexed PCR or Direct  MALDI-TOF. Details: [email or web address]   Organism Identification: Blood Culture PCR   Method Type: PCR          Cardiac testing : reviewed   EKG   < from: 12 Lead ECG (04.02.24 @ 07:35) >    Ventricular Rate 65 BPM    Atrial Rate 65 BPM    P-R Interval 162 ms    QRS Duration 86 ms    Q-T Interval 390 ms    QTC Calculation(Bazett) 405 ms    P Axis 47 degrees    R Axis -2 degrees    T Axis 7 degrees    Diagnosis Line Normal sinus rhythm  Normal ECG  When compared with ECG of 16-SEP-2022 19:13,  No significant change was found    < end of copied text >    Procedures :     Devices:     Current medications:  acetaminophen     Tablet .. 650 milliGRAM(s) Oral every 6 hours PRN  aluminum hydroxide/magnesium hydroxide/simethicone Suspension 30 milliLiter(s) Oral every 4 hours PRN  amLODIPine   Tablet 2.5 milliGRAM(s) Oral daily  ascorbic acid 500 milliGRAM(s) Oral daily  aspirin  chewable 81 milliGRAM(s) Oral daily  cholecalciferol 400 Unit(s) Oral daily  dextrose 5%. 1000 milliLiter(s) IV Continuous <Continuous>  dextrose 5%. 1000 milliLiter(s) IV Continuous <Continuous>  dextrose 50% Injectable 12.5 Gram(s) IV Push once  dextrose 50% Injectable 25 Gram(s) IV Push once  dextrose 50% Injectable 25 Gram(s) IV Push once  dextrose Oral Gel 15 Gram(s) Oral once PRN  enoxaparin Injectable 40 milliGRAM(s) SubCutaneous every 24 hours  glucagon  Injectable 1 milliGRAM(s) IntraMuscular once  influenza  Vaccine (HIGH DOSE) 0.7 milliLiter(s) IntraMuscular once  insulin glargine Injectable (LANTUS) 8 Unit(s) SubCutaneous at bedtime  insulin lispro (ADMELOG) corrective regimen sliding scale   SubCutaneous three times a day before meals  insulin lispro (ADMELOG) corrective regimen sliding scale   SubCutaneous at bedtime  losartan 50 milliGRAM(s) Oral daily  magnesium oxide 400 milliGRAM(s) Oral daily  melatonin 3 milliGRAM(s) Oral at bedtime PRN  morphine  - Injectable 2 milliGRAM(s) IV Push every 4 hours PRN  morphine  - Injectable 4 milliGRAM(s) IV Push every 4 hours PRN  multivitamin/minerals 1 Tablet(s) Oral daily  naloxone Injectable 0.4 milliGRAM(s) IV Push once PRN  nebivolol 10 milliGRAM(s) Oral daily  ondansetron Injectable 4 milliGRAM(s) IV Push every 8 hours PRN  piperacillin/tazobactam IVPB.. 3.375 Gram(s) IV Intermittent every 8 hours  senna 2 Tablet(s) Oral at bedtime  sodium chloride 0.9%. 1000 milliLiter(s) IV Continuous <Continuous>            
Subjective:  Chief complain :  fevers    HPI:  73 year old female w hx bladder cancer s/p resection w ileal conduit w stent, on Keytruda, DM, HLD, HTN presents to ED BIBEMS from [facility] c/o fevers. Yesterday + right flank pain intermittent then progressed to more severe, woke up w fever T 101.9  , +fevers today, took tylenol 1000mg at 13:00 today. Went to [facility] for blood work and was told WBC 18K  last dose Keytruda was 2 weeks ago. AT [facility] : blood cx x 2   , RVP/COVID, s/p NS 1000 cc,  zosyn    In ED /52   HR 80   RR 18   T 98   98% RA, CT + moderate hydronephrosis, ureteral stent, + acute pyelo and R ureteritis, s/p ceftriaxone 1 g  Admitted to hospital service w sepsis from acute pyelonephritis in pt w bladder CA s/p radical cystectomy w ileal conduit and R ureteral stent through to stoma w mod R hydronephrosis    4/2 -   Patient seen and examined at bedside earlier today, denies cp, dyspnea, parastomal discomfort, denies palpitations  4/3 -  febrile, denies cp, dyspnea, abdominal pain, tolerating IV abx, po diet, + bm, plan discussed    Review of system- Rest of the review of system are negative except mentioned in HPI    Vital Signs reviewed in  Last 24 Hrs  T(C): 36.5 (03 Apr 2024 15:25), Max: 38.6 (03 Apr 2024 05:25)  T(F): 97.7 (03 Apr 2024 15:25), Max: 101.4 (03 Apr 2024 05:25)  HR: 65 (03 Apr 2024 15:25) (65 - 79)  BP: 124/63 (03 Apr 2024 15:25) (116/52 - 127/52)  BP(mean): --  ABP: --  ABP(mean): --  RR: 18 (03 Apr 2024 15:25) (18 - 18)  SpO2: 95% (03 Apr 2024 15:25) (95% - 96%)  O2 Parameters below as of 03 Apr 2024 15:25  Patient On (Oxygen Delivery Method): room air            Physical exam:   General : NAD, appear to be of stated age , well groomed   NERVOUS SYSTEM:  Alert & Oriented X3, non- focal exam, Motor Strength 5/5 B/L upper and lower extremities; DTRs 2+ intact and symmetric  HEAD:  Atraumatic, Normocephalic  EYES: EOMI, PERRLA, conjunctiva and sclera clear  HEENT: Moist mucous membranes, Supple neck , No JVD  CHEST: Clear to auscultation bilaterally; No rales, no rhonchi, no wheezing  HEART: Regular rate and rhythm; No murmurs, no rubs or gallops  ABDOMEN: Soft, Non-tender, Non-distended; Bowel sounds present, no guarding , no peritoneal irritation , + ileal conduit with yellow urine  GENITOURINARY-  no suprapubic tenderness  EXTREMITIES:  2+ Peripheral Pulses, No clubbing, cyanosis,   edema  MUSCULOSKELETAL:- No muscle tenderness, Muscle tone normal, No joint tenderness, no Joint swelling,  Joint ROM –normal   SKIN-no rash, no lesion    Labs radiologic and other test : all reviewed and interpreted :                           8.0    9.89  )-----------( 188      ( 03 Apr 2024 06:28 )             24.9     04-03    138  |  111<H>  |  26<H>  ----------------------------<  139<H>  3.9   |  19<L>  |  1.52<H>    Ca    8.2<L>      03 Apr 2024 06:28  Phos  2.8     04-03  Mg     1.9     04-03    TPro  5.9<L>  /  Alb  2.5<L>  /  TBili  0.5  /  DBili  x   /  AST  13<L>  /  ALT  19  /  AlkPhos  64  04-03        LIVER FUNCTIONS - ( 03 Apr 2024 06:28 )  Alb: 2.5 g/dL / Pro: 5.9 gm/dL / ALK PHOS: 64 U/L / ALT: 19 U/L / AST: 13 U/L / GGT: x                 CT Abdomen and Pelvis w/ IV Cont (04.01.24 @ 21:13) >  IMPRESSION:    1. Status post cystectomy with pelvic/retroperitoneal lymphadenectomy and   right-sided ileal conduit urinary diversion. Right-sided ureteral stent   is seen with proximal end curled in the right renal pelvis and distal and   exiting through the ileal conduit. Moderate right-sided hydronephrosis.   Areas of decreased attenuation are seen throughout the right kidney   suggesting acute pyelonephritis. Right-sided ureteritis is also   suggested. Asymmetric right-sided perinephric fat stranding/fluid.   Mild/moderate left-sided hydroureteronephrosis with enhancement of the   urothelial lining suggesting ureteritis. Subtle area of decreased   attenuation is also seen involving the lower pole of the left kidney   suggesting early acute pyelonephritis.    2. Moderate parastomal hernia containing the cecum without evidence of   obstruction.    3. Gallbladder is of upper limits of normal. Trace pericholecystic fluid.   Findings likely reactive to the right renal pathology. Right upper   quadrant ultrasound recommended for further evaluation, as clinically   warranted.    4. Hepatic steatosis.    5. Tiny/smallhiatal hernia, not seen on prior study.    6. Colonic diverticulosis.       Xray Chest 1 View-PORTABLE IMMEDIATE (04.01.24 @ 18:44) >  IMPRESSION:    No radiographic evidence of active chest disease..    RECENT CULTURES:    Culture - Urine (04.01.24 @ 18:52)    Specimen Source: Clean Catch None   Culture Results:   >=3 organisms. Probable collection contamination.    Culture - Blood (04.01.24 @ 18:52)    -  K. pneumoniae group: Detec (K. pneumoniae, K. quasipneumoniae, K. variicola)   Gram Stain:   Growth in aerobic bottle: Gram Negative Rods   Specimen Source: .Blood None   Organism: Blood Culture PCR   Culture Results:   Growth in aerobic bottle: Gram Negative Rods  Direct identification is available within approximately 3-5  hours either by Blood Panel Multiplexed PCR or Direct  MALDI-TOF. Details: [email or web address]   Organism Identification: Blood Culture PCR   Method Type: PCR          Cardiac testing : reviewed   EKG   < from: 12 Lead ECG (04.02.24 @ 07:35) >    Ventricular Rate 65 BPM    Atrial Rate 65 BPM    P-R Interval 162 ms    QRS Duration 86 ms    Q-T Interval 390 ms    QTC Calculation(Bazett) 405 ms    P Axis 47 degrees    R Axis -2 degrees    T Axis 7 degrees    Diagnosis Line Normal sinus rhythm  Normal ECG  When compared with ECG of 16-SEP-2022 19:13,  No significant change was found    < end of copied text >    Procedures :     Devices:     Current medications:  acetaminophen     Tablet .. 650 milliGRAM(s) Oral every 6 hours PRN  aluminum hydroxide/magnesium hydroxide/simethicone Suspension 30 milliLiter(s) Oral every 4 hours PRN  amLODIPine   Tablet 2.5 milliGRAM(s) Oral daily  ascorbic acid 500 milliGRAM(s) Oral daily  aspirin  chewable 81 milliGRAM(s) Oral daily  cholecalciferol 400 Unit(s) Oral daily  dextrose 5%. 1000 milliLiter(s) IV Continuous <Continuous>  dextrose 5%. 1000 milliLiter(s) IV Continuous <Continuous>  dextrose 50% Injectable 12.5 Gram(s) IV Push once  dextrose 50% Injectable 25 Gram(s) IV Push once  dextrose 50% Injectable 25 Gram(s) IV Push once  dextrose Oral Gel 15 Gram(s) Oral once PRN  enoxaparin Injectable 40 milliGRAM(s) SubCutaneous every 24 hours  glucagon  Injectable 1 milliGRAM(s) IntraMuscular once  influenza  Vaccine (HIGH DOSE) 0.7 milliLiter(s) IntraMuscular once  insulin glargine Injectable (LANTUS) 8 Unit(s) SubCutaneous at bedtime  insulin lispro (ADMELOG) corrective regimen sliding scale   SubCutaneous three times a day before meals  insulin lispro (ADMELOG) corrective regimen sliding scale   SubCutaneous at bedtime  losartan 50 milliGRAM(s) Oral daily  magnesium oxide 400 milliGRAM(s) Oral daily  melatonin 3 milliGRAM(s) Oral at bedtime PRN  morphine  - Injectable 2 milliGRAM(s) IV Push every 4 hours PRN  morphine  - Injectable 4 milliGRAM(s) IV Push every 4 hours PRN  multivitamin/minerals 1 Tablet(s) Oral daily  naloxone Injectable 0.4 milliGRAM(s) IV Push once PRN  nebivolol 10 milliGRAM(s) Oral daily  ondansetron Injectable 4 milliGRAM(s) IV Push every 8 hours PRN  piperacillin/tazobactam IVPB.. 3.375 Gram(s) IV Intermittent every 8 hours  senna 2 Tablet(s) Oral at bedtime  sodium chloride 0.9%. 1000 milliLiter(s) IV Continuous <Continuous>            
no new events     acetaminophen     Tablet .. 650 milliGRAM(s) Oral every 6 hours PRN  aluminum hydroxide/magnesium hydroxide/simethicone Suspension 30 milliLiter(s) Oral every 4 hours PRN  amLODIPine   Tablet 2.5 milliGRAM(s) Oral daily  ascorbic acid 500 milliGRAM(s) Oral daily  aspirin  chewable 81 milliGRAM(s) Oral daily  cholecalciferol 400 Unit(s) Oral daily  dextrose 5%. 1000 milliLiter(s) IV Continuous <Continuous>  dextrose 5%. 1000 milliLiter(s) IV Continuous <Continuous>  dextrose 50% Injectable 25 Gram(s) IV Push once  dextrose 50% Injectable 12.5 Gram(s) IV Push once  dextrose 50% Injectable 25 Gram(s) IV Push once  dextrose Oral Gel 15 Gram(s) Oral once PRN  enoxaparin Injectable 40 milliGRAM(s) SubCutaneous every 24 hours  glucagon  Injectable 1 milliGRAM(s) IntraMuscular once  hydrALAZINE Injectable 5 milliGRAM(s) IV Push two times a day PRN  influenza  Vaccine (HIGH DOSE) 0.7 milliLiter(s) IntraMuscular once  insulin glargine Injectable (LANTUS) 8 Unit(s) SubCutaneous at bedtime  insulin lispro (ADMELOG) corrective regimen sliding scale   SubCutaneous at bedtime  insulin lispro (ADMELOG) corrective regimen sliding scale   SubCutaneous three times a day before meals  lactobacillus acidophilus 1 Tablet(s) Oral three times a day with meals  losartan 25 milliGRAM(s) Oral daily  melatonin 3 milliGRAM(s) Oral at bedtime PRN  morphine  - Injectable 4 milliGRAM(s) IV Push every 4 hours PRN  morphine  - Injectable 2 milliGRAM(s) IV Push every 4 hours PRN  multivitamin/minerals 1 Tablet(s) Oral daily  naloxone Injectable 0.4 milliGRAM(s) IV Push once PRN  nebivolol 10 milliGRAM(s) Oral daily  ondansetron Injectable 4 milliGRAM(s) IV Push every 8 hours PRN  piperacillin/tazobactam IVPB.. 3.375 Gram(s) IV Intermittent every 8 hours  rosuvastatin 5 milliGRAM(s) Oral at bedtime      Levaquin (Unknown)  sulfa drugs (Unknown)  Originally Entered as [Unknown] reaction to [famciclovir] (Unknown)      ROS otherwise negative     T(C): 37.1 (04-05-24 @ 09:17), Max: 37.3 (04-04-24 @ 21:06)  HR: 64 (04-05-24 @ 09:17) (64 - 78)  BP: 159/58 (04-05-24 @ 09:17) (134/50 - 159/58)  RR: 18 (04-05-24 @ 09:17) (17 - 18)  SpO2: 99% (04-05-24 @ 09:17) (96% - 99%)  PHYSICAL EXAM  Gen:  laying in bed, nad  H:  anicteric, eomi  CV:  RRR, S1, S2  Lungs:  CTA b/l  Ab soft/nt/nd  Ext:  no edema                          8.2    7.00  )-----------( 223      ( 05 Apr 2024 07:33 )             25.7                         8.7    8.60  )-----------( 240      ( 04 Apr 2024 05:53 )             27.4                         8.0    9.89  )-----------( 188      ( 03 Apr 2024 06:28 )             24.9   04-05    142  |  115<H>  |  17  ----------------------------<  110<H>  3.7   |  19<L>  |  1.17    Ca    8.6      05 Apr 2024 07:33  Phos  3.6     04-05  Mg     1.8     04-05    TPro  6.2  /  Alb  2.7<L>  /  TBili  0.4  /  DBili  x   /  AST  23  /  ALT  33  /  AlkPhos  73  04-05  Culture - Urine (04.01.24 @ 18:52)    Specimen Source: Clean Catch None   Culture Results:   >=3 organisms. Probable collection contamination.    Culture - Blood (04.01.24 @ 18:52)    -  K. pneumoniae group: Detec (K. pneumoniae, K. quasipneumoniae, K. variicola)   Gram Stain:   Growth in aerobic bottle: Gram Negative Rods   -  Ampicillin: R >16 These ampicillin results predict results for amoxicillin   -  Ampicillin/Sulbactam: S 8/4   -  Aztreonam: S <=4   -  Cefazolin: S <=2   -  Cefepime: S <=2   -  Cefoxitin: S <=8   -  Ceftriaxone: S <=1   -  Ciprofloxacin: S <=0.25   -  Ertapenem: S <=0.5   -  Gentamicin: S <=2   -  Imipenem: S <=1   -  Levofloxacin: S <=0.5   -  Meropenem: S <=1   -  Piperacillin/Tazobactam: S <=8   -  Tobramycin: S <=2   -  Trimethoprim/Sulfamethoxazole: S <=0.5/9.5   Specimen Source: .Blood None   Organism: Blood Culture PCR   Organism: Klebsiella pneumoniae   Culture Results:   Growth in aerobic bottle: Klebsiella pneumoniae  Direct identification is available within approximately 3-5  hours either by Blood Panel Multiplexed PCR or Direct  MALDI-TOF. Details: [email or web address]   Organism Identification: Blood Culture PCR  Klebsiella pneumoniae   Method Type: PCR   Method Type: LISA      
Subjective:  Chief complain :  fevers    HPI:  73 year old female w hx bladder cancer s/p resection w ileal conduit w stent, on Keytruda, DM, HLD, HTN presents to ED BIBEMS from [facility] c/o fevers. Yesterday + right flank pain intermittent then progressed to more severe, woke up w fever T 101.9  , +fevers today, took tylenol 1000mg at 13:00 today. Went to [facility] for blood work and was told WBC 18K  last dose Keytruda was 2 weeks ago. AT [facility] : blood cx x 2   , RVP/COVID, s/p NS 1000 cc,  zosyn    In ED /52   HR 80   RR 18   T 98   98% RA, CT + moderate hydronephrosis, ureteral stent, + acute pyelo and R ureteritis, s/p ceftriaxone 1 g  Admitted to hospital service w sepsis from acute pyelonephritis in pt w bladder CA s/p radical cystectomy w ileal conduit and R ureteral stent through to stoma w mod R hydronephrosis    4/2 -   Patient seen and examined at bedside earlier today, denies cp, dyspnea, parastomal discomfort, denies palpitations  4/3 -  febrile, denies cp, dyspnea, abdominal pain, tolerating IV abx, po diet, + bm, plan discussed  4/4 - afebrile, multiple lose bm, was taking senna and magnesium, denies  abdominal pain, cp, dyspnea, tolerating IV abx  4/5 - + ankle mild edema, + lose stools, denies abdominal pain, tolerating po intake, plan discussed   4/6-  multiple watery stools in am, denies abdominal pain, denies cp, dyspnea afebrile    Review of system- Rest of the review of system are negative except mentioned in HPI    Vital Signs reviewed in  Last 24 Hrs  T(C): 37.1 (06 Apr 2024 08:19), Max: 37.3 (05 Apr 2024 22:13)  T(F): 98.8 (06 Apr 2024 08:19), Max: 99.2 (05 Apr 2024 22:13)  HR: 61 (06 Apr 2024 08:19) (61 - 65)  BP: 166/61 (06 Apr 2024 08:19) (139/57 - 166/61)  BP(mean): --  ABP: --  ABP(mean): --  RR: 16 (06 Apr 2024 08:19) (16 - 18)  SpO2: 98% (06 Apr 2024 08:19) (95% - 99%)  O2 Parameters below as of 06 Apr 2024 08:19  Patient On (Oxygen Delivery Method): room air                Physical exam:   General : NAD, appear to be of stated age , well groomed   NERVOUS SYSTEM:  Alert & Oriented X3, non- focal exam, Motor Strength 5/5 B/L upper and lower extremities; DTRs 2+ intact and symmetric  HEAD:  Atraumatic, Normocephalic  EYES: EOMI, PERRLA, conjunctiva and sclera clear  HEENT: Moist mucous membranes, Supple neck , No JVD  CHEST: Clear to auscultation bilaterally; No rales, no rhonchi, no wheezing  HEART: Regular rate and rhythm; No murmurs, no rubs or gallops  ABDOMEN: Soft, Non-tender, Non-distended; Bowel sounds present, no guarding , no peritoneal irritation , + ileal conduit with yellow urine  GENITOURINARY-  no suprapubic tenderness  EXTREMITIES:  2+ Peripheral Pulses, No clubbing, cyanosis,   edema  MUSCULOSKELETAL:- No muscle tenderness, Muscle tone normal, No joint tenderness, no Joint swelling,  Joint ROM –normal   SKIN-no rash, no lesion    Labs radiologic and other test : all reviewed and interpreted :                           8.3    7.32  )-----------( 229      ( 06 Apr 2024 06:52 )             25.5     04-06    144  |  116<H>  |  17  ----------------------------<  139<H>  3.5   |  23  |  1.27    Ca    9.0      06 Apr 2024 06:52  Phos  3.6     04-06  Mg     1.6     04-06    TPro  6.2  /  Alb  2.7<L>  /  TBili  0.4  /  DBili  x   /  AST  23  /  ALT  33  /  AlkPhos  73  04-05        LIVER FUNCTIONS - ( 05 Apr 2024 07:33 )  Alb: 2.7 g/dL / Pro: 6.2 gm/dL / ALK PHOS: 73 U/L / ALT: 33 U/L / AST: 23 U/L / GGT: x                                   Culture - Blood (04.01.24 @ 18:52)    -  K. pneumoniae group: Detec (K. pneumoniae, K. quasipneumoniae, K. variicola)   Gram Stain:   Growth in aerobic bottle: Gram Negative Rods   -  Ampicillin: R >16 These ampicillin results predict results for amoxicillin   -  Ampicillin/Sulbactam: S 8/4   -  Aztreonam: S <=4   -  Cefazolin: S <=2   -  Cefepime: S <=2   -  Cefoxitin: S <=8   -  Ceftriaxone: S <=1   -  Ciprofloxacin: S <=0.25   -  Ertapenem: S <=0.5   -  Gentamicin: S <=2   -  Imipenem: S <=1   -  Levofloxacin: S <=0.5   -  Meropenem: S <=1   -  Piperacillin/Tazobactam: S <=8   -  Tobramycin: S <=2   -  Trimethoprim/Sulfamethoxazole: S <=0.5/9.5   Specimen Source: .Blood None   Organism: Blood Culture PCR   Organism: Klebsiella pneumoniae   Culture Results:   Growth in aerobic bottle: Klebsiella pneumoniae  Direct identification is available within approximately 3-5  hours either by Blood Panel Multiplexed PCR or Direct  MALDI-TOF. Details: [email or web address]   Organism Identification: Blood Culture PCR  Klebsiella pneumoniae   Method Type: PCR   Method Type: LISA            Culture - Urine (04.01.24 @ 18:52)    Specimen Source: Clean Catch None   Culture Results:   >=3 organisms. Probable collection contamination.    Culture - Blood (04.01.24 @ 18:52)    -  K. pneumoniae group: Detec (K. pneumoniae, K. quasipneumoniae, K. variicola)   Gram Stain:   Growth in aerobic bottle: Gram Negative Rods   Specimen Source: .Blood None   Organism: Blood Culture PCR   Culture Results:   Growth in aerobic bottle: Klebsiella pneumoniae  Direct identification is available within approximately 3-5  hours either by Blood Panel Multiplexed PCR or Direct  MALDI-TOF. Details: [email or web address]   Organism Identification: Blood Culture PCR   Method Type: PCR                  CT Abdomen and Pelvis w/ IV Cont (04.01.24 @ 21:13) >  IMPRESSION:    1. Status post cystectomy with pelvic/retroperitoneal lymphadenectomy and   right-sided ileal conduit urinary diversion. Right-sided ureteral stent   is seen with proximal end curled in the right renal pelvis and distal and   exiting through the ileal conduit. Moderate right-sided hydronephrosis.   Areas of decreased attenuation are seen throughout the right kidney   suggesting acute pyelonephritis. Right-sided ureteritis is also   suggested. Asymmetric right-sided perinephric fat stranding/fluid.   Mild/moderate left-sided hydroureteronephrosis with enhancement of the   urothelial lining suggesting ureteritis. Subtle area of decreased   attenuation is also seen involving the lower pole of the left kidney   suggesting early acute pyelonephritis.    2. Moderate parastomal hernia containing the cecum without evidence of   obstruction.    3. Gallbladder is of upper limits of normal. Trace pericholecystic fluid.   Findings likely reactive to the right renal pathology. Right upper   quadrant ultrasound recommended for further evaluation, as clinically   warranted.    4. Hepatic steatosis.    5. Tiny/smallhiatal hernia, not seen on prior study.    6. Colonic diverticulosis.       Xray Chest 1 View-PORTABLE IMMEDIATE (04.01.24 @ 18:44) >  IMPRESSION:    No radiographic evidence of active chest disease..    RECENT CULTURES:    Culture - Urine (04.01.24 @ 18:52)    Specimen Source: Clean Catch None   Culture Results:   >=3 organisms. Probable collection contamination.    Culture - Blood (04.01.24 @ 18:52)    -  K. pneumoniae group: Detec (K. pneumoniae, K. quasipneumoniae, K. variicola)   Gram Stain:   Growth in aerobic bottle: Gram Negative Rods   Specimen Source: .Blood None   Organism: Blood Culture PCR   Culture Results:   Growth in aerobic bottle: Gram Negative Rods  Direct identification is available within approximately 3-5  hours either by Blood Panel Multiplexed PCR or Direct  MALDI-TOF. Details: [email or web address]   Organism Identification: Blood Culture PCR   Method Type: PCR          Cardiac testing : reviewed   EKG   < from: 12 Lead ECG (04.02.24 @ 07:35) >    Ventricular Rate 65 BPM    Atrial Rate 65 BPM    P-R Interval 162 ms    QRS Duration 86 ms    Q-T Interval 390 ms    QTC Calculation(Bazett) 405 ms    P Axis 47 degrees    R Axis -2 degrees    T Axis 7 degrees    Diagnosis Line Normal sinus rhythm  Normal ECG  When compared with ECG of 16-SEP-2022 19:13,  No significant change was found    < end of copied text >    Procedures :     Devices:     Current medications:  acetaminophen     Tablet .. 650 milliGRAM(s) Oral every 6 hours PRN  aluminum hydroxide/magnesium hydroxide/simethicone Suspension 30 milliLiter(s) Oral every 4 hours PRN  amLODIPine   Tablet 2.5 milliGRAM(s) Oral daily  ascorbic acid 500 milliGRAM(s) Oral daily  aspirin  chewable 81 milliGRAM(s) Oral daily  cholecalciferol 400 Unit(s) Oral daily  dextrose 5%. 1000 milliLiter(s) IV Continuous <Continuous>  dextrose 5%. 1000 milliLiter(s) IV Continuous <Continuous>  dextrose 50% Injectable 12.5 Gram(s) IV Push once  dextrose 50% Injectable 25 Gram(s) IV Push once  dextrose 50% Injectable 25 Gram(s) IV Push once  dextrose Oral Gel 15 Gram(s) Oral once PRN  enoxaparin Injectable 40 milliGRAM(s) SubCutaneous every 24 hours  glucagon  Injectable 1 milliGRAM(s) IntraMuscular once  influenza  Vaccine (HIGH DOSE) 0.7 milliLiter(s) IntraMuscular once  insulin glargine Injectable (LANTUS) 8 Unit(s) SubCutaneous at bedtime  insulin lispro (ADMELOG) corrective regimen sliding scale   SubCutaneous three times a day before meals  insulin lispro (ADMELOG) corrective regimen sliding scale   SubCutaneous at bedtime  losartan 50 milliGRAM(s) Oral daily  magnesium oxide 400 milliGRAM(s) Oral daily  melatonin 3 milliGRAM(s) Oral at bedtime PRN  morphine  - Injectable 2 milliGRAM(s) IV Push every 4 hours PRN  morphine  - Injectable 4 milliGRAM(s) IV Push every 4 hours PRN  multivitamin/minerals 1 Tablet(s) Oral daily  naloxone Injectable 0.4 milliGRAM(s) IV Push once PRN  nebivolol 10 milliGRAM(s) Oral daily  ondansetron Injectable 4 milliGRAM(s) IV Push every 8 hours PRN  piperacillin/tazobactam IVPB.. 3.375 Gram(s) IV Intermittent every 8 hours  senna 2 Tablet(s) Oral at bedtime  sodium chloride 0.9%. 1000 milliLiter(s) IV Continuous <Continuous>            
Subjective:  Chief complain :  fevers    HPI:  73 year old female w hx bladder cancer s/p resection w ileal conduit w stent, on Keytruda, DM, HLD, HTN presents to ED BIBEMS from [facility] c/o fevers. Yesterday + right flank pain intermittent then progressed to more severe, woke up w fever T 101.9  , +fevers today, took tylenol 1000mg at 13:00 today. Went to [facility] for blood work and was told WBC 18K  last dose Keytruda was 2 weeks ago. AT [facility] : blood cx x 2   , RVP/COVID, s/p NS 1000 cc,  zosyn    In ED /52   HR 80   RR 18   T 98   98% RA, CT + moderate hydronephrosis, ureteral stent, + acute pyelo and R ureteritis, s/p ceftriaxone 1 g  Admitted to hospital service w sepsis from acute pyelonephritis in pt w bladder CA s/p radical cystectomy w ileal conduit and R ureteral stent through to stoma w mod R hydronephrosis    4/2 -   Patient seen and examined at bedside earlier today, denies cp, dyspnea, parastomal discomfort, denies palpitations  4/3 -  febrile, denies cp, dyspnea, abdominal pain, tolerating IV abx, po diet, + bm, plan discussed  4/4 - afebrile, multiple lose bm, was taking senna and magnesium, denies  abdominal pain, cp, dyspnea, tolerating IV abx    Review of system- Rest of the review of system are negative except mentioned in HPI    Vital Signs reviewed in  Last 24 Hrs  T(C): 36.6 (04 Apr 2024 08:25), Max: 36.9 (04 Apr 2024 05:10)  T(F): 97.9 (04 Apr 2024 08:25), Max: 98.5 (04 Apr 2024 05:10)  HR: 68 (04 Apr 2024 09:37) (65 - 87)  BP: 141/54 (04 Apr 2024 09:37) (124/63 - 164/66)  BP(mean): --  ABP: --  ABP(mean): --  RR: 18 (04 Apr 2024 08:25) (18 - 18)  SpO2: 97% (04 Apr 2024 08:25) (95% - 99%)  O2 Parameters below as of 04 Apr 2024 08:25  Patient On (Oxygen Delivery Method): room air      Physical exam:   General : NAD, appear to be of stated age , well groomed   NERVOUS SYSTEM:  Alert & Oriented X3, non- focal exam, Motor Strength 5/5 B/L upper and lower extremities; DTRs 2+ intact and symmetric  HEAD:  Atraumatic, Normocephalic  EYES: EOMI, PERRLA, conjunctiva and sclera clear  HEENT: Moist mucous membranes, Supple neck , No JVD  CHEST: Clear to auscultation bilaterally; No rales, no rhonchi, no wheezing  HEART: Regular rate and rhythm; No murmurs, no rubs or gallops  ABDOMEN: Soft, Non-tender, Non-distended; Bowel sounds present, no guarding , no peritoneal irritation , + ileal conduit with yellow urine  GENITOURINARY-  no suprapubic tenderness  EXTREMITIES:  2+ Peripheral Pulses, No clubbing, cyanosis,   edema  MUSCULOSKELETAL:- No muscle tenderness, Muscle tone normal, No joint tenderness, no Joint swelling,  Joint ROM –normal   SKIN-no rash, no lesion    Labs radiologic and other test : all reviewed and interpreted :                           8.7    8.60  )-----------( 240      ( 04 Apr 2024 05:53 )             27.4     04-04    142  |  115<H>  |  21  ----------------------------<  127<H>  3.6   |  21<L>  |  1.38<H>    Ca    8.7      04 Apr 2024 05:53  Phos  2.8     04-04  Mg     1.9     04-04    TPro  6.7  /  Alb  2.8<L>  /  TBili  0.4  /  DBili  x   /  AST  17  /  ALT  26  /  AlkPhos  73  04-04        LIVER FUNCTIONS - ( 04 Apr 2024 05:53 )  Alb: 2.8 g/dL / Pro: 6.7 gm/dL / ALK PHOS: 73 U/L / ALT: 26 U/L / AST: 17 U/L / GGT: x               Culture - Urine (04.01.24 @ 18:52)    Specimen Source: Clean Catch None   Culture Results:   >=3 organisms. Probable collection contamination.    Culture - Blood (04.01.24 @ 18:52)    -  K. pneumoniae group: Detec (K. pneumoniae, K. quasipneumoniae, K. variicola)   Gram Stain:   Growth in aerobic bottle: Gram Negative Rods   Specimen Source: .Blood None   Organism: Blood Culture PCR   Culture Results:   Growth in aerobic bottle: Klebsiella pneumoniae  Direct identification is available within approximately 3-5  hours either by Blood Panel Multiplexed PCR or Direct  MALDI-TOF. Details: [email or web address]   Organism Identification: Blood Culture PCR   Method Type: PCR                  CT Abdomen and Pelvis w/ IV Cont (04.01.24 @ 21:13) >  IMPRESSION:    1. Status post cystectomy with pelvic/retroperitoneal lymphadenectomy and   right-sided ileal conduit urinary diversion. Right-sided ureteral stent   is seen with proximal end curled in the right renal pelvis and distal and   exiting through the ileal conduit. Moderate right-sided hydronephrosis.   Areas of decreased attenuation are seen throughout the right kidney   suggesting acute pyelonephritis. Right-sided ureteritis is also   suggested. Asymmetric right-sided perinephric fat stranding/fluid.   Mild/moderate left-sided hydroureteronephrosis with enhancement of the   urothelial lining suggesting ureteritis. Subtle area of decreased   attenuation is also seen involving the lower pole of the left kidney   suggesting early acute pyelonephritis.    2. Moderate parastomal hernia containing the cecum without evidence of   obstruction.    3. Gallbladder is of upper limits of normal. Trace pericholecystic fluid.   Findings likely reactive to the right renal pathology. Right upper   quadrant ultrasound recommended for further evaluation, as clinically   warranted.    4. Hepatic steatosis.    5. Tiny/smallhiatal hernia, not seen on prior study.    6. Colonic diverticulosis.       Xray Chest 1 View-PORTABLE IMMEDIATE (04.01.24 @ 18:44) >  IMPRESSION:    No radiographic evidence of active chest disease..    RECENT CULTURES:    Culture - Urine (04.01.24 @ 18:52)    Specimen Source: Clean Catch None   Culture Results:   >=3 organisms. Probable collection contamination.    Culture - Blood (04.01.24 @ 18:52)    -  K. pneumoniae group: Detec (K. pneumoniae, K. quasipneumoniae, K. variicola)   Gram Stain:   Growth in aerobic bottle: Gram Negative Rods   Specimen Source: .Blood None   Organism: Blood Culture PCR   Culture Results:   Growth in aerobic bottle: Gram Negative Rods  Direct identification is available within approximately 3-5  hours either by Blood Panel Multiplexed PCR or Direct  MALDI-TOF. Details: [email or web address]   Organism Identification: Blood Culture PCR   Method Type: PCR          Cardiac testing : reviewed   EKG   < from: 12 Lead ECG (04.02.24 @ 07:35) >    Ventricular Rate 65 BPM    Atrial Rate 65 BPM    P-R Interval 162 ms    QRS Duration 86 ms    Q-T Interval 390 ms    QTC Calculation(Bazett) 405 ms    P Axis 47 degrees    R Axis -2 degrees    T Axis 7 degrees    Diagnosis Line Normal sinus rhythm  Normal ECG  When compared with ECG of 16-SEP-2022 19:13,  No significant change was found    < end of copied text >    Procedures :     Devices:     Current medications:  acetaminophen     Tablet .. 650 milliGRAM(s) Oral every 6 hours PRN  aluminum hydroxide/magnesium hydroxide/simethicone Suspension 30 milliLiter(s) Oral every 4 hours PRN  amLODIPine   Tablet 2.5 milliGRAM(s) Oral daily  ascorbic acid 500 milliGRAM(s) Oral daily  aspirin  chewable 81 milliGRAM(s) Oral daily  cholecalciferol 400 Unit(s) Oral daily  dextrose 5%. 1000 milliLiter(s) IV Continuous <Continuous>  dextrose 5%. 1000 milliLiter(s) IV Continuous <Continuous>  dextrose 50% Injectable 12.5 Gram(s) IV Push once  dextrose 50% Injectable 25 Gram(s) IV Push once  dextrose 50% Injectable 25 Gram(s) IV Push once  dextrose Oral Gel 15 Gram(s) Oral once PRN  enoxaparin Injectable 40 milliGRAM(s) SubCutaneous every 24 hours  glucagon  Injectable 1 milliGRAM(s) IntraMuscular once  influenza  Vaccine (HIGH DOSE) 0.7 milliLiter(s) IntraMuscular once  insulin glargine Injectable (LANTUS) 8 Unit(s) SubCutaneous at bedtime  insulin lispro (ADMELOG) corrective regimen sliding scale   SubCutaneous three times a day before meals  insulin lispro (ADMELOG) corrective regimen sliding scale   SubCutaneous at bedtime  losartan 50 milliGRAM(s) Oral daily  magnesium oxide 400 milliGRAM(s) Oral daily  melatonin 3 milliGRAM(s) Oral at bedtime PRN  morphine  - Injectable 2 milliGRAM(s) IV Push every 4 hours PRN  morphine  - Injectable 4 milliGRAM(s) IV Push every 4 hours PRN  multivitamin/minerals 1 Tablet(s) Oral daily  naloxone Injectable 0.4 milliGRAM(s) IV Push once PRN  nebivolol 10 milliGRAM(s) Oral daily  ondansetron Injectable 4 milliGRAM(s) IV Push every 8 hours PRN  piperacillin/tazobactam IVPB.. 3.375 Gram(s) IV Intermittent every 8 hours  senna 2 Tablet(s) Oral at bedtime  sodium chloride 0.9%. 1000 milliLiter(s) IV Continuous <Continuous>

## 2024-04-07 NOTE — DISCHARGE NOTE NURSING/CASE MANAGEMENT/SOCIAL WORK - PATIENT PORTAL LINK FT
You can access the FollowMyHealth Patient Portal offered by [facility] by registering at the following website: [email or web address]. By joining [facility]’s FollowMyHealth portal, you will also be able to view your health information using other applications (apps) compatible with our system.

## 2024-04-07 NOTE — DISCHARGE NOTE PROVIDER - NSDCCPCAREPLAN_GEN_ALL_CORE_FT
PRINCIPAL DISCHARGE DIAGNOSIS  Diagnosis: Bacteremia due to Klebsiella pneumoniae  Assessment and Plan of Treatment: complete antibiotics as prescribed, take with probiotics 2 hours after antibiotics , return to Ed if fever, chills, abdominal pain, cloudy urine or any other concerns      SECONDARY DISCHARGE DIAGNOSES  Diagnosis: HTN (hypertension)  Assessment and Plan of Treatment: lower dose of irbersartan 150 mg daily    Diagnosis: Acute renal failure  Assessment and Plan of Treatment: drink plenty of fluids, repeat renal function in 1 week       Diagnosis: DM2 (diabetes mellitus, type 2)  Assessment and Plan of Treatment: A1C with Estimated Average Glucose Result: 7.3: Method: Immunoassay       Reference Range                4.0-5.6%       High risk (prediabetic)        5.7-6.4%       Diabetic, diagnostic             >=6.5%       ADA diabetic treatment goal       <7.0%  The Hemoglobin A1c testing is NGSP-certified.Reference ranges are based  upon the 2010 recommendations of  the American Diabetes Association.  Interpretation may vary for children  and adolescents. % (04.02.24 @ 06:24)  follow up with PCP within 1 week for further preventive care  restart home medications for DM2    Diagnosis: Vitamin D deficiency  Assessment and Plan of Treatment: your vitamin D low, take 1000 units daily  Vitamin D, 25-Hydroxy: 26.8: Deficiency: Less than 20 ng/mL  Insufficiency: 20-29 ng/mL  Optimum Level: 30-80 ng/mL  Possible Toxicity: Greater than 150 ng/mL  The optimal level for 25-hydroxyvitamin D is based on the 2011 Endocrine  Society Clinical Practice Guideline (The Journal of Clinical  Endocrinology and Metabolism, Volume 96, Issue 7, Pages 7880-2620,  https://doi.org/10.1210/ana.0121-3587 )  Note: This assay quantifies the sum of both 25-hydroxyvitamin D2 and  25-hydroxyvitamin D3. Result variation may be observed with other methods  due to lack of standardization. ng/mL (04.03.24 @ 06:28)

## 2024-04-07 NOTE — DISCHARGE NOTE PROVIDER - PROVIDER TOKENS
PROVIDER:[TOKEN:[[id number]],FOLLOWUP:[1 week]],PROVIDER:[TOKEN:[[id number]],FOLLOWUP:[1 week]]

## 2024-04-07 NOTE — DISCHARGE NOTE PROVIDER - CARE PROVIDER_API CALL
Caitlyn Rendon  Internal Medicine  [facility] [place] [address]  Phone: [phone]  Fax: [phone]  Follow Up Time: 1 week    Orestes Wood  Urology  [address], [address]  [place] [address]  Phone: [phone]  Fax: [phone]  Follow Up Time: 1 week

## 2024-04-07 NOTE — DISCHARGE NOTE PROVIDER - NSDCCPTREATMENT_GEN_ALL_CORE_FT
PRINCIPAL PROCEDURE  Procedure: Abdomen CT  Findings and Treatment: IMPRESSION:  1. Status post cystectomy with pelvic/retroperitoneal lymphadenectomy and   right-sided ileal conduit urinary diversion. Right-sided ureteral stent   is seen with proximal end curled in the right renal pelvis and distal and   exiting through the ileal conduit. Moderate right-sided hydronephrosis.   Areas of decreased attenuation are seen throughout the right kidney   suggesting acute pyelonephritis. Right-sided ureteritis is also   suggested. Asymmetric right-sided perinephric fat stranding/fluid.   Mild/moderate left-sided hydroureteronephrosis with enhancement of the   urothelial lining suggesting ureteritis. Subtle area of decreased   attenuation is also seen involving the lower pole of the left kidney   suggesting early acute pyelonephritis.  2. Moderate parastomal hernia containing the cecum without evidence of   obstruction.  3. Gallbladder is of upper limits of normal. Trace pericholecystic fluid.   Findings likely reactive to the right renal pathology. Right upper   quadrant ultrasound recommended for further evaluation, as clinically   warranted.  4. Hepatic steatosis.  5. Tiny/smallhiatal hernia, not seen on prior study.  6. Colonic diverticulosis.        SECONDARY PROCEDURE  Procedure: Blood culture  Findings and Treatment: Culture - Blood (04.01.24 @ 18:52)    Gram Stain:   Growth in aerobic bottle: Gram Negative Rods    -  Ampicillin: R >16 These ampicillin results predict results for amoxicillin    -  Ampicillin/Sulbactam: S 8/4    -  Aztreonam: S <=4    -  Cefazolin: S <=2    -  Cefepime: S <=2    -  Cefoxitin: S <=8    -  Ceftriaxone: S <=1    -  Ciprofloxacin: S <=0.25    -  Ertapenem: S <=0.5    -  Gentamicin: S <=2    -  Imipenem: S <=1    -  Levofloxacin: S <=0.5    -  Meropenem: S <=1    -  Piperacillin/Tazobactam: S <=8    -  Tobramycin: S <=2    -  Trimethoprim/Sulfamethoxazole: S <=0.5/9.5    -  K. pneumoniae group: Detec (K. pneumoniae, K. quasipneumoniae, K. variicola)    Specimen Source: .Blood None    Organism: Blood Culture PCR    Organism: Klebsiella pneumoniae    Culture Results:   Growth in aerobic bottle: Klebsiella pneumoniae  Direct identification is available within approximately 3-5  hours either by Blood Panel Multiplexed PCR or Direct  MALDI-TOF. Details: [email or web address]    Organism Identification: Blood Culture PCR  Klebsiella pneumoniae    Method Type: PCR    Method Type: LISA

## 2024-04-07 NOTE — DISCHARGE NOTE NURSING/CASE MANAGEMENT/SOCIAL WORK - NSDCPEFALRISK_GEN_ALL_CORE
For information on Fall & Injury Prevention, visit: [email or web address] OR  [email or web address] OR  https://www.cdc.gov/steadi/patient.html <<----- Click to add NO pertinent Family History

## 2024-04-07 NOTE — DISCHARGE NOTE PROVIDER - HOSPITAL COURSE
Chief complain :  fevers  HPI:  73 year old female w hx bladder cancer s/p resection w ileal conduit w stent, on Keytruda, DM, HLD, HTN presents to ED BIBEMS from [facility] c/o fevers. Yesterday + right flank pain intermittent then progressed to more severe, woke up w fever T 101.9  , +fevers today, took tylenol 1000mg at 13:00 today. Went to [facility] for blood work and was told WBC 18K  last dose Keytruda was 2 weeks ago. AT [facility] : blood cx x 2   , RVP/COVID, s/p NS 1000 cc,  zosyn    In ED /52   HR 80   RR 18   T 98   98% RA, CT + moderate hydronephrosis, ureteral stent, + acute pyelo and R ureteritis, s/p ceftriaxone 1 g  Admitted to hospital service w sepsis from acute pyelonephritis in pt w bladder CA s/p radical cystectomy w ileal conduit and R ureteral stent through to stoma w mod R hydronephrosis  4/2 -   Patient seen and examined at bedside earlier today, denies cp, dyspnea, parastomal discomfort, denies palpitations  4/3 -  febrile, denies cp, dyspnea, abdominal pain, tolerating IV abx, po diet, + bm, plan discussed  4/4 - afebrile, multiple lose bm, was taking senna and magnesium, denies  abdominal pain, cp, dyspnea, tolerating IV abx  4/5 - + ankle mild edema, + lose stools, denies abdominal pain, tolerating po intake, plan discussed   4/6-  multiple watery stools in am, denies abdominal pain, denies cp, dyspnea afebrile  4/7 - watery stools better, denies cp, dyspnea, abdominal pain, afebrile  Review of system- Rest of the review of system  are negative except mentioned in HPI  Vital Signs reviewed in  Last 24 Hrs  T(C): 36.8 (07 Apr 2024 08:29), Max: 37 (06 Apr 2024 15:19)  T(F): 98.3 (07 Apr 2024 08:29), Max: 98.6 (06 Apr 2024 15:19)  HR: 64 (07 Apr 2024 08:29) (61 - 64)  BP: 151/57 (07 Apr 2024 08:29) (138/54 - 151/57)    RR: 18 (07 Apr 2024 08:29) (18 - 18)  SpO2: 98% (07 Apr 2024 08:29) (98% - 98%)  O2 Parameters below as of 07 Apr 2024 08:29  Patient On (Oxygen Delivery Method): room air  Physical exam:   General : NAD, appear to be of stated age , well groomed   NERVOUS SYSTEM:  Alert & Oriented X3, non- focal exam, Motor Strength 5/5 B/L upper and lower extremities; DTRs 2+ intact and symmetric  HEAD:  Atraumatic, Normocephalic  EYES: EOMI, PERRLA, conjunctiva and sclera clear  HEENT: Moist mucous membranes, Supple neck , No JVD  CHEST: Clear to auscultation bilaterally; No rales, no rhonchi, no wheezing  HEART: Regular rate and rhythm; No murmurs, no rubs or gallops  ABDOMEN: Soft, Non-tender, Non-distended; Bowel sounds present, no guarding , no peritoneal irritation , + ileal conduit with yellow urine  GENITOURINARY-  no suprapubic tenderness  EXTREMITIES:  2+ Peripheral Pulses, No clubbing, cyanosis,   edema  MUSCULOSKELETAL:- No muscle tenderness, Muscle tone normal, No joint tenderness, no Joint swelling,  Joint ROM –normal   SKIN-no rash, no lesion    Labs radiologic and other test : all reviewed and interpreted :     · Assessment    73 year old female w hx bladder cancer s/p resection w ileal conduit, on Keytruda, DM, HLD, HTN presents to ED BIBEMS from [facility] c/o fevers  #Sepsis, POA due to Pyelonephritis with underlying bladder cancer s/p cystectomy and ileal conduit creation s/p R urethral stents   #K. pneumoniae group: Detec (K. pneumoniae, K. quasipneumoniae, K. variicola) bacteremia  # Moderate bilateral hydronephrosis ,  Moderate parastomal hernia  - T 101.9 w leukocytosis 18K, abn UA  - on immunotherapy  w last dose 2 weeks ago  -  s/p NS 1000 cc at [facility]  -  s/p  ceftriaxone 1 g in ED, c/w  zosyn --> ceftriaxone --> ceftin 14 days in total  Culture - Blood (04.05.24 @ 12:56)    Specimen Source: .Blood None   Culture Results:  No growth at 24 hours  - follow up cx + K. pneumoniae group: Detec (K. pneumoniae, K. quasipneumoniae, K. variicola)  here and at [facility] - pending   -  Onc consult  - ID consult   - Urology consult   - s/p  IV fluids   cc/hr her  - repeat blood cultures 4/5/24 -  neg   # Diarrhea due to antibiotics  - add probiotics,  stop laxatives   - stool studies for C diff GI PCR - neg   # BELIA with baseline Cr 1.2, resolved   - s/p  IV fluids,  stop losartan , lower dose of ARbs upon discharge   - Creatinine Trend: 1.1<-- 1.3 <--1.52<--, 1.42<--, 1.48<--  #HLD - c/w  statin  #HTN -  irbesartan interch to losartan 100 mg qd--> 50 mg--> stop  due to low bordeline BP   - nebivolol 10 mg qd,   amlodipine 2.5 mg qd,  4/5 restart low dose losartan 25 --> 50 --> irbersartan 150 upon discharge  # Chronic constipation now diarrhea  - stop senna hs,   add probiotics tid  # DM2 with A1C 7.3 - diet, ISS  # Vitamin D deficiency  - vit D 400--> 1000 hs  Final diagnosis, treatment plan, and follow-up recommendations were discussed and explained to the patient.   The patient was given an opportunity to ask questions concerning the diagnosis and treatment plan.   The patient acknowledged understanding of the diagnosis, treatment, and follow-up recommendations.   The patient was advised to seek urgent care upon discharge if worsening symptoms develop prior to scheduled follow-up.   Time spent on discharge included time with the patient, and also coordinating discharge care as outlined below.  Discharge note faxed to PCP with my contact information to call me back   PCP Dr. Rendon  Total time spent: 50 min

## 2024-04-07 NOTE — PROGRESS NOTE ADULT - ASSESSMENT
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 Hadley    # UTI  - pt still with fever this am 101.4   - AT [facility] [place] : given zosyn   - CT+ moderate hydronephrosis, ureteral stent, + acute pyelo and R ureteritis  - c/w zosyn - seen by ID   - WBC trending down Today, WBC 9.8, Hb 8, plt 188,- BCx negative   - UCx with possible contamination- repeat     will continue to follow 
73 year old female w hx bladder cancer s/p resection w ileal conduit, on Keytruda, DM, HLD, HTN presents to ED BIBEMS from [facility] [place] c/o fevers      #Sepsis, POA due to Pyelonephritis with underlying bladder cancer s/p cystectomy and ileal conduit creation s/p R urethral stents   #K. pneumoniae group: Detec (K. pneumoniae, K. quasipneumoniae, K. variicola) bacteremia  # Moderate bilateral hydronephrosis   # Moderate parastomal hernia  - T 101.9 w leukocytosis 18K, abn UA  - on immunotherapy  w last dose 2 weeks ago  -  s/p NS 1000 cc at [facility] [place]  -  s/p  ceftriaxone 1 g in ED, c/w  zosyn --> ceftriaxone   - follow up cx + K. pneumoniae group: Detec (K. pneumoniae, K. quasipneumoniae, K. variicola)  here and at [facility] [place] - pending   -  Onc consult  - ID consult   - Urology consult   - s/p  IV fluids   cc/hr her  - repeat blood cultures 4/5/24 - pending    # BELIA with baseline Cr 1.2, resolved   - s/p  IV fluids  - stop losartan   - Creatinine Trend: 1.1<-- 1.3 <--1.52<--, 1.42<--, 1.48<--    #HLD  - c/w  statin    #HTN  -  irbesartan interch to losartan 100 mg qd--> 50 mg--> stop  due to low bordeline BP   - nebivolol 10 mg qd  -  amlodipine 2.5 mg qd  - 4/5 restart low dose losartan 25     # Chronic constipation now diarrhea   - stop senna hs, stop Mg   - add probiotics tid    # DM2 with A1C 7.3  - diet, ISS    # Vitamin D deficiency   - vit D 400--> 1000 hs      #VTE  lovenox 40 mg q 24          Dispo- IV fluids, IV abx, ID consult 
73 year old female w hx bladder cancer s/p resection w ileal conduit, on Keytruda, DM, HLD, HTN [place] [facility] [place]      #Sepsis, POA due to Pyelonephritis with underlying bladder cancer s/p cystectomy and ileal conduit creation s/p R urethral stents   #K. pneumoniae group: Detec (K. pneumoniae, K. quasipneumoniae, K. variicola) bacteremia  # Moderate bilateral hydronephrosis   # Moderate parastomal hernia  - T 101.9 w leukocytosis 18K, abn UA  - on immunotherapy  w last dose 2 weeks ago  -  s/p NS 1000 cc [place] [facility] [place]  -  s/p  ceftriaxone 1 g in ED, c/w  zosyn   - follow up cx + K. pneumoniae group: Detec (K. pneumoniae, K. quasipneumoniae, K. variicola)  [place] and at [facility] [place] - pending   -  Onc consult  - ID consult   - Urology consult   - c/w IV fluids   cc/hr her  - pain management   -  morphine 2 mg prn mod pain, morphine 4 mg q 4 prn severe, naloxone 0.4 mg IV prn    # BELIA with baseline Cr 1.2  - c/w IV fluids  - stop losartan   - Creatinine Trend: 1.3 <--1.52<--, 1.42<--, 1.48<--    #HLD  - c/w  statin    #HTN  -  irbesartan interch to losartan 100 mg qd--> 50 mg--> stop  due to low bordeline BP   - nebivolol 10 mg qd  -  amlodipine 2.5 mg qd    # Chronic constipation now diarrhea   - stop senna hs, stop Mg   - add probiotics tid    # DM2 with A1C 7.3  - diet, ISS      #VTE  lovenox 40 mg q 24          Dispo- IV fluids, IV abx, ID consult 
73 year old female w hx bladder cancer s/p resection w ileal conduit w stent, on Keytruda LD 3/21/24, DM, HLD, HTN presents to ED BIBEMS from [facility] c/o fevers 2/2 UTI     # urothelial carcinoma - continues on Keytruda - diarrhea low suspicion of i/o associated diarrhea     # UTI + acute pyelo and R ureteritis  - c/w zosyn   - now planned for discharge home today on PO CEFTIN     #Diarrhea  - C diff collection NEG  - continue with encouraging po hydration     will continue to follow       
73 year old female w hx bladder cancer s/p resection w ileal conduit, on Keytruda, DM, HLD, HTN presents to ED BIBEMS from [facility] [place] c/o fevers      #Sepsis, POA due to Pyelonephritis with underlying bladder cancer s/p cystectomy and ileal conduit creation s/p R urethral stents   #K. pneumoniae group: Detec (K. pneumoniae, K. quasipneumoniae, K. variicola) bacteremia  # Moderate bilateral hydronephrosis   # Moderate parastomal hernia  - T 101.9 w leukocytosis 18K, abn UA  - on immunotherapy  w last dose 2 weeks ago  -  s/p NS 1000 cc at [facility] [place]  -  s/p  ceftriaxone 1 g in ED, c/w  zosyn --> ceftriaxone   - follow up cx + K. pneumoniae group: Detec (K. pneumoniae, K. quasipneumoniae, K. variicola)  here and at [facility] [place] - pending   -  Onc consult  - ID consult   - Urology consult   - s/p  IV fluids   cc/hr her  - repeat blood cultures 4/5/24 - pending    # Diarrhea   - add probiotics  - stop laxatives   - stool studies for C diff GI PCR    # BELIA with baseline Cr 1.2, resolved   - s/p  IV fluids  - stop losartan   - Creatinine Trend: 1.1<-- 1.3 <--1.52<--, 1.42<--, 1.48<--    #HLD  - c/w  statin    #HTN  -  irbesartan interch to losartan 100 mg qd--> 50 mg--> stop  due to low bordeline BP   - nebivolol 10 mg qd  -  amlodipine 2.5 mg qd  - 4/5 restart low dose losartan 25     # Chronic constipation now diarrhea   - stop senna hs, stop Mg   - add probiotics tid    # DM2 with A1C 7.3  - diet, ISS    # Vitamin D deficiency   - vit D 400--> 1000 hs      #VTE  lovenox 40 mg q 24          Dispo-  IV abx, stool studies, d/c planning leena
73 year old female w hx bladder cancer s/p resection w ileal conduit, on Keytruda, DM, HLD, HTN presents to ED BIBEMS from [facility] [place] c/o fevers      #Sepsis, POA due to Pyelonephritis with underlying bladder cancer s/p cystectomy and ileal conduit creation s/p R urethral stents   # Moderate bilateral hydronephrosis   # Moderate parastomal hernia  - T 101.9 w leukocytosis 18K, abn UA  - on immunotherapy  w last dose 2 weeks ago  -  s/p NS 1000 cc at [facility] [place]  -  s/p  ceftriaxone 1 g in ED, c/w  zosyn   - follow up cx here and at [facility] [place]  -  Onc consult  - ID consult   - Urology consult   - c/w IV fluids   cc/hr her  - pain management   -  morphine 2 mg prn mod pain, morphine 4 mg q 4 prn severe, naloxone 0.4 mg IV prn    #HLD  - c/w  statin    #HTN  -  irbesartan interch to losartan 100 mg qd--> 50 mg due to low bordeline BP   - nebivolol 10 mg qd  -  amlodipine 2.5 mg qd    # Chronic constipation   - senna hs    # DM2 with A1C 7.3  - diet, ISS      #VTE  lovenox 40 mg q 24          Dispo- IV fluids, IV abx
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 Hadley    # UTI  - pt still with prior  fever now afebrile  - [facility] [place] [facility] given [facility]osyn   -BCx with pan sensitive Klebsiella   - CT+ moderate hydronephrosis, ureteral stent, + acute pyelo and R ureteritis  - c/w zosyn - seen by ID     #Diarrhea  -holding senna  -starting probiotic    will continue to follow     d/w daughter via phone  
73 year old female w hx bladder cancer s/p resection w ileal conduit w stent, on Keytruda LD 3/21/24, DM, HLD, HTN presents to ED BIBEMS from [facility] c/o fevers 2/2 UTI     # urothelial carcinoma - continues on Keytruda - diarrhea low suspicion of i/o associated diarrhea     # UTI + acute pyelo and R ureteritis  - c/w zosyn     #Diarrhea  - C diff collection pending   - continue with encouraging po hydration     will continue to follow     d/w daughter via phone  
73 year old female w hx bladder cancer s/p resection w ileal conduit, on Keytruda, DM, HLD, HTN presents to ED [facility] [place] [facility] c/o fevers      #Sepsis, POA due to Pyelonephritis with underlying bladder cancer s/p cystectomy and ileal conduit creation s/p R urethral stents   #K. pneumoniae group: Detec (K. pneumoniae, K. quasipneumoniae, K. variicola) bacteremia  # Moderate bilateral hydronephrosis   # Moderate parastomal hernia  - T 101.9 w leukocytosis 18K, abn UA  - on immunotherapy  w last dose 2 weeks ago  -  s/p NS 1000 cc [facility] [place] [facility]  -  s/p  ceftriaxone 1 g in ED, c/w  zosyn   - follow up cx + K. pneumoniae group: Detec (K. pneumoniae, K. quasipneumoniae, K. variicola)  here and at [facility] [place] [facility] - pending   -  Onc consult  - ID consult   - Urology consult   - c/w IV fluids   cc/hr her  - pain management   -  morphine 2 mg prn mod pain, morphine 4 mg q 4 prn severe, naloxone 0.4 mg IV prn    # BELIA with baseline Cr 1.2  - c/w IV fluids  - stop losartan   - Creatinine Trend: 1.52<--, 1.42<--, 1.48<--    #HLD  - c/w  statin    #HTN  -  irbesartan interch to losartan 100 mg qd--> 50 mg--> stop  due to low bordeline BP   - nebivolol 10 mg qd  -  amlodipine 2.5 mg qd    # Chronic constipation   - senna hs    # DM2 with A1C 7.3  - diet, ISS      #VTE  lovenox 40 mg q 24          Dispo- IV fluids, IV abx, ID consult

## 2024-04-17 DIAGNOSIS — N28.89 OTHER SPECIFIED DISORDERS OF KIDNEY AND URETER: ICD-10-CM

## 2024-04-17 DIAGNOSIS — A41.59 OTHER GRAM-NEGATIVE SEPSIS: ICD-10-CM

## 2024-04-17 DIAGNOSIS — E55.9 VITAMIN D DEFICIENCY, UNSPECIFIED: ICD-10-CM

## 2024-04-17 DIAGNOSIS — Z88.1 ALLERGY STATUS TO OTHER ANTIBIOTIC AGENTS: ICD-10-CM

## 2024-04-17 DIAGNOSIS — N17.9 ACUTE KIDNEY FAILURE, UNSPECIFIED: ICD-10-CM

## 2024-04-17 DIAGNOSIS — E11.9 TYPE 2 DIABETES MELLITUS WITHOUT COMPLICATIONS: ICD-10-CM

## 2024-04-17 DIAGNOSIS — Z79.82 LONG TERM (CURRENT) USE OF ASPIRIN: ICD-10-CM

## 2024-04-17 DIAGNOSIS — E78.5 HYPERLIPIDEMIA, UNSPECIFIED: ICD-10-CM

## 2024-04-17 DIAGNOSIS — N12 TUBULO-INTERSTITIAL NEPHRITIS, NOT SPECIFIED AS ACUTE OR CHRONIC: ICD-10-CM

## 2024-04-17 DIAGNOSIS — Z79.899 OTHER LONG TERM (CURRENT) DRUG THERAPY: ICD-10-CM

## 2024-04-17 DIAGNOSIS — T36.95XA ADVERSE EFFECT OF UNSPECIFIED SYSTEMIC ANTIBIOTIC, INITIAL ENCOUNTER: ICD-10-CM

## 2024-04-17 DIAGNOSIS — C67.9 MALIGNANT NEOPLASM OF BLADDER, UNSPECIFIED: ICD-10-CM

## 2024-04-17 DIAGNOSIS — I10 ESSENTIAL (PRIMARY) HYPERTENSION: ICD-10-CM

## 2024-04-17 DIAGNOSIS — K52.1 TOXIC GASTROENTERITIS AND COLITIS: ICD-10-CM

## 2024-04-17 DIAGNOSIS — M19.90 UNSPECIFIED OSTEOARTHRITIS, UNSPECIFIED SITE: ICD-10-CM

## 2024-04-17 DIAGNOSIS — Z92.21 PERSONAL HISTORY OF ANTINEOPLASTIC CHEMOTHERAPY: ICD-10-CM

## 2024-04-17 DIAGNOSIS — Z79.620 LONG TERM (CURRENT) USE OF IMMUNOSUPPRESSIVE BIOLOGIC: ICD-10-CM

## 2024-04-17 DIAGNOSIS — Z88.8 ALLERGY STATUS TO OTHER DRUGS, MEDICAMENTS AND BIOLOGICAL SUBSTANCES STATUS: ICD-10-CM
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
## 2024-04-17 DIAGNOSIS — K59.09 OTHER CONSTIPATION: ICD-10-CM

## 2024-04-17 DIAGNOSIS — K43.5 PARASTOMAL HERNIA WITHOUT OBSTRUCTION OR GANGRENE: ICD-10-CM

## 2024-04-17 DIAGNOSIS — Z11.52 ENCOUNTER FOR SCREENING FOR COVID-19: ICD-10-CM

## 2024-04-17 DIAGNOSIS — I95.9 HYPOTENSION, UNSPECIFIED: ICD-10-CM

## 2024-04-17 DIAGNOSIS — Z88.2 ALLERGY STATUS TO SULFONAMIDES: ICD-10-CM

## 2024-04-17 DIAGNOSIS — N13.30 UNSPECIFIED HYDRONEPHROSIS: ICD-10-CM

## 2024-04-17 DIAGNOSIS — Z90.6 ACQUIRED ABSENCE OF OTHER PARTS OF URINARY TRACT: ICD-10-CM

## 2024-04-17 NOTE — CDI QUERY NOTE - [id number]_GEN_ALL_CORE_HH
Please document the relationship between the following conditions:    - Sepsis due to pyelonephritis and is a complication of previous cystectomy and ileal conduit creation s/p R urethral stents   - Sepsis due to pyelonephritis and is not a complication of previous cystectomy and ileal conduit creation s/p R urethral stents   - other ( please specify)       SUPPORTING DOCUMENTATION AND/OR CLINICAL EVIDENCE:    Discharge summary on 4/7/2024:  #Sepsis, POA due to Pyelonephritis with underlying bladder cancer s/p cystectomy and ileal conduit creation s/p R urethral stents   #K. pneumoniae group: Detec (K. pneumoniae, K. quasipneumoniae, K. variicola) bacteremia
